# Patient Record
Sex: FEMALE | Race: WHITE | NOT HISPANIC OR LATINO | URBAN - METROPOLITAN AREA
[De-identification: names, ages, dates, MRNs, and addresses within clinical notes are randomized per-mention and may not be internally consistent; named-entity substitution may affect disease eponyms.]

---

## 2018-11-09 ENCOUNTER — EMERGENCY (EMERGENCY)
Facility: HOSPITAL | Age: 68
LOS: 0 days | Discharge: NURSING, REHAB OR LONG-TERM CARE | End: 2018-11-09
Attending: EMERGENCY MEDICINE

## 2018-11-09 VITALS
HEART RATE: 74 BPM | TEMPERATURE: 97 F | RESPIRATION RATE: 18 BRPM | SYSTOLIC BLOOD PRESSURE: 176 MMHG | DIASTOLIC BLOOD PRESSURE: 102 MMHG | OXYGEN SATURATION: 97 %

## 2018-11-09 VITALS
TEMPERATURE: 98 F | SYSTOLIC BLOOD PRESSURE: 169 MMHG | RESPIRATION RATE: 18 BRPM | HEART RATE: 78 BPM | OXYGEN SATURATION: 97 % | DIASTOLIC BLOOD PRESSURE: 87 MMHG

## 2018-11-09 DIAGNOSIS — S05.12XA CONTUSION OF EYEBALL AND ORBITAL TISSUES, LEFT EYE, INITIAL ENCOUNTER: ICD-10-CM

## 2018-11-09 DIAGNOSIS — S05.11XA CONTUSION OF EYEBALL AND ORBITAL TISSUES, RIGHT EYE, INITIAL ENCOUNTER: ICD-10-CM
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
## 2018-11-09 LAB
ANION GAP SERPL CALC-SCNC: 12 MMOL/L — SIGNIFICANT CHANGE UP (ref 7–14)
BASOPHILS # BLD AUTO: 0.03 K/UL — SIGNIFICANT CHANGE UP (ref 0–0.2)
BASOPHILS NFR BLD AUTO: 0.6 % — SIGNIFICANT CHANGE UP (ref 0–1)
BUN SERPL-MCNC: 18 MG/DL — SIGNIFICANT CHANGE UP (ref 10–20)
CALCIUM SERPL-MCNC: 9.5 MG/DL — SIGNIFICANT CHANGE UP (ref 8.5–10.1)
CHLORIDE SERPL-SCNC: 102 MMOL/L — SIGNIFICANT CHANGE UP (ref 98–110)
CO2 SERPL-SCNC: 27 MMOL/L — SIGNIFICANT CHANGE UP (ref 17–32)
CREAT SERPL-MCNC: 0.7 MG/DL — SIGNIFICANT CHANGE UP (ref 0.7–1.5)
EOSINOPHIL # BLD AUTO: 0.22 K/UL — SIGNIFICANT CHANGE UP (ref 0–0.7)
EOSINOPHIL NFR BLD AUTO: 4.2 % — SIGNIFICANT CHANGE UP (ref 0–8)
GLUCOSE SERPL-MCNC: 109 MG/DL — HIGH (ref 70–99)
HCT VFR BLD CALC: 35.3 % — LOW (ref 37–47)
HGB BLD-MCNC: 11.6 G/DL — LOW (ref 12–16)
IMM GRANULOCYTES NFR BLD AUTO: 0.2 % — SIGNIFICANT CHANGE UP (ref 0.1–0.3)
LYMPHOCYTES # BLD AUTO: 1.35 K/UL — SIGNIFICANT CHANGE UP (ref 1.2–3.4)
LYMPHOCYTES # BLD AUTO: 25.9 % — SIGNIFICANT CHANGE UP (ref 20.5–51.1)
MCHC RBC-ENTMCNC: 29.5 PG — SIGNIFICANT CHANGE UP (ref 27–31)
MCHC RBC-ENTMCNC: 32.9 G/DL — SIGNIFICANT CHANGE UP (ref 32–37)
MCV RBC AUTO: 89.8 FL — SIGNIFICANT CHANGE UP (ref 81–99)
MONOCYTES # BLD AUTO: 0.59 K/UL — SIGNIFICANT CHANGE UP (ref 0.1–0.6)
MONOCYTES NFR BLD AUTO: 11.3 % — HIGH (ref 1.7–9.3)
NEUTROPHILS # BLD AUTO: 3.01 K/UL — SIGNIFICANT CHANGE UP (ref 1.4–6.5)
NEUTROPHILS NFR BLD AUTO: 57.8 % — SIGNIFICANT CHANGE UP (ref 42.2–75.2)
NRBC # BLD: 0 /100 WBCS — SIGNIFICANT CHANGE UP (ref 0–0)
PLATELET # BLD AUTO: 188 K/UL — SIGNIFICANT CHANGE UP (ref 130–400)
POTASSIUM SERPL-MCNC: 4 MMOL/L — SIGNIFICANT CHANGE UP (ref 3.5–5)
POTASSIUM SERPL-SCNC: 4 MMOL/L — SIGNIFICANT CHANGE UP (ref 3.5–5)
RBC # BLD: 3.93 M/UL — LOW (ref 4.2–5.4)
RBC # FLD: 12.6 % — SIGNIFICANT CHANGE UP (ref 11.5–14.5)
SODIUM SERPL-SCNC: 141 MMOL/L — SIGNIFICANT CHANGE UP (ref 135–146)
WBC # BLD: 5.21 K/UL — SIGNIFICANT CHANGE UP (ref 4.8–10.8)
WBC # FLD AUTO: 5.21 K/UL — SIGNIFICANT CHANGE UP (ref 4.8–10.8)

## 2018-11-09 NOTE — ED PROVIDER NOTE - PROGRESS NOTE DETAILS
Endorsed to Dr Kirk to follow up CT scan, and dispo. I acknowledge signout from Dr. Francis I attempted to reach the [facility] without any answer and Dr. Francis attempted multiple times previously as well.

## 2018-11-09 NOTE — ED PROVIDER NOTE - CARE PROVIDER_API CALL
Celina Buckner), Internal Medicine  [address] [place] [address]  Phone: [phone]  Fax: [phone]

## 2018-11-09 NOTE — ED ADULT NURSE NOTE - CHIEF COMPLAINT QUOTE
Pt ALEJANDRO from [facility] for b/l bruising of the eyes unknown source. pt takes ASA 81mg.

## 2018-11-09 NOTE — ED PROVIDER NOTE - OBJECTIVE STATEMENT
69 yo female h/o RA, psychosis, cognitive impairment sent from assisted living facility for evaluation of periorbital ecchymosis.  unknown chronicity, patient unable to tell, facility she is from is not picking up the phone.  Patient appears well otherwise, smiling.  Given lack of info will obtain basic labs and Ct head and c-spine.

## 2018-11-09 NOTE — ED ADULT TRIAGE NOTE - CHIEF COMPLAINT QUOTE
Pt BIBA from [facility] living for b/l bruising of the eyes unknown source, Pt is nonverbal pt takes ASA 81mg. Pt ALEJANDRO from [facility] for b/l bruising of the eyes unknown source. pt takes ASA 81mg.

## 2018-11-09 NOTE — ED PROVIDER NOTE - PHYSICAL EXAMINATION
Vital Signs: I have reviewed the initial vital signs.  Constitutional: well-nourished, well-appearing, appears stated age, no acute distress, smiling  Head: no lacerations or palpable hematomas, + b/l periorbital ecchymosis, EOMI without entrapment, PERRL  ENT: mmm, no epistaxis /septal hematoma  Cardiovascular: regular rate, regular rhythm, well-perfused extremities  Respiratory: unlabored respiratory effort, clear to auscultation bilaterally  Gastrointestinal: soft, non-tender abdomen, no pulsatile mass, no CVA ttp  Musculoskeletal: supple neck without midline spine ttp,, no lower extremity edema, no focal ttp over the bones  or joints  Integumentary: warm, dry, no rash, no lacerations   Neurologic: awake, alert, smiling, good eye contact, follow simple directions, but does not answer questions, moving all extremities equally  Psychiatric: cooperative

## 2018-12-10 ENCOUNTER — OUTPATIENT (OUTPATIENT)
Dept: OUTPATIENT SERVICES | Facility: HOSPITAL | Age: 68
LOS: 1 days | Discharge: HOME | End: 2018-12-10

## 2018-12-10 DIAGNOSIS — I11.9 HYPERTENSIVE HEART DISEASE WITHOUT HEART FAILURE: ICD-10-CM

## 2018-12-31 ENCOUNTER — OUTPATIENT (OUTPATIENT)
Dept: OUTPATIENT SERVICES | Facility: HOSPITAL | Age: 68
LOS: 1 days | Discharge: HOME | End: 2018-12-31

## 2018-12-31 DIAGNOSIS — F03.90 UNSPECIFIED DEMENTIA WITHOUT BEHAVIORAL DISTURBANCE: ICD-10-CM

## 2019-01-02 ENCOUNTER — OUTPATIENT (OUTPATIENT)
Dept: OUTPATIENT SERVICES | Facility: HOSPITAL | Age: 69
LOS: 1 days | Discharge: HOME | End: 2019-01-02

## 2019-01-02 DIAGNOSIS — F03.90 UNSPECIFIED DEMENTIA WITHOUT BEHAVIORAL DISTURBANCE: ICD-10-CM

## 2019-02-20 ENCOUNTER — OUTPATIENT (OUTPATIENT)
Dept: OUTPATIENT SERVICES | Facility: HOSPITAL | Age: 69
LOS: 1 days | Discharge: HOME | End: 2019-02-20

## 2019-02-20 DIAGNOSIS — F29 UNSPECIFIED PSYCHOSIS NOT DUE TO A SUBSTANCE OR KNOWN PHYSIOLOGICAL CONDITION: ICD-10-CM

## 2019-04-04 ENCOUNTER — OUTPATIENT (OUTPATIENT)
Dept: OUTPATIENT SERVICES | Facility: HOSPITAL | Age: 69
LOS: 1 days | Discharge: HOME | End: 2019-04-04

## 2019-04-04 DIAGNOSIS — R50.9 FEVER, UNSPECIFIED: ICD-10-CM

## 2019-05-29 ENCOUNTER — OUTPATIENT (OUTPATIENT)
Dept: OUTPATIENT SERVICES | Facility: HOSPITAL | Age: 69
LOS: 1 days | Discharge: HOME | End: 2019-05-29

## 2019-05-29 DIAGNOSIS — G40.89 OTHER SEIZURES: ICD-10-CM

## 2019-05-29 DIAGNOSIS — J96.00 ACUTE RESPIRATORY FAILURE, UNSPECIFIED WHETHER WITH HYPOXIA OR HYPERCAPNIA: ICD-10-CM

## 2019-05-29 DIAGNOSIS — I11.9 HYPERTENSIVE HEART DISEASE WITHOUT HEART FAILURE: ICD-10-CM

## 2019-05-29 DIAGNOSIS — Z99.11 DEPENDENCE ON RESPIRATOR [VENTILATOR] STATUS: ICD-10-CM

## 2019-05-29 DIAGNOSIS — G30.9 ALZHEIMER'S DISEASE, UNSPECIFIED: ICD-10-CM

## 2019-05-31 ENCOUNTER — OUTPATIENT (OUTPATIENT)
Dept: OUTPATIENT SERVICES | Facility: HOSPITAL | Age: 69
LOS: 1 days | Discharge: HOME | End: 2019-05-31

## 2019-05-31 DIAGNOSIS — Z99.11 DEPENDENCE ON RESPIRATOR [VENTILATOR] STATUS: ICD-10-CM

## 2019-05-31 DIAGNOSIS — J96.00 ACUTE RESPIRATORY FAILURE, UNSPECIFIED WHETHER WITH HYPOXIA OR HYPERCAPNIA: ICD-10-CM

## 2019-06-07 ENCOUNTER — OUTPATIENT (OUTPATIENT)
Dept: OUTPATIENT SERVICES | Facility: HOSPITAL | Age: 69
LOS: 1 days | Discharge: HOME | End: 2019-06-07

## 2019-06-07 DIAGNOSIS — G40.89 OTHER SEIZURES: ICD-10-CM

## 2019-07-26 ENCOUNTER — OUTPATIENT (OUTPATIENT)
Dept: OUTPATIENT SERVICES | Facility: HOSPITAL | Age: 69
LOS: 1 days | Discharge: HOME | End: 2019-07-26

## 2019-07-26 DIAGNOSIS — F03.90 UNSPECIFIED DEMENTIA WITHOUT BEHAVIORAL DISTURBANCE: ICD-10-CM

## 2019-07-26 DIAGNOSIS — R33.0 DRUG INDUCED RETENTION OF URINE: ICD-10-CM

## 2019-07-30 ENCOUNTER — OUTPATIENT (OUTPATIENT)
Dept: OUTPATIENT SERVICES | Facility: HOSPITAL | Age: 69
LOS: 1 days | Discharge: HOME | End: 2019-07-30

## 2019-07-30 DIAGNOSIS — G71.8: ICD-10-CM

## 2019-07-30 DIAGNOSIS — F33.0 MAJOR DEPRESSIVE DISORDER, RECURRENT, MILD: ICD-10-CM

## 2019-07-30 DIAGNOSIS — G40.89 OTHER SEIZURES: ICD-10-CM

## 2019-07-30 DIAGNOSIS — F03.91 UNSPECIFIED DEMENTIA WITH BEHAVIORAL DISTURBANCE: ICD-10-CM

## 2019-08-01 ENCOUNTER — OUTPATIENT (OUTPATIENT)
Dept: OUTPATIENT SERVICES | Facility: HOSPITAL | Age: 69
LOS: 1 days | Discharge: HOME | End: 2019-08-01

## 2019-08-01 DIAGNOSIS — E72.20 DISORDER OF UREA CYCLE METABOLISM, UNSPECIFIED: ICD-10-CM

## 2019-08-01 DIAGNOSIS — D64.9 ANEMIA, UNSPECIFIED: ICD-10-CM

## 2019-08-21 ENCOUNTER — OUTPATIENT (OUTPATIENT)
Dept: OUTPATIENT SERVICES | Facility: HOSPITAL | Age: 69
LOS: 1 days | Discharge: HOME | End: 2019-08-21

## 2019-08-21 DIAGNOSIS — G40.89 OTHER SEIZURES: ICD-10-CM

## 2019-08-21 DIAGNOSIS — J96.00 ACUTE RESPIRATORY FAILURE, UNSPECIFIED WHETHER WITH HYPOXIA OR HYPERCAPNIA: ICD-10-CM

## 2019-08-21 DIAGNOSIS — I11.9 HYPERTENSIVE HEART DISEASE WITHOUT HEART FAILURE: ICD-10-CM

## 2019-08-21 DIAGNOSIS — G30.9 ALZHEIMER'S DISEASE, UNSPECIFIED: ICD-10-CM

## 2019-10-29 ENCOUNTER — OUTPATIENT (OUTPATIENT)
Dept: OUTPATIENT SERVICES | Facility: HOSPITAL | Age: 69
LOS: 1 days | Discharge: HOME | End: 2019-10-29

## 2019-10-29 DIAGNOSIS — R50.9 FEVER, UNSPECIFIED: ICD-10-CM

## 2019-11-13 ENCOUNTER — OUTPATIENT (OUTPATIENT)
Dept: OUTPATIENT SERVICES | Facility: HOSPITAL | Age: 69
LOS: 1 days | Discharge: HOME | End: 2019-11-13

## 2019-11-13 DIAGNOSIS — G40.89 OTHER SEIZURES: ICD-10-CM

## 2019-11-13 DIAGNOSIS — G30.9 ALZHEIMER'S DISEASE, UNSPECIFIED: ICD-10-CM

## 2019-11-13 DIAGNOSIS — I11.9 HYPERTENSIVE HEART DISEASE WITHOUT HEART FAILURE: ICD-10-CM

## 2019-11-13 DIAGNOSIS — Z99.11 DEPENDENCE ON RESPIRATOR [VENTILATOR] STATUS: ICD-10-CM

## 2019-11-13 DIAGNOSIS — J96.00 ACUTE RESPIRATORY FAILURE, UNSPECIFIED WHETHER WITH HYPOXIA OR HYPERCAPNIA: ICD-10-CM

## 2020-07-01 NOTE — ED PROVIDER NOTE - CONDUCTED A DETAILED DISCUSSION WITH PATIENT AND/OR GUARDIAN REGARDING, MDM
LOS 3  Not a readmission/bundle patient  Patient is a resident at [facility] [address] [place] [address]    lab results/radiology results

## 2021-03-19 ENCOUNTER — EMERGENCY (EMERGENCY)
Facility: HOSPITAL | Age: 71
LOS: 0 days | Discharge: NURSING, REHAB OR LONG-TERM CARE | End: 2021-03-19
Attending: EMERGENCY MEDICINE | Admitting: EMERGENCY MEDICINE
Payer: MEDICARE

## 2021-03-19 VITALS
DIASTOLIC BLOOD PRESSURE: 78 MMHG | TEMPERATURE: 97 F | SYSTOLIC BLOOD PRESSURE: 113 MMHG | OXYGEN SATURATION: 100 % | RESPIRATION RATE: 18 BRPM | HEART RATE: 59 BPM

## 2021-03-19 DIAGNOSIS — K94.23 GASTROSTOMY MALFUNCTION: ICD-10-CM
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
## 2021-03-19 DIAGNOSIS — F02.80 DEMENTIA IN OTHER DISEASES CLASSIFIED ELSEWHERE, UNSPECIFIED SEVERITY, WITHOUT BEHAVIORAL DISTURBANCE, PSYCHOTIC DISTURBANCE, MOOD DISTURBANCE, AND ANXIETY: ICD-10-CM

## 2021-03-19 DIAGNOSIS — I10 ESSENTIAL (PRIMARY) HYPERTENSION: ICD-10-CM

## 2021-03-19 DIAGNOSIS — G40.909 EPILEPSY, UNSPECIFIED, NOT INTRACTABLE, WITHOUT STATUS EPILEPTICUS: ICD-10-CM
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
## 2021-03-19 DIAGNOSIS — G30.9 ALZHEIMER'S DISEASE, UNSPECIFIED: ICD-10-CM

## 2021-03-19 PROCEDURE — 99284 EMERGENCY DEPT VISIT MOD MDM: CPT | Mod: 25

## 2021-03-19 PROCEDURE — 43762 RPLC GTUBE NO REVJ TRC: CPT

## 2021-03-19 PROCEDURE — 74018 RADEX ABDOMEN 1 VIEW: CPT | Mod: 26

## 2021-03-19 RX ORDER — IOHEXOL 300 MG/ML
30 INJECTION, SOLUTION INTRAVENOUS ONCE
Refills: 0 | Status: COMPLETED | OUTPATIENT
Start: 2021-03-19 | End: 2021-03-19

## 2021-03-19 RX ADMIN — IOHEXOL 30 MILLILITER(S): 300 INJECTION, SOLUTION INTRAVENOUS at 01:30

## 2021-03-19 NOTE — ED PROVIDER NOTE - PHYSICAL EXAMINATION
VITALS: Reviewed  CONSTITUTIONAL: Trached, nonverbal, in no acute distress  SKIN: warm, dry, no rash  HEAD: normocephalic, atraumatic  EYES: PERRL,  no conjunctival erythema, sclera clear  ENT: patent airway, moist mucous membranes  NECK: supple, no masses  CV:  regular rate, regular rhythm, 2+ radial pulses bilaterally  RESP: no wheezes, no rales, no rhonchi, normal work of breathing  ABD: soft, nontender, nondistended, no rebound, no guarding, Gastrostomy stoma no infectious signs.  MSK: no cyanosis, no edema  NEURO: nonverbal

## 2021-03-19 NOTE — ED PROCEDURE NOTE - ATTENDING CONTRIBUTION TO CARE
I personally evaluated the patient. I reviewed the Resident’s or Physician Assistant’s note (as assigned above), and agree with the findings and plan except as documented in my note.    G tube replacement.

## 2021-03-19 NOTE — ED PROVIDER NOTE - CLINICAL SUMMARY MEDICAL DECISION MAKING FREE TEXT BOX
Pt presented from NH for replacement of g tube. It was done and confirmed with contrast enhanced imaging. Will d/c

## 2021-03-19 NOTE — ED PROVIDER NOTE - ATTENDING CONTRIBUTION TO CARE
I personally evaluated the patient. I reviewed the Resident’s or Physician Assistant’s note (as assigned above), and agree with the findings and plan except as documented in my note.  70 F from NH with hx of alzheimer's dementia, htn, seizure d/o, trached, vented, non verbal with g tube in place presented due to displacement of the gtube. Occurred several hours prior. Pt has had the gtube in place since 2019. No other complaints. VS reviewed, pt non-toxic appearing, NAD. trached and vented. Head ncat, neck supple, normal ROM, normal s1s2 without any murmurs, Lungs CTAB, abd +BS, s/nd, + site of Gtube without the tube in place, no active bleeding, no skin erythema, extremities wnl, neuro exam grossly normal. Plan is g tube replacement, confirmation with imaging and dispo accordingly.

## 2021-03-19 NOTE — ED ADULT NURSE NOTE - OBJECTIVE STATEMENT
pt sent in from [facility] for replacement of feeding tube

## 2021-03-19 NOTE — ED ADULT NURSE NOTE - NSIMPLEMENTINTERV_GEN_ALL_ED
Implemented All Fall with Harm Risk Interventions:  [place] to call system. Call bell, personal items and telephone within reach. Instruct patient to call for assistance. Room bathroom lighting operational. Non-slip footwear when patient is off stretcher. Physically safe environment: no spills, clutter or unnecessary equipment. Stretcher in lowest position, wheels locked, appropriate side rails in place. Provide visual cue, wrist band, yellow gown, etc. Monitor gait and stability. Monitor for mental status changes and reorient to person, place, and time. Review medications for side effects contributing to fall risk. Reinforce activity limits and safety measures with patient and family. Provide visual clues: red socks.

## 2021-03-19 NOTE — ED PROVIDER NOTE - OBJECTIVE STATEMENT
70Y F with PMH of dementia, alzheimer's disease, HTN, Seizure, PEG tube since April 2019 presents BIBEMS from [facility] for G-tube dislodgement. Spoke to nursing home, no other concerns. 70Y F with PMH of dementia, alzheimer's disease, HTN, Seizure, G tube since April 2019 presents BIBEMS from [facility] for G-tube dislodgement. Spoke to nursing home, no other concerns.

## 2021-03-19 NOTE — ED PROVIDER NOTE - PATIENT PORTAL LINK FT
You can access the FollowMyHealth Patient Portal offered by [facility] by registering at the following website: [email or web address]. By joining [facility]’s FollowMyHealth portal, you will also be able to view your health information using other applications (apps) compatible with our system.

## 2021-04-15 ENCOUNTER — INPATIENT (INPATIENT)
Facility: HOSPITAL | Age: 71
LOS: 7 days | Discharge: NURSING, REHAB OR LONG-TERM CARE | End: 2021-04-23
Attending: HOSPITALIST | Admitting: HOSPITALIST
Payer: MEDICARE

## 2021-04-15 VITALS
TEMPERATURE: 98 F | OXYGEN SATURATION: 97 % | HEART RATE: 84 BPM | DIASTOLIC BLOOD PRESSURE: 54 MMHG | SYSTOLIC BLOOD PRESSURE: 124 MMHG | RESPIRATION RATE: 20 BRPM

## 2021-04-15 DIAGNOSIS — Z93.1 GASTROSTOMY STATUS: Chronic | ICD-10-CM

## 2021-04-15 DIAGNOSIS — Z93.0 TRACHEOSTOMY STATUS: Chronic | ICD-10-CM

## 2021-04-15 LAB
ABO RH CONFIRMATION: SIGNIFICANT CHANGE UP
ALBUMIN SERPL ELPH-MCNC: 3.3 G/DL — LOW (ref 3.5–5.2)
ALP SERPL-CCNC: 179 U/L — HIGH (ref 30–115)
ALT FLD-CCNC: 84 U/L — HIGH (ref 0–41)
ANION GAP SERPL CALC-SCNC: 16 MMOL/L — HIGH (ref 7–14)
APPEARANCE UR: ABNORMAL
APTT BLD: 31.9 SEC — SIGNIFICANT CHANGE UP (ref 27–39.2)
AST SERPL-CCNC: 62 U/L — HIGH (ref 0–41)
BACTERIA # UR AUTO: ABNORMAL
BASOPHILS # BLD AUTO: 0 K/UL — SIGNIFICANT CHANGE UP (ref 0–0.2)
BASOPHILS NFR BLD AUTO: 0 % — SIGNIFICANT CHANGE UP (ref 0–1)
BILIRUB SERPL-MCNC: <0.2 MG/DL — SIGNIFICANT CHANGE UP (ref 0.2–1.2)
BILIRUB UR-MCNC: NEGATIVE — SIGNIFICANT CHANGE UP
BLD GP AB SCN SERPL QL: SIGNIFICANT CHANGE UP
BUN SERPL-MCNC: 112 MG/DL — CRITICAL HIGH (ref 10–20)
CALCIUM SERPL-MCNC: 9.2 MG/DL — SIGNIFICANT CHANGE UP (ref 8.5–10.1)
CHLORIDE SERPL-SCNC: 93 MMOL/L — LOW (ref 98–110)
CO2 SERPL-SCNC: 28 MMOL/L — SIGNIFICANT CHANGE UP (ref 17–32)
COLOR SPEC: YELLOW — SIGNIFICANT CHANGE UP
CREAT SERPL-MCNC: 3.3 MG/DL — HIGH (ref 0.7–1.5)
DIFF PNL FLD: ABNORMAL
EOSINOPHIL # BLD AUTO: 0.61 K/UL — SIGNIFICANT CHANGE UP (ref 0–0.7)
EOSINOPHIL NFR BLD AUTO: 2.6 % — SIGNIFICANT CHANGE UP (ref 0–8)
EPI CELLS # UR: 0 /HPF — SIGNIFICANT CHANGE UP (ref 0–5)
GAS PNL BLDA: SIGNIFICANT CHANGE UP
GIANT PLATELETS BLD QL SMEAR: PRESENT — SIGNIFICANT CHANGE UP
GLUCOSE SERPL-MCNC: 100 MG/DL — HIGH (ref 70–99)
GLUCOSE UR QL: NEGATIVE — SIGNIFICANT CHANGE UP
HCT VFR BLD CALC: 20.8 % — LOW (ref 37–47)
HCT VFR BLD CALC: 24.6 % — LOW (ref 37–47)
HGB BLD-MCNC: 6.9 G/DL — CRITICAL LOW (ref 12–16)
HGB BLD-MCNC: 8 G/DL — LOW (ref 12–16)
HYALINE CASTS # UR AUTO: 2 /LPF — SIGNIFICANT CHANGE UP (ref 0–7)
INR BLD: 1.07 RATIO — SIGNIFICANT CHANGE UP (ref 0.65–1.3)
KETONES UR-MCNC: NEGATIVE — SIGNIFICANT CHANGE UP
LEUKOCYTE ESTERASE UR-ACNC: ABNORMAL
LYMPHOCYTES # BLD AUTO: 1.84 K/UL — SIGNIFICANT CHANGE UP (ref 1.2–3.4)
LYMPHOCYTES # BLD AUTO: 7.8 % — LOW (ref 20.5–51.1)
MAGNESIUM SERPL-MCNC: 3.8 MG/DL — CRITICAL HIGH (ref 1.8–2.4)
MANUAL SMEAR VERIFICATION: SIGNIFICANT CHANGE UP
MCHC RBC-ENTMCNC: 29.9 PG — SIGNIFICANT CHANGE UP (ref 27–31)
MCHC RBC-ENTMCNC: 30.7 PG — SIGNIFICANT CHANGE UP (ref 27–31)
MCHC RBC-ENTMCNC: 32.5 G/DL — SIGNIFICANT CHANGE UP (ref 32–37)
MCHC RBC-ENTMCNC: 33.2 G/DL — SIGNIFICANT CHANGE UP (ref 32–37)
MCV RBC AUTO: 91.8 FL — SIGNIFICANT CHANGE UP (ref 81–99)
MCV RBC AUTO: 92.4 FL — SIGNIFICANT CHANGE UP (ref 81–99)
MONOCYTES # BLD AUTO: 1.44 K/UL — HIGH (ref 0.1–0.6)
MONOCYTES NFR BLD AUTO: 6.1 % — SIGNIFICANT CHANGE UP (ref 1.7–9.3)
MYELOCYTES NFR BLD: 2.6 % — HIGH (ref 0–0)
NEUTROPHILS # BLD AUTO: 18.27 K/UL — HIGH (ref 1.4–6.5)
NEUTROPHILS NFR BLD AUTO: 76.5 % — HIGH (ref 42.2–75.2)
NEUTS BAND # BLD: 0.9 % — SIGNIFICANT CHANGE UP (ref 0–6)
NITRITE UR-MCNC: NEGATIVE — SIGNIFICANT CHANGE UP
NRBC # BLD: 0 /100 WBCS — SIGNIFICANT CHANGE UP (ref 0–0)
PH UR: 8 — SIGNIFICANT CHANGE UP (ref 5–8)
PLAT MORPH BLD: NORMAL — SIGNIFICANT CHANGE UP
PLATELET # BLD AUTO: 271 K/UL — SIGNIFICANT CHANGE UP (ref 130–400)
PLATELET # BLD AUTO: 322 K/UL — SIGNIFICANT CHANGE UP (ref 130–400)
POLYCHROMASIA BLD QL SMEAR: SLIGHT — SIGNIFICANT CHANGE UP
POTASSIUM SERPL-MCNC: 5.8 MMOL/L — HIGH (ref 3.5–5)
POTASSIUM SERPL-SCNC: 5.8 MMOL/L — HIGH (ref 3.5–5)
PROMYELOCYTES # FLD: 3.5 % — HIGH (ref 0–0)
PROT SERPL-MCNC: 6.6 G/DL — SIGNIFICANT CHANGE UP (ref 6–8)
PROT UR-MCNC: ABNORMAL
PROTHROM AB SERPL-ACNC: 12.3 SEC — SIGNIFICANT CHANGE UP (ref 9.95–12.87)
RAPID RVP RESULT: SIGNIFICANT CHANGE UP
RBC # BLD: 2.25 M/UL — LOW (ref 4.2–5.4)
RBC # BLD: 2.68 M/UL — LOW (ref 4.2–5.4)
RBC # FLD: 13.8 % — SIGNIFICANT CHANGE UP (ref 11.5–14.5)
RBC # FLD: 13.9 % — SIGNIFICANT CHANGE UP (ref 11.5–14.5)
RBC BLD AUTO: ABNORMAL
RBC CASTS # UR COMP ASSIST: 5 /HPF — HIGH (ref 0–4)
SARS-COV-2 RNA SPEC QL NAA+PROBE: SIGNIFICANT CHANGE UP
SODIUM SERPL-SCNC: 137 MMOL/L — SIGNIFICANT CHANGE UP (ref 135–146)
SP GR SPEC: 1.02 — SIGNIFICANT CHANGE UP (ref 1.01–1.03)
TROPONIN T SERPL-MCNC: <0.01 NG/ML — SIGNIFICANT CHANGE UP
UROBILINOGEN FLD QL: SIGNIFICANT CHANGE UP
WBC # BLD: 19.33 K/UL — HIGH (ref 4.8–10.8)
WBC # BLD: 23.61 K/UL — HIGH (ref 4.8–10.8)
WBC # FLD AUTO: 19.33 K/UL — HIGH (ref 4.8–10.8)
WBC # FLD AUTO: 23.61 K/UL — HIGH (ref 4.8–10.8)
WBC UR QL: 100 /HPF — HIGH (ref 0–5)

## 2021-04-15 PROCEDURE — 71045 X-RAY EXAM CHEST 1 VIEW: CPT | Mod: 26

## 2021-04-15 PROCEDURE — 99285 EMERGENCY DEPT VISIT HI MDM: CPT

## 2021-04-15 PROCEDURE — 93010 ELECTROCARDIOGRAM REPORT: CPT

## 2021-04-15 PROCEDURE — 99222 1ST HOSP IP/OBS MODERATE 55: CPT

## 2021-04-15 PROCEDURE — 49450 REPLACE G/C TUBE PERC: CPT

## 2021-04-15 PROCEDURE — 74174 CTA ABD&PLVS W/CONTRAST: CPT | Mod: 26,MH

## 2021-04-15 PROCEDURE — 99223 1ST HOSP IP/OBS HIGH 75: CPT | Mod: 25

## 2021-04-15 RX ORDER — CHLORHEXIDINE GLUCONATE 213 G/1000ML
1 SOLUTION TOPICAL
Refills: 0 | Status: DISCONTINUED | OUTPATIENT
Start: 2021-04-15 | End: 2021-04-23

## 2021-04-15 RX ORDER — SOD SULF/SODIUM/NAHCO3/KCL/PEG
4000 SOLUTION, RECONSTITUTED, ORAL ORAL ONCE
Refills: 0 | Status: COMPLETED | OUTPATIENT
Start: 2021-04-15 | End: 2021-04-15

## 2021-04-15 RX ORDER — HYDRALAZINE HCL 50 MG
25 TABLET ORAL
Refills: 0 | Status: DISCONTINUED | OUTPATIENT
Start: 2021-04-15 | End: 2021-04-23

## 2021-04-15 RX ORDER — PANTOPRAZOLE SODIUM 20 MG/1
40 TABLET, DELAYED RELEASE ORAL
Refills: 0 | Status: DISCONTINUED | OUTPATIENT
Start: 2021-04-15 | End: 2021-04-23

## 2021-04-15 RX ORDER — CEFEPIME 1 G/1
2000 INJECTION, POWDER, FOR SOLUTION INTRAMUSCULAR; INTRAVENOUS ONCE
Refills: 0 | Status: COMPLETED | OUTPATIENT
Start: 2021-04-15 | End: 2021-04-15

## 2021-04-15 RX ORDER — CEFEPIME 1 G/1
1000 INJECTION, POWDER, FOR SOLUTION INTRAMUSCULAR; INTRAVENOUS EVERY 12 HOURS
Refills: 0 | Status: DISCONTINUED | OUTPATIENT
Start: 2021-04-16 | End: 2021-04-16

## 2021-04-15 RX ORDER — CEFEPIME 1 G/1
INJECTION, POWDER, FOR SOLUTION INTRAMUSCULAR; INTRAVENOUS
Refills: 0 | Status: DISCONTINUED | OUTPATIENT
Start: 2021-04-15 | End: 2021-04-16

## 2021-04-15 RX ORDER — ACETAMINOPHEN 500 MG
650 TABLET ORAL EVERY 6 HOURS
Refills: 0 | Status: DISCONTINUED | OUTPATIENT
Start: 2021-04-15 | End: 2021-04-23

## 2021-04-15 RX ORDER — GABAPENTIN 400 MG/1
400 CAPSULE ORAL
Refills: 0 | Status: DISCONTINUED | OUTPATIENT
Start: 2021-04-15 | End: 2021-04-16

## 2021-04-15 RX ORDER — MULTIVIT-MIN/FERROUS GLUCONATE 9 MG/15 ML
1 LIQUID (ML) ORAL DAILY
Refills: 0 | Status: DISCONTINUED | OUTPATIENT
Start: 2021-04-15 | End: 2021-04-23

## 2021-04-15 RX ORDER — VANCOMYCIN HCL 1 G
1000 VIAL (EA) INTRAVENOUS ONCE
Refills: 0 | Status: COMPLETED | OUTPATIENT
Start: 2021-04-15 | End: 2021-04-15

## 2021-04-15 RX ORDER — LORATADINE 10 MG/1
10 TABLET ORAL DAILY
Refills: 0 | Status: DISCONTINUED | OUTPATIENT
Start: 2021-04-15 | End: 2021-04-23

## 2021-04-15 RX ORDER — SODIUM CHLORIDE 9 MG/ML
1000 INJECTION INTRAMUSCULAR; INTRAVENOUS; SUBCUTANEOUS
Refills: 0 | Status: DISCONTINUED | OUTPATIENT
Start: 2021-04-15 | End: 2021-04-16

## 2021-04-15 RX ORDER — CEFEPIME 1 G/1
1000 INJECTION, POWDER, FOR SOLUTION INTRAMUSCULAR; INTRAVENOUS ONCE
Refills: 0 | Status: COMPLETED | OUTPATIENT
Start: 2021-04-15 | End: 2021-04-15

## 2021-04-15 RX ORDER — SODIUM CHLORIDE 9 MG/ML
1000 INJECTION, SOLUTION INTRAVENOUS ONCE
Refills: 0 | Status: COMPLETED | OUTPATIENT
Start: 2021-04-15 | End: 2021-04-15

## 2021-04-15 RX ORDER — GABAPENTIN 400 MG/1
300 CAPSULE ORAL
Refills: 0 | Status: DISCONTINUED | OUTPATIENT
Start: 2021-04-15 | End: 2021-04-16

## 2021-04-15 RX ORDER — BETHANECHOL CHLORIDE 25 MG
10 TABLET ORAL THREE TIMES A DAY
Refills: 0 | Status: DISCONTINUED | OUTPATIENT
Start: 2021-04-15 | End: 2021-04-23

## 2021-04-15 RX ORDER — INSULIN HUMAN 100 [IU]/ML
10 INJECTION, SOLUTION SUBCUTANEOUS ONCE
Refills: 0 | Status: COMPLETED | OUTPATIENT
Start: 2021-04-15 | End: 2021-04-15

## 2021-04-15 RX ORDER — DEXTROSE 50 % IN WATER 50 %
50 SYRINGE (ML) INTRAVENOUS ONCE
Refills: 0 | Status: COMPLETED | OUTPATIENT
Start: 2021-04-15 | End: 2021-04-15

## 2021-04-15 RX ORDER — LANOLIN ALCOHOL/MO/W.PET/CERES
5 CREAM (GRAM) TOPICAL AT BEDTIME
Refills: 0 | Status: DISCONTINUED | OUTPATIENT
Start: 2021-04-15 | End: 2021-04-23

## 2021-04-15 RX ORDER — SACCHAROMYCES BOULARDII 250 MG
250 POWDER IN PACKET (EA) ORAL
Refills: 0 | Status: DISCONTINUED | OUTPATIENT
Start: 2021-04-15 | End: 2021-04-23

## 2021-04-15 RX ADMIN — INSULIN HUMAN 10 UNIT(S): 100 INJECTION, SOLUTION SUBCUTANEOUS at 17:29

## 2021-04-15 RX ADMIN — SODIUM CHLORIDE 70 MILLILITER(S): 9 INJECTION INTRAMUSCULAR; INTRAVENOUS; SUBCUTANEOUS at 18:39

## 2021-04-15 RX ADMIN — PANTOPRAZOLE SODIUM 40 MILLIGRAM(S): 20 TABLET, DELAYED RELEASE ORAL at 18:36

## 2021-04-15 RX ADMIN — Medication 5 MILLIGRAM(S): at 22:28

## 2021-04-15 RX ADMIN — Medication 10 MILLIGRAM(S): at 22:28

## 2021-04-15 RX ADMIN — Medication 50 MILLILITER(S): at 17:19

## 2021-04-15 RX ADMIN — SODIUM CHLORIDE 1000 MILLILITER(S): 9 INJECTION, SOLUTION INTRAVENOUS at 12:39

## 2021-04-15 RX ADMIN — Medication 250 MILLIGRAM(S): at 07:20

## 2021-04-15 RX ADMIN — SODIUM CHLORIDE 1000 MILLILITER(S): 9 INJECTION, SOLUTION INTRAVENOUS at 11:39

## 2021-04-15 RX ADMIN — CEFEPIME 100 MILLIGRAM(S): 1 INJECTION, POWDER, FOR SOLUTION INTRAMUSCULAR; INTRAVENOUS at 07:20

## 2021-04-15 RX ADMIN — CEFEPIME 2000 MILLIGRAM(S): 1 INJECTION, POWDER, FOR SOLUTION INTRAMUSCULAR; INTRAVENOUS at 07:50

## 2021-04-15 RX ADMIN — Medication 1000 MILLIGRAM(S): at 08:50

## 2021-04-15 NOTE — ED PROVIDER NOTE - OBJECTIVE STATEMENT
69 yo F with PMH of AD, HTN, seizure disorder, acute respiratory failure s/p trach and peg presenting with rectal bleeding and hypotension. Patient was found to have positive fecal occult blood as well as blood pressure in 70s/30s. Most recent hgb from 4/12 was 7.9. Patient is nonverbal at baseline.

## 2021-04-15 NOTE — CONSULT NOTE ADULT - ASSESSMENT
History obtained from the chart and her  Zak over the phone ( [phone])    71 y/o F with h/o AD, HTN, seizure, NPH s/p drainage, Acute resp failure due to PNA s/p PEG/trach at [facility] on 2017 sent from NH due to hypotension and hematochezia. As per chart her SBP was around 70/80s in nursing home. As per  she had enema 2-3 days ago at NH and after that bleeding started. in ER vs were stable and initial hgb:8.       # Hematochezia likely due to hemorrhoidal bleed:  - VS stable in ER without giving in fluid or blood  - hgb: 8--> baseline around 9 on 4/9  - CRISTAL showed Maroon coloured stool  - Clogged 14 Fr balloon PEG-tube noted at bedside--> tube was changed to 16 Fr. balloon PEG-tube at bedside  - lavage via PEG showed yellow liquid      Rec:  - PPI qd  - active type and screen  - Monitor CBC  - keep hgb>8  - Case discussed with  ( Mr. Zak Pa) over the phone. We offered endoscopic procedure, and fully explained risks and benefit of endoscopic procedure ( EGD/colonoscopy). Patient's  preferred to hold off any endoscopic procedure at this stage and continue with conservative management  - may obtain  Palliative care eval       History obtained from the chart and her  Zak over the phone ( [phone])    71 y/o F with h/o AD, HTN, seizure, NPH s/p drainage, Acute resp failure due to PNA s/p PEG/trach at [facility] on 2017 sent from NH due to hypotension and hematochezia. As per chart her SBP was around 70/80s in nursing home. As per  she had enema 2-3 days ago at NH and after that bleeding started. in ER vs were stable and initial hgb:8.       # Hematochezia likely due to hemorrhoidal bleed:  - VS stable initially in ER without giving in fluid or blood--> BP starts to decrease in ER   - hgb: 8--> baseline around 9 on 4/9--> repeat hgb: 6.9  - CRISTAL showed Maroon coloured stool  - Clogged 14 Fr balloon PEG-tube noted at bedside--> tube was changed to 16 Fr. balloon PEG-tube at bedside  - lavage via PEG showed yellow liquid      Rec:  - PPI BID IV  - active type and screen  - Monitor CBC  - keep hgb>8  - Case discussed with  ( Mr. Zak Pa) over the phone. We offered endoscopic procedure, and fully explained risks and benefit of endoscopic procedure ( EGD/colonoscopy). Patient's  preferred to hold off any endoscopic procedure initially.  contacted again to be informed about drop of hgb and declining of BP. He agreed to prep the patient tomorrow for possible colonoscopy/EGD tomorrow  - ER team also notified and asked to give 2 UPRBC stat and resuscitate the patient  -Please give 4 L golytely via PEG-tube   - NPO   - ICU eval

## 2021-04-15 NOTE — H&P ADULT - NSHPLABSRESULTS_GEN_ALL_CORE
6.9    19.33 )-----------( 271      ( 15 Apr 2021 11:19 )             20.8   04-15    137  |  93<L>  |  112<HH>  ----------------------------<  100<H>  5.8<H>   |  28  |  3.3<H>    Ca    9.2      15 Apr 2021 05:20  Mg     3.8     04-15    TPro  6.6  /  Alb  3.3<L>  /  TBili  <0.2  /  DBili  x   /  AST  62<H>  /  ALT  84<H>  /  AlkPhos  179<H>  04-15    < from: CT Angio Abdomen and Pelvis w/ IV Cont (04.15.21 @ 11:24) >    IMPRESSION:  1.  Nondiagnostic study for active GI bleeding as there is dense intraluminal material throughout the colon on precontrast images, may represent previously administered oral contrast, inspissated stool or possibly blood products.  2.  Patchy bibasilarpulmonary opacities, compatible with pneumonia in the appropriate clinical setting.  3.  Multiple hypoattenuating lesions in the pancreatic body measuring up to 2.7 cm, likely cystic lesions. Recommend evaluation with outpatient contrast-enhanced MRI.  4.  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 Follow-up is recommended.    < from: Xray Chest 1 View-PORTABLE IMMEDIATE (Xray Chest 1 View-PORTABLE IMMEDIATE .) (04.15.21 @ 06:54) >  Impression:  Bilateral opacifications. Support devices as described.

## 2021-04-15 NOTE — CONSULT NOTE ADULT - ASSESSMENT
IMPRESSION:  GI bleed - likely lower  History of advanced dementia  PEG/Trach    PLAN:    CNS: Avoid sedation    HEENT: Oral care    PULMONARY:  HOB @ 45 degrees. Aspiration precautions    CARDIOVASCULAR: IVF. 2u pRBC    GI: GI prophylaxis.  NPO for now. GI to take patient for EGD/colonoscopy    RENAL:  Follow up lytes.  Correct as needed    INFECTIOUS DISEASE: Follow up cultures    HEMATOLOGICAL:  DVT prophylaxis with SCDs    ENDOCRINE:  Follow up FS.  Insulin protocol if needed.    MUSCULOSKELETAL: Bedrest    SDU monitoring         IMPRESSION:    Acute blood loss/ anemia  GI bleed   History of advanced dementia  PEG/Trach  aspiration pneumonia  Renal failure    PLAN:    CNS: Avoid sedation    HEENT: Oral care    PULMONARY:  HOB @ 45 degrees. Aspiration precautions, DTA, keep Sao2 88 to 94%    CARDIOVASCULAR: IVF. 2u pRBC    GI: GI prophylaxis.  NPO for now. GI fup, protonix q 12h    RENAL:  Follow up lytes.  Correct as needed, vaughan catheter, renal us, repeat CMP    INFECTIOUS DISEASE: Follow up cultures, meropenem    HEMATOLOGICAL:  DVT prophylaxis with SCDs LE doppler    ENDOCRINE:  Follow up FS.  Insulin protocol if needed.    MUSCULOSKELETAL: Bedrest  floor

## 2021-04-15 NOTE — CONSULT NOTE ADULT - SUBJECTIVE AND OBJECTIVE BOX
Gastroenterology Consultation:    Patient is a 70y old  Female who presents with a chief complaint of low BP    Admitted on: 04-15-21  HPI:  History obtained from the chart and her  Zak over the phone ( [phone])    71 y/o F with h/o AD, HTN, seizure, NPH s/p drainage, Acute resp failure due to PNA s/p PEG/trach at [facility] on 2017 sent from NH due to hypotension and hematochezia. As per chart her SBP was around 70/80s in nursing home. As per  she had enema 2-3 days ago at NH and after that bleeding started. in ER vs were stable and initial hgb:8.     Prior records Reviewed (Y/N): Y  History obtained from person other than patient (Y/N): Y,     Prior EGD: none in record  Prior Colonoscopy: None in record      PAST MEDICAL & SURGICAL HISTORY:      FAMILY HISTORY:  non-contributory    Social History:  Tobacco: N  Alcohol: N  Drugs: N    Home Medications:    MEDICATIONS  (STANDING):    MEDICATIONS  (PRN):      Allergies  No Known Allergies      Review of Systems:   Not able to obtain          Physical Examination:  T(C): 36.8 (04-15-21 @ 07:45), Max: 36.8 (04-15-21 @ 07:45)  HR: 81 (04-15-21 @ 09:50) (79 - 90)  BP: 83/50 (04-15-21 @ 09:19) (83/50 - 124/54)  RR: 14 (04-15-21 @ 09:19) (14 - 20)  SpO2: 99% (04-15-21 @ 09:50) (97% - 100%)        Constitutional: No acute distress.  Eyes:. Conjunctivae are clear, Sclera is non-icteric.  Ears Nose and Throat: The external ears are normal appearing,  Oral mucosa is pink and moist.  Respiratory:  trach on vetilator Lung sounds bilaterally.  Cardiovascular:  S1 S2, Regular rate and rhythm.  GI: Abdomen is soft, symmetric, and non-tender without distention. 14 fr. balloon PEG-tube was noted,  Bowel sounds are present and normoactive in all four quadrants. No masses, hepatomegaly, or splenomegaly are noted. Maroon coloured stool noted on CRISTAL  Neuro: No Tremor, No involuntary movements          Data: (reviewed by attending)                        6.9    19.33 )-----------( 271      ( 15 Apr 2021 11:19 )             20.8     Hgb Trend:  6.9  04-15-21 @ 11:19  8.0  04-15-21 @ 05:20      04-15    137  |  93<L>  |  112<HH>  ----------------------------<  100<H>  5.8<H>   |  28  |  3.3<H>    Ca    9.2      15 Apr 2021 05:20  Mg     3.8     04-15    TPro  6.6  /  Alb  3.3<L>  /  TBili  <0.2  /  DBili  x   /  AST  62<H>  /  ALT  84<H>  /  AlkPhos  179<H>  04-15    Liver panel trend:  TBili <0.2   /   AST 62   /   ALT 84   /   AlkP 179   /   Tptn 6.6   /   Alb 3.3    /   DBili --      04-15      PT/INR - ( 15 Apr 2021 05:20 )   PT: 12.30 sec;   INR: 1.07 ratio         PTT - ( 15 Apr 2021 05:20 )  PTT:31.9 sec      < from: Xray Chest 1 View-PORTABLE IMMEDIATE (Xray Chest 1 View-PORTABLE IMMEDIATE .) (04.15.21 @ 06:54) >  Impression:    Bilateral opacifications. Support devices as described.    < end of copied text >    Radiology:(reviewed by attending)

## 2021-04-15 NOTE — H&P ADULT - ASSESSMENT
This is a 69 y/o F with PMHx of Alzheimer's dementia, HTN, seizure disorder, NPH s/p drainage, Acute respiratory failure secondary to pneumonia s/p PEG/trach at [facility] on 2017, recurrent UTIs sent from [facility] due to hypotension and blood in stool.    #Lower GI bleed  - GI following, CRISTAL showed maroon colored stool, acute drop in Hb, suspected hemorrhoidal bleed  - possible colonoscopy/EGD tomorrow prep tonight, per patient's  he is agreeable to colon prep but is still thinking if he should go for the colonoscopy as he is worried about the risks of the procedure. He would like her to be stabilized first and reassessed tomorrow, if she is still bleeding he would pursue EGD/colonoscopy. He would like to speak to GI again tomorrow.   - Golytely 4L via PEG tube once, NPO otherwise  - keep active type & screen, PPI IV push BID, transfuse 2U pRBC, monitor Hb, f/u CBC at 11:30pm tonight   - s/p 1L IVf bolus, will start NS @70 maintenance fluids  - keep Hb > 8    #Chronic respiratory failure s/p tracheostomy   - Vent settings per pulm  - currently stable     #Leukocytosis on admission  - monitor WBCs, WBC elevated, hypotension on admission   - could be due to a UTI vs aspiration pneumonia, unable to assess for symptoms given patient's condition  - UA positive, f/u urine culture, f/u blood cultures. CXR + CT scan shows BL opacities   - will treat empirically with cefepime for now    #Acute kidney injury, last known Cr 1.0 in march 2021 per nursing home   - Nephro evaluation  - Cr 3.3 on labs, K 5.8   - will give insulin + dextrose for now, patient received 1L IVf bolus  - will get urine studies and renal US  - f/u repeat BMP at 8    #HTN  - hydralazine 25mg 4 times a day     #Agitation  - ativan 1mg BID, 0.5mg q6 PRN for agitation     #Suspected Vitamin deficiencies  - c/w multivitamin     --- med rec done with RN at nursing home. Continuing all her meds except tizanidine 4mg, pepcid 10mg, senna & colace, aspirin, heparin (for dvt px)    #DVT Px: Holding due to LGIB  #GI Px: On PPI IV BID   #Activity: Bedrest  #Code status: DNR   #Dispo: from [facility], Step down unit for now  This is a 69 y/o F with PMHx of Alzheimer's dementia, HTN, seizure disorder, NPH s/p drainage, Acute respiratory failure secondary to pneumonia s/p PEG/trach at [facility] on 2017, recurrent UTIs sent from [facility] due to hypotension and blood in stool.    #Lower GI bleed  - GI following, CRISTAL showed maroon colored stool, acute drop in Hb, suspected hemorrhoidal bleed  - possible colonoscopy/EGD tomorrow prep tonight, per patient's  he is agreeable to colon prep but is still thinking if he should go for the colonoscopy as he is worried about the risks of the procedure. He would like her to be stabilized first and reassessed tomorrow, if she is still bleeding he would pursue EGD/colonoscopy. He would like to speak to GI again tomorrow.   - Golytely 4L via PEG tube once, NPO otherwise  - keep active type & screen, PPI IV push BID, transfuse 2U pRBC, monitor Hb, f/u CBC at 11:30pm tonight   - s/p 1L IVf bolus, will start NS @70 maintenance fluids  - keep Hb > 8    #Chronic respiratory failure s/p tracheostomy   - Vent settings per pulm  - currently stable     #Leukocytosis on admission  - monitor WBCs, WBC elevated, hypotension on admission   - could be due to a UTI vs aspiration pneumonia, unable to assess for symptoms given patient's condition  - UA positive, f/u urine culture, f/u blood cultures. CXR + CT scan shows BL opacities   - will treat empirically with cefepime for now    #Acute kidney injury, last known Cr 1.0 in march 2021 per nursing home   - Nephro evaluation  - Cr 3.3 on labs, K 5.8   - will give insulin + dextrose for now, patient received 1L IVf bolus  - will get urine studies and renal US  - f/u repeat BMP at 8    #HTN  - hydralazine 25mg 4 times a day     #Agitation  - ativan 1mg BID, 0.5mg q6 PRN for agitation     #Suspected Vitamin deficiencies  - c/w multivitamin     --- med rec done with RN at nursing home. Continuing all her meds except tizanidine 4mg, pepcid 10mg, senna & colace, aspirin, heparin (for dvt px)    #DVT Px: Holding due to LGIB  #GI Px: On PPI IV BID   #Activity: Bedrest  #Code status: DNR per    #Dispo: from [facility], Step down unit for now

## 2021-04-15 NOTE — ED PROVIDER NOTE - ATTENDING CONTRIBUTION TO CARE
I personally evaluated the patient. I reviewed the Resident’s or Physician Assistant’s note (as assigned above), and agree with the findings and plan except as documented in my note.  70 F hx of dementia, HTN, seizure d/o, anemia, trached and vented, g tube, non verbal was sent from NH for evaluation of hypotension and lower GIB. No fever, vomiting, respiratory distress. VS reviewed, pt non-toxic appearing, NAD, trached and vented. Head ncat, MMM, neck supple, normal ROM, normal s1s2 without any murmurs, Lungs CTAB with normal work of breathing. abd +BS, s/nd/nt, g tube in placed, rectal exam with dark red blood as per MLP exam, extremities wnl, neuro exam grossly normal. No acute skin rashes. Plan is IV, labs, images and meds as needed and reassess.

## 2021-04-15 NOTE — ED PROVIDER NOTE - PROGRESS NOTE DETAILS
Yelena: Pt endorsed to Dr. Wolfe. Pt's bp dropped to 80's/50's. Fluids started. Blood being actively prepared and per blood bank will be ready soon. Will continue to reassess. Patient approved for SDU. Hgb dropped to 6.9.

## 2021-04-15 NOTE — CONSULT NOTE ADULT - ATTENDING COMMENTS
patient seen and examined, agree with above, GI bleed/ alzheimer/ bed bound ro trac/ peg, renal failure, PRBC. Protonix, GI eval, all over very poor prognosis
I edited the note

## 2021-04-15 NOTE — H&P ADULT - NSHPPHYSICALEXAM_GEN_ALL_CORE
PHYSICAL EXAM:  GENERAL: trach and peg in place, eyes closed, ill appearing   HEAD:  Atraumatic, Normocephalic  NECK: trach in place  HEART: Regular rate and rhythm  RESPIRATORY: Crackles on auscultation in BL anterior lung fields   ABDOMEN: Nondistended;   NEUROLOGY: unable to assess, nonverbal at baseline

## 2021-04-15 NOTE — CONSULT NOTE ADULT - SUBJECTIVE AND OBJECTIVE BOX
Patient is a 70y old  Female who presents with a chief complaint of     HPI:     69 y/o F with h/o AD, HTN, seizure, NPH s/p drainage, Acute resp failure due to PNA s/p PEG/trach at [facility] on  sent from NH due to hypotension and hematochezia. As per chart her SBP was around 70/80s in nursing home. As per  she had enema 2-3 days ago at NH and after that bleeding started. in ER vs were stable and initial hgb:8.         Allergies    No Known Allergies    Intolerances          PHYSICAL EXAM    ICU Vital Signs Last 24 Hrs  T(C): 36.8 (15 Apr 2021 07:45), Max: 36.8 (15 Apr 2021 07:45)  T(F): 98.2 (15 Apr 2021 07:45), Max: 98.2 (15 Apr 2021 07:45)  HR: 81 (15 Apr 2021 09:50) (79 - 90)  BP: 83/50 (15 Apr 2021 09:19) (83/50 - 124/54)  RR: 14 (15 Apr 2021 09:19) (14 - 20)  SpO2: 99% (15 Apr 2021 09:50) (97% - 100%)      CONSTITUTIONAL:   Ill appearing.  NAD    ENT:   Trach  Airway patent,   Mouth with normal mucosa.   No thrush    EYES:   pupils equal  round and reactive to light.    CARDIAC:   Normal rate  Regular rhythm.    Heart sounds S1, S2.   No edema    Vascular:   normal systolic impulse  no bruits    RESPIRATORY:   No wheezing   Normal chest expansion  No use of accessory muscles    GASTROINTESTINAL:  PEG  No guarding,   + BS    GENITOURINARY  no edema    MUSCULOSKELETAL:   No clubbing, cyanosis    NEUROLOGICAL:   Does not follow simple commands.    SKIN:   Skin normal color for race,   Warm and dry  No evidence of rash.    PSYCHIATRIC:    No apparent risk to self or others.    HEME LYMPH:   No cervical  lymphadenopathy.  No inguinal lymphadenopathy        04-15-21 @ 07:01  -  04-15-21 @ 14:44  --------------------------------------------------------  IN:  Total IN: 0 mL    OUT:    Incontinent per Collection Bag (mL): 800 mL  Total OUT: 800 mL    Total NET: -800 mL      LABS:                          6.9    19.33 )-----------( 271      ( 15 Apr 2021 11:19 )             20.8                                               04-15    137  |  93<L>  |  112<HH>  ----------------------------<  100<H>  5.8<H>   |  28  |  3.3<H>    Ca    9.2      15 Apr 2021 05:20  Mg     3.8     04-15    TPro  6.6  /  Alb  3.3<L>  /  TBili  <0.2  /  DBili  x   /  AST  62<H>  /  ALT  84<H>  /  AlkPhos  179<H>  04-15      PT/INR - ( 15 Apr 2021 05:20 )   PT: 12.30 sec;   INR: 1.07 ratio         PTT - ( 15 Apr 2021 05:20 )  PTT:31.9 sec                                       Urinalysis Basic - ( 15 Apr 2021 09:44 )    Color: Yellow / Appearance: Slightly Turbid / S.016 / pH: x  Gluc: x / Ketone: Negative  / Bili: Negative / Urobili: <2 mg/dL   Blood: x / Protein: 30 mg/dL / Nitrite: Negative   Leuk Esterase: Large / RBC: 5 /HPF /  /HPF   Sq Epi: x / Non Sq Epi: 0 /HPF / Bacteria: Few    CARDIAC MARKERS ( 15 Apr 2021 05:20 )  x     / <0.01 ng/mL / x     / x     / x                                                LIVER FUNCTIONS - ( 15 Apr 2021 05:20 )  Alb: 3.3 g/dL / Pro: 6.6 g/dL / ALK PHOS: 179 U/L / ALT: 84 U/L / AST: 62 U/L / GGT: x                                              Mode: AC/ CMV (Assist Control/ Continuous Mandatory Ventilation)  RR (machine): 14  TV (machine): 400  FiO2: 45  PEEP: 5  ITime: 1  MAP: 5  PIP: 10                                      ABG - ( 15 Apr 2021 06:22 )  pH, Arterial: 7.44  pH, Blood: x     /  pCO2: 46    /  pO2: 86    / HCO3: 31    / Base Excess: 6.6   /  SaO2: 97           Patient is a 70y old  Female who presents with a chief complaint of GI bleed      71 y/o F with PMHx of Alzheimer's dementia, HTN, seizure disorder, NPH s/p drainage, Acute respiratory failure secondary to pneumonia s/p PEG/trach at [facility] on , recurrent UTIs sent from [facility] due to hypotension and blood in stool. As per chart her SBP was around 70/80s in nursing home. As per  she received enema 2-3 days ago at NH and after that the bleeding started. In ER VS were stable and initial hgb was 8. However Hb dropped soon after to 6.9. CRISTAL showed maroon colored stool.   Patient received 1 dose of cefepime and vancomycin each, 1L bolus of IVF and is currently receiving 2U pRBCs. sp CT AP, called to evaluate    PMhx as above,    PShx as above    ROS UTO    Family hx UTO    Allergies    No Known Allergies    Intolerances          PHYSICAL EXAM    ICU Vital Signs Last 24 Hrs  T(C): 36.8 (15 Apr 2021 07:45), Max: 36.8 (15 Apr 2021 07:45)  T(F): 98.2 (15 Apr 2021 07:45), Max: 98.2 (15 Apr 2021 07:45)  HR: 81 (15 Apr 2021 09:50) (79 - 90)  BP: 83/50 (15 Apr 2021 09:19) (83/50 - 124/54)  RR: 14 (15 Apr 2021 09:19) (14 - 20)  SpO2: 99% (15 Apr 2021 09:50) (97% - 100%)      CONSTITUTIONAL:   Ill appearing    ENT:   Trach    CARDIAC:   LAMONT 3/6      RESPIRATORY:   Bibasilar rhonchi    GASTROINTESTINAL:  PEG  No guarding,   + BS    GENITOURINARY  no edema    MUSCULOSKELETAL:   No clubbing, cyanosis    NEUROLOGICAL:   Does not follow simple commands.      04-15-21 @ 07:01  -  04-15-21 @ 14:44  --------------------------------------------------------  IN:  Total IN: 0 mL    OUT:    Incontinent per Collection Bag (mL): 800 mL  Total OUT: 800 mL    Total NET: -800 mL      LABS:                          6.9    19.33 )-----------( 271      ( 15 Apr 2021 11:19 )             20.8                                               04-15    137  |  93<L>  |  112<HH>  ----------------------------<  100<H>  5.8<H>   |  28  |  3.3<H>    Ca    9.2      15 Apr 2021 05:20  Mg     3.8     04-15    TPro  6.6  /  Alb  3.3<L>  /  TBili  <0.2  /  DBili  x   /  AST  62<H>  /  ALT  84<H>  /  AlkPhos  179<H>  -15      PT/INR - ( 15 Apr 2021 05:20 )   PT: 12.30 sec;   INR: 1.07 ratio         PTT - ( 15 Apr 2021 05:20 )  PTT:31.9 sec                                       Urinalysis Basic - ( 15 Apr 2021 09:44 )    Color: Yellow / Appearance: Slightly Turbid / S.016 / pH: x  Gluc: x / Ketone: Negative  / Bili: Negative / Urobili: <2 mg/dL   Blood: x / Protein: 30 mg/dL / Nitrite: Negative   Leuk Esterase: Large / RBC: 5 /HPF /  /HPF   Sq Epi: x / Non Sq Epi: 0 /HPF / Bacteria: Few    CARDIAC MARKERS ( 15 Apr 2021 05:20 )  x     / <0.01 ng/mL / x     / x     / x                                                LIVER FUNCTIONS - ( 15 Apr 2021 05:20 )  Alb: 3.3 g/dL / Pro: 6.6 g/dL / ALK PHOS: 179 U/L / ALT: 84 U/L / AST: 62 U/L / GGT: x                                              Mode: AC/ CMV (Assist Control/ Continuous Mandatory Ventilation)  RR (machine): 14  TV (machine): 400  FiO2: 45  PEEP: 5  ITime: 1  MAP: 5  PIP: 10                                      ABG - ( 15 Apr 2021 06:22 )  pH, Arterial: 7.44  pH, Blood: x     /  pCO2: 46    /  pO2: 86    / HCO3: 31    / Base Excess: 6.6   /  SaO2: 97

## 2021-04-15 NOTE — ED PROVIDER NOTE - CARE PLAN
Principal Discharge DX:	Gastrointestinal bleed  Secondary Diagnosis:	TYESHA (acute kidney injury)  Secondary Diagnosis:	Anemia

## 2021-04-15 NOTE — ED PROVIDER NOTE - PHYSICAL EXAMINATION
CONSTITUTIONAL: ill appearing woman with trach  SKIN: Warm dry, normal skin turgor  HEAD: NCAT  EYES: PERRLA, no scleral icterus, conjunctiva pink  ENT: dry mucous membranes with no erythema or exudates  CARD: RRR, no murmurs.  RESP: b/l course breath sounds  ABD: soft, non-tender, non-distended, no rebound or guarding.  : CRISTAL positive for dark stools with bright red blood  EXT: Full ROM, no bony tenderness, no pedal edema, no calf tenderness  NEURO: responds to painful stimuli, moves all extremities

## 2021-04-15 NOTE — H&P ADULT - HISTORY OF PRESENT ILLNESS
History obtained from the chart, her  Zak over the phone ( [phone]) and nursing home RN at [phone]    69 y/o F with PMHx of Alzheimer's dementia, HTN, seizure disorder, NPH s/p drainage, Acute respiratory failure secondary to pneumonia s/p PEG/trach at [facility] on 2017, recurrent UTIs sent from [facility] due to hypotension and blood in stool. As per chart her SBP was around 70/80s in nursing home. As per  she received enema 2-3 days ago at NH and after that the bleeding started. In ER VS were stable and initial hgb was 8. However Hb dropped soon after to 6.9. CRISTAL showed maroon colored stool.   Patient received 1 dose of cefepime and vancomycin each, 1L bolus of IVF and is currently receiving 2U pRBCs.

## 2021-04-15 NOTE — ED PROVIDER NOTE - CLINICAL SUMMARY MEDICAL DECISION MAKING FREE TEXT BOX
I personally evaluated the patient. I reviewed the Resident’s or Physician Assistant’s note (as assigned above), and agree with the findings and plan except as documented in my note. Patient signed out to me by Dr. Kirk pending CT, dispo. Patient evaluated for GI bleed and hypotension. Labs, ekg, CXR, CTA performed in ED. Patient had episode of hypotension while in the ED, fluids started and PRBC adminstered with improvement. +rectal exam for blood with drop in Hgb while in ED. Discussed with ICU/critical care team, approved for SDU. Admitted to SDU for further evaluation and treatment.

## 2021-04-16 LAB
ALBUMIN SERPL ELPH-MCNC: 2.8 G/DL — LOW (ref 3.5–5.2)
ALBUMIN SERPL ELPH-MCNC: 2.9 G/DL — LOW (ref 3.5–5.2)
ALP SERPL-CCNC: 166 U/L — HIGH (ref 30–115)
ALP SERPL-CCNC: 167 U/L — HIGH (ref 30–115)
ALT FLD-CCNC: 64 U/L — HIGH (ref 0–41)
ALT FLD-CCNC: 68 U/L — HIGH (ref 0–41)
ANION GAP SERPL CALC-SCNC: 12 MMOL/L — SIGNIFICANT CHANGE UP (ref 7–14)
ANION GAP SERPL CALC-SCNC: 13 MMOL/L — SIGNIFICANT CHANGE UP (ref 7–14)
AST SERPL-CCNC: 38 U/L — SIGNIFICANT CHANGE UP (ref 0–41)
AST SERPL-CCNC: 42 U/L — HIGH (ref 0–41)
BASOPHILS # BLD AUTO: 0.14 K/UL — SIGNIFICANT CHANGE UP (ref 0–0.2)
BASOPHILS # BLD AUTO: 0.14 K/UL — SIGNIFICANT CHANGE UP (ref 0–0.2)
BASOPHILS NFR BLD AUTO: 0.7 % — SIGNIFICANT CHANGE UP (ref 0–1)
BASOPHILS NFR BLD AUTO: 0.9 % — SIGNIFICANT CHANGE UP (ref 0–1)
BILIRUB SERPL-MCNC: 0.2 MG/DL — SIGNIFICANT CHANGE UP (ref 0.2–1.2)
BILIRUB SERPL-MCNC: <0.2 MG/DL — SIGNIFICANT CHANGE UP (ref 0.2–1.2)
BUN SERPL-MCNC: 75 MG/DL — CRITICAL HIGH (ref 10–20)
BUN SERPL-MCNC: 85 MG/DL — CRITICAL HIGH (ref 10–20)
CALCIUM SERPL-MCNC: 8.9 MG/DL — SIGNIFICANT CHANGE UP (ref 8.5–10.1)
CALCIUM SERPL-MCNC: 9.1 MG/DL — SIGNIFICANT CHANGE UP (ref 8.5–10.1)
CHLORIDE SERPL-SCNC: 103 MMOL/L — SIGNIFICANT CHANGE UP (ref 98–110)
CHLORIDE SERPL-SCNC: 99 MMOL/L — SIGNIFICANT CHANGE UP (ref 98–110)
CO2 SERPL-SCNC: 25 MMOL/L — SIGNIFICANT CHANGE UP (ref 17–32)
CO2 SERPL-SCNC: 25 MMOL/L — SIGNIFICANT CHANGE UP (ref 17–32)
CREAT SERPL-MCNC: 2.1 MG/DL — HIGH (ref 0.7–1.5)
CREAT SERPL-MCNC: 2.5 MG/DL — HIGH (ref 0.7–1.5)
EOSINOPHIL # BLD AUTO: 0.32 K/UL — SIGNIFICANT CHANGE UP (ref 0–0.7)
EOSINOPHIL # BLD AUTO: 0.39 K/UL — SIGNIFICANT CHANGE UP (ref 0–0.7)
EOSINOPHIL NFR BLD AUTO: 2 % — SIGNIFICANT CHANGE UP (ref 0–8)
EOSINOPHIL NFR BLD AUTO: 2.1 % — SIGNIFICANT CHANGE UP (ref 0–8)
GLUCOSE SERPL-MCNC: 122 MG/DL — HIGH (ref 70–99)
GLUCOSE SERPL-MCNC: 122 MG/DL — HIGH (ref 70–99)
HCT VFR BLD CALC: 28.8 % — LOW (ref 37–47)
HCT VFR BLD CALC: 30.1 % — LOW (ref 37–47)
HCT VFR BLD CALC: 30.7 % — LOW (ref 37–47)
HCV AB S/CO SERPL IA: 0.04 COI — SIGNIFICANT CHANGE UP
HCV AB SERPL-IMP: SIGNIFICANT CHANGE UP
HGB BLD-MCNC: 9.5 G/DL — LOW (ref 12–16)
HGB BLD-MCNC: 9.8 G/DL — LOW (ref 12–16)
HGB BLD-MCNC: 9.9 G/DL — LOW (ref 12–16)
IMM GRANULOCYTES NFR BLD AUTO: 3.5 % — HIGH (ref 0.1–0.3)
IMM GRANULOCYTES NFR BLD AUTO: 3.8 % — HIGH (ref 0.1–0.3)
LYMPHOCYTES # BLD AUTO: 0.99 K/UL — LOW (ref 1.2–3.4)
LYMPHOCYTES # BLD AUTO: 1.01 K/UL — LOW (ref 1.2–3.4)
LYMPHOCYTES # BLD AUTO: 5.4 % — LOW (ref 20.5–51.1)
LYMPHOCYTES # BLD AUTO: 6.2 % — LOW (ref 20.5–51.1)
MAGNESIUM SERPL-MCNC: 3 MG/DL — HIGH (ref 1.8–2.4)
MCHC RBC-ENTMCNC: 29.3 PG — SIGNIFICANT CHANGE UP (ref 27–31)
MCHC RBC-ENTMCNC: 29.3 PG — SIGNIFICANT CHANGE UP (ref 27–31)
MCHC RBC-ENTMCNC: 29.7 PG — SIGNIFICANT CHANGE UP (ref 27–31)
MCHC RBC-ENTMCNC: 32.2 G/DL — SIGNIFICANT CHANGE UP (ref 32–37)
MCHC RBC-ENTMCNC: 32.6 G/DL — SIGNIFICANT CHANGE UP (ref 32–37)
MCHC RBC-ENTMCNC: 33 G/DL — SIGNIFICANT CHANGE UP (ref 32–37)
MCV RBC AUTO: 90 FL — SIGNIFICANT CHANGE UP (ref 81–99)
MCV RBC AUTO: 90.1 FL — SIGNIFICANT CHANGE UP (ref 81–99)
MCV RBC AUTO: 90.8 FL — SIGNIFICANT CHANGE UP (ref 81–99)
MONOCYTES # BLD AUTO: 1.69 K/UL — HIGH (ref 0.1–0.6)
MONOCYTES # BLD AUTO: 1.91 K/UL — HIGH (ref 0.1–0.6)
MONOCYTES NFR BLD AUTO: 10.2 % — HIGH (ref 1.7–9.3)
MONOCYTES NFR BLD AUTO: 10.6 % — HIGH (ref 1.7–9.3)
NEUTROPHILS # BLD AUTO: 12.21 K/UL — HIGH (ref 1.4–6.5)
NEUTROPHILS # BLD AUTO: 14.7 K/UL — HIGH (ref 1.4–6.5)
NEUTROPHILS NFR BLD AUTO: 76.5 % — HIGH (ref 42.2–75.2)
NEUTROPHILS NFR BLD AUTO: 78.1 % — HIGH (ref 42.2–75.2)
NRBC # BLD: 0 /100 WBCS — SIGNIFICANT CHANGE UP (ref 0–0)
PLATELET # BLD AUTO: 253 K/UL — SIGNIFICANT CHANGE UP (ref 130–400)
PLATELET # BLD AUTO: 266 K/UL — SIGNIFICANT CHANGE UP (ref 130–400)
PLATELET # BLD AUTO: 283 K/UL — SIGNIFICANT CHANGE UP (ref 130–400)
POTASSIUM SERPL-MCNC: 4.8 MMOL/L — SIGNIFICANT CHANGE UP (ref 3.5–5)
POTASSIUM SERPL-MCNC: 4.9 MMOL/L — SIGNIFICANT CHANGE UP (ref 3.5–5)
POTASSIUM SERPL-SCNC: 4.8 MMOL/L — SIGNIFICANT CHANGE UP (ref 3.5–5)
POTASSIUM SERPL-SCNC: 4.9 MMOL/L — SIGNIFICANT CHANGE UP (ref 3.5–5)
PROT SERPL-MCNC: 6.1 G/DL — SIGNIFICANT CHANGE UP (ref 6–8)
PROT SERPL-MCNC: 6.3 G/DL — SIGNIFICANT CHANGE UP (ref 6–8)
RBC # BLD: 3.2 M/UL — LOW (ref 4.2–5.4)
RBC # BLD: 3.34 M/UL — LOW (ref 4.2–5.4)
RBC # BLD: 3.38 M/UL — LOW (ref 4.2–5.4)
RBC # FLD: 13.9 % — SIGNIFICANT CHANGE UP (ref 11.5–14.5)
RBC # FLD: 14.1 % — SIGNIFICANT CHANGE UP (ref 11.5–14.5)
RBC # FLD: 14.2 % — SIGNIFICANT CHANGE UP (ref 11.5–14.5)
SODIUM SERPL-SCNC: 137 MMOL/L — SIGNIFICANT CHANGE UP (ref 135–146)
SODIUM SERPL-SCNC: 140 MMOL/L — SIGNIFICANT CHANGE UP (ref 135–146)
WBC # BLD: 14.66 K/UL — HIGH (ref 4.8–10.8)
WBC # BLD: 15.96 K/UL — HIGH (ref 4.8–10.8)
WBC # BLD: 18.81 K/UL — HIGH (ref 4.8–10.8)
WBC # FLD AUTO: 14.66 K/UL — HIGH (ref 4.8–10.8)
WBC # FLD AUTO: 15.96 K/UL — HIGH (ref 4.8–10.8)
WBC # FLD AUTO: 18.81 K/UL — HIGH (ref 4.8–10.8)

## 2021-04-16 PROCEDURE — 99233 SBSQ HOSP IP/OBS HIGH 50: CPT

## 2021-04-16 PROCEDURE — 99232 SBSQ HOSP IP/OBS MODERATE 35: CPT | Mod: GC

## 2021-04-16 PROCEDURE — 99232 SBSQ HOSP IP/OBS MODERATE 35: CPT

## 2021-04-16 RX ORDER — MEROPENEM 1 G/30ML
1000 INJECTION INTRAVENOUS EVERY 8 HOURS
Refills: 0 | Status: DISCONTINUED | OUTPATIENT
Start: 2021-04-16 | End: 2021-04-18

## 2021-04-16 RX ADMIN — CEFEPIME 100 MILLIGRAM(S): 1 INJECTION, POWDER, FOR SOLUTION INTRAMUSCULAR; INTRAVENOUS at 06:25

## 2021-04-16 RX ADMIN — MEROPENEM 100 MILLIGRAM(S): 1 INJECTION INTRAVENOUS at 11:57

## 2021-04-16 RX ADMIN — Medication 250 MILLIGRAM(S): at 17:40

## 2021-04-16 RX ADMIN — Medication 25 MILLIGRAM(S): at 17:40

## 2021-04-16 RX ADMIN — Medication 250 MILLIGRAM(S): at 06:25

## 2021-04-16 RX ADMIN — Medication 25 MILLIGRAM(S): at 11:44

## 2021-04-16 RX ADMIN — PANTOPRAZOLE SODIUM 40 MILLIGRAM(S): 20 TABLET, DELAYED RELEASE ORAL at 06:25

## 2021-04-16 RX ADMIN — Medication 1 TABLET(S): at 11:55

## 2021-04-16 RX ADMIN — Medication 10 MILLIGRAM(S): at 06:25

## 2021-04-16 RX ADMIN — GABAPENTIN 300 MILLIGRAM(S): 400 CAPSULE ORAL at 02:30

## 2021-04-16 RX ADMIN — Medication 1 TABLET(S): at 02:31

## 2021-04-16 RX ADMIN — SODIUM CHLORIDE 70 MILLILITER(S): 9 INJECTION INTRAMUSCULAR; INTRAVENOUS; SUBCUTANEOUS at 06:25

## 2021-04-16 RX ADMIN — Medication 1 MILLIGRAM(S): at 17:42

## 2021-04-16 RX ADMIN — Medication 5 MILLIGRAM(S): at 21:08

## 2021-04-16 RX ADMIN — PANTOPRAZOLE SODIUM 40 MILLIGRAM(S): 20 TABLET, DELAYED RELEASE ORAL at 17:40

## 2021-04-16 RX ADMIN — Medication 10 MILLIGRAM(S): at 11:57

## 2021-04-16 RX ADMIN — Medication 1 MILLIGRAM(S): at 06:25

## 2021-04-16 RX ADMIN — LORATADINE 10 MILLIGRAM(S): 10 TABLET ORAL at 02:31

## 2021-04-16 RX ADMIN — Medication 10 MILLIGRAM(S): at 21:43

## 2021-04-16 RX ADMIN — MEROPENEM 100 MILLIGRAM(S): 1 INJECTION INTRAVENOUS at 21:08

## 2021-04-16 RX ADMIN — LORATADINE 10 MILLIGRAM(S): 10 TABLET ORAL at 11:55

## 2021-04-16 NOTE — PROGRESS NOTE ADULT - ASSESSMENT
71 y/o F with PMHx of Alzheimer's dementia, HTN, seizure disorder, NPH s/p drainage, Acute respiratory failure secondary to pneumonia s/p PEG/trach at [facility] on 2017, recurrent UTIs sent from [facility] due to hypotension and blood in stool after an enema    #Hematochezia likely hemarrhoidal vs trauma due to enema  #Acute normocytic anemia s/p 2upRBC  - Hb on admission 6.9>>improved to 9.5 post transfusion  - GI consulted, initially planned to do EGD/Colon but as Hb appropriately improved and PEG lavage is not bloody, CRISTAL negative can be done as outpt   - will start feeds    #Suspected LLL pneumonia on CT scan with hypostension , leucocytosis  #Chronic respiratory failure s/p tracheostomy , PEG  - Vent settings per pulm  - currently stable   - was on cefepime >>changed to radha as per pulm    #UTI  - UA positive on admission  - fu ucx, Bcx  - c/w radha    #Acute kidney injury, last known Cr 1.0 in march 2021 per nursing home   - Creatinine Trend: 2.1<--, 2.5<--, 3.3<--  - Nephro evaluation  - CT abd: no hydro  - c/w Valera    #HTN  - hydralazine 25mg 4 times a day     #Agitation  - ativan 1mg BID, 0.5mg q6 PRN for agitation     #Suspected Vitamin deficiencies  - c/w multivitamin      Continuing all her meds except tizanidine 4mg, pepcid 10mg, senna & colace, aspirin,     #DVT Px: scd for now  #GI Px: On PPI IV BID   #Activity: Bedrest  #Code status: DNR   #Dispo: from [facility], Downgrade to floor

## 2021-04-16 NOTE — CONSULT NOTE ADULT - ASSESSMENT
This is a 71 y/o F with PMHx of Alzheimer's dementia, HTN, seizure disorder, NPH s/p drainage, Acute respiratory failure secondary to pneumonia s/p PEG/trach at [facility] on 2017, recurrent UTIs sent from [facility] due to hypotension and blood in stool.    #Lower GI bleed -- possible colonoscopy/EGD today  #TYESHA - prerenal, improving with IVF, good urine OP  - CT Abd - no hydro, nonobstructive renal calculi present  - cont NS 75 cc/hr  -check phos  - creat 0.8 in 2018  -reduce neurontin to 300 mg qd

## 2021-04-16 NOTE — PROGRESS NOTE ADULT - SUBJECTIVE AND OBJECTIVE BOX
ADWN SALOMON 70y Female  MRN#: [id number]   CODE STATUS: FULL  Do Not Resuscitate      SUBJECTIVE  Patient is a 70y old Female who presents with a chief complaint of Blood in stool (2021 08:53)    Currently admitted to medicine with the primary diagnosis of     Today is hospital day 1d,   INTERVAL HPI/OVERNIGHT EVENTS:    This morning she is laying in bed comfortably .   Denies chest pain, shortness of breath, abdominal pain, nausea, vomiting or changes in bowel habits.   has no complaints today.     Urinating and stooling appropriately.    Present Today:           Valera Catheter (x)No/ ()Yes? Indwelling Urethral Catheter  Indwelling Urethral Catheter    Indication:             Central Line (x)No/ ()Yes?   Indication:          IV Fluids (x)No/ ()Yes? Type:  Rate:  Indication:    OBJECTIVE  PAST MEDICAL & SURGICAL HISTORY  Alzheimer disease    HTN (hypertension)    Tracheostomy present    PEG (percutaneous endoscopic gastrostomy) status      ALLERGIES:  No Known Allergies    HOME MEDICATIONS:  Home Medications:  Aspirin Enteric Coated 81 mg oral delayed release tablet: 1 tab(s) orally once a day (15 Apr 2021 16:19)  Ativan 0.5 mg oral tablet: 1 tab(s) orally every 6 hours, As Needed for agitation (15 Apr 2021 16:22)  Ativan 1 mg oral tablet: orally 2 times a day (15 Apr 2021 16:23)  bethanechol 10 mg oral tablet: 1 tab(s) orally 3 times a day (15 Apr 2021 16:24)  Colace 100 mg oral capsule: 1 cap(s) orally 2 times a day (15 Apr 2021 16:24)  Florastor 250 mg oral capsule: 1 cap(s) orally 2 times a day (15 Apr 2021 16:20)  gabapentin 300 mg oral capsule: 1 cap(s) orally once a day (15 Apr 2021 16:18)  gabapentin 400 mg oral capsule: 1 cap(s) orally once a day (15 Apr 2021 16:18)  hydrALAZINE 25 mg oral tablet: 1 tab(s) orally 4 times a day (15 Apr 2021 16:23)  loratadine 10 mg oral tablet: 1 tab(s) orally once a day (15 Apr 2021 16:20)  Melatonin 5 mg oral tablet: 1 tab(s) orally once a day (at bedtime) (15 Apr 2021 16:23)  Multiple Vitamins with Minerals oral tablet: 1 tab(s) orally once a day (15 Apr 2021 16:19)  Pepcid 20 mg oral tablet: 0.5 tab(s) orally once a day (15 Apr 2021 16:22)  Senna 8.6 mg oral tablet: 1 tab(s) orally once a day (at bedtime) (15 Apr 2021 16:24)  tiZANidine 4 mg oral capsule: 1 cap(s) orally 2 times a day (15 Apr 2021 16:21)  Tylenol 325 mg oral capsule: 1 cap(s) orally 3 times a day (15 Apr 2021 16:20)    MEDICATIONS:  STANDING MEDICATIONS  bethanechol 10 milliGRAM(s) Oral three times a day  chlorhexidine 4% Liquid 1 Application(s) Topical <User Schedule>  gabapentin 300 milliGRAM(s) Oral <User Schedule>  gabapentin 400 milliGRAM(s) Oral <User Schedule>  hydrALAZINE 25 milliGRAM(s) Oral four times a day  loratadine 10 milliGRAM(s) Oral daily  LORazepam     Tablet 1 milliGRAM(s) Oral two times a day  melatonin 5 milliGRAM(s) Oral at bedtime  meropenem  IVPB 1000 milliGRAM(s) IV Intermittent every 8 hours  multivitamin/minerals 1 Tablet(s) Oral daily  pantoprazole  Injectable 40 milliGRAM(s) IV Push two times a day  saccharomyces boulardii 250 milliGRAM(s) Oral two times a day  sodium chloride 0.9%. 1000 milliLiter(s) (70 mL/Hr) IV Continuous <Continuous>    PRN MEDICATIONS  acetaminophen   Tablet .. 650 milliGRAM(s) Oral every 6 hours PRN  LORazepam     Tablet 0.5 milliGRAM(s) Oral every 6 hours PRN      VITAL SIGNS: Last 24 Hours  T(C): 36.4 (2021 07:20), Max: 36.7 (15 Apr 2021 20:15)  T(F): 97.6 (2021 07:20), Max: 98.1 (15 Apr 2021 20:15)  HR: 99 (2021 09:34) (82 - 99)  BP: 116/64 (2021 07:20) (108/60 - 144/65)  BP(mean): --  RR: 22 (2021 07:20) (14 - 22)  SpO2: 98% (2021 09:34) (95% - 100%)    LABS:                        9.5    15.96 )-----------( 283      ( 2021 05:28 )             28.8     04-16    140  |  103  |  75<HH>  ----------------------------<  122<H>  4.8   |  25  |  2.1<H>    Ca    8.9      2021 05:28  Mg     3.0         TPro  6.1  /  Alb  2.9<L>  /  TBili  <0.2  /  DBili  x   /  AST  38  /  ALT  64<H>  /  AlkPhos  167<H>      PT/INR - ( 15 Apr 2021 05:20 )   PT: 12.30 sec;   INR: 1.07 ratio         PTT - ( 15 Apr 2021 05:20 )  PTT:31.9 sec  Urinalysis Basic - ( 15 Apr 2021 09:44 )    Color: Yellow / Appearance: Slightly Turbid / S.016 / pH: x  Gluc: x / Ketone: Negative  / Bili: Negative / Urobili: <2 mg/dL   Blood: x / Protein: 30 mg/dL / Nitrite: Negative   Leuk Esterase: Large / RBC: 5 /HPF /  /HPF   Sq Epi: x / Non Sq Epi: 0 /HPF / Bacteria: Few      ABG - ( 15 Apr 2021 06:22 )  pH, Arterial: 7.44  pH, Blood: x     /  pCO2: 46    /  pO2: 86    / HCO3: 31    / Base Excess: 6.6   /  SaO2: 97                  CARDIAC MARKERS ( 15 Apr 2021 05:20 )  x     / <0.01 ng/mL / x     / x     / x          RADIOLOGY:    CT Angio Abdomen and Pelvis w/ IV Cont (04.15.21 @ 11:24) >  images, may represent previously administered oral contrast, inspissated stool or possibly blood products.  2.  Patchy bibasilarpulmonary opacities, compatible with pneumonia in the appropriate clinical setting.  3.  Multiple hypoattenuating lesions in the pancreatic body measuring up to 2.7 cm, likely cystic lesions. Recommend evaluation with outpatient contrast-enhanced MRI.  4.  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 Follow-up is recommended.    Xray Chest 1 View-PORTABLE IMMEDIATE (Xray Chest 1 View-PORTABLE IMMEDIATE .) (04.15.21 @ 06:54) >  Bilateral opacifications. Support devices as described.      PHYSICAL EXAM:    GENERAL: ill appearing  HEENT:  Atraumatic, Normocephalic. EOMI, PERRLA, conjunctiva and sclera clear, No JVD  PULMONARY: trach + Clear to auscultation bilaterally; No wheeze  CARDIOVASCULAR: Regular rate and rhythm; No murmurs, rubs, or gallops  GASTROINTESTINAL: PEG+  Soft, Nontender, Nondistended; Bowel sounds present  MUSCULOSKELETAL:  2+ Peripheral Pulses, No clubbing, cyanosis, or edema  NEUROLOGY: non-focal  SKIN: No rashes or lesions

## 2021-04-16 NOTE — PROGRESS NOTE ADULT - SUBJECTIVE AND OBJECTIVE BOX
Over Night Events: events noted, hb reviewed, GI reviewed    PHYSICAL EXAM    ICU Vital Signs Last 24 Hrs  T(C): 36.7 (15 Apr 2021 22:40), Max: 36.8 (15 Apr 2021 07:45)  T(F): 98 (15 Apr 2021 22:40), Max: 98.2 (15 Apr 2021 07:45)  HR: 90 (2021 02:34) (79 - 98)  BP: 111/65 (2021 02:34) (83/50 - 144/65)  RR: 20 (2021 02:34) (14 - 22)  SpO2: 99% (2021 02:34) (95% - 100%)      General: ill looking  HEENT: SIMONE. trach           Lungs: Bilateral rhonchi  Cardiovascular: LAMONT 36  Abdomen: Soft, Positive BS  Extremities: No clubbing   Skin ULCER  Neurological: unresponsive      04-15-21 @ 07:01  -  21 @ 06:14  --------------------------------------------------------  IN:  Total IN: 0 mL    OUT:    Incontinent per Collection Bag (mL): 800 mL  Total OUT: 800 mL    Total NET: -800 mL          LABS:                          9.9    18.81 )-----------( 253      ( 2021 00:18 )             30.7                                               04-16    137  |  99  |  85<HH>  ----------------------------<  122<H>  4.9   |  25  |  2.5<H>    Ca    9.1      2021 00:18  Mg     3.8     04-15    TPro  6.3  /  Alb  2.8<L>  /  TBili  0.2  /  DBili  x   /  AST  42<H>  /  ALT  68<H>  /  AlkPhos  166<H>  04-16      PT/INR - ( 15 Apr 2021 05:20 )   PT: 12.30 sec;   INR: 1.07 ratio         PTT - ( 15 Apr 2021 05:20 )  PTT:31.9 sec                                       Urinalysis Basic - ( 15 Apr 2021 09:44 )    Color: Yellow / Appearance: Slightly Turbid / S.016 / pH: x  Gluc: x / Ketone: Negative  / Bili: Negative / Urobili: <2 mg/dL   Blood: x / Protein: 30 mg/dL / Nitrite: Negative   Leuk Esterase: Large / RBC: 5 /HPF /  /HPF   Sq Epi: x / Non Sq Epi: 0 /HPF / Bacteria: Few        CARDIAC MARKERS ( 15 Apr 2021 05:20 )  x     / <0.01 ng/mL / x     / x     / x                                                LIVER FUNCTIONS - ( 2021 00:18 )  Alb: 2.8 g/dL / Pro: 6.3 g/dL / ALK PHOS: 166 U/L / ALT: 68 U/L / AST: 42 U/L / GGT: x                                                                                               Mode: AC/ CMV (Assist Control/ Continuous Mandatory Ventilation)  RR (machine): 14  TV (machine): 400  FiO2: 45  PEEP: 5  ITime: 1  MAP: 10  PIP: 20                                      ABG - ( 15 Apr 2021 06:22 )  pH, Arterial: 7.44  pH, Blood: x     /  pCO2: 46    /  pO2: 86    / HCO3: 31    / Base Excess: 6.6   /  SaO2: 97                  MEDICATIONS  (STANDING):  bethanechol 10 milliGRAM(s) Oral three times a day  cefepime   IVPB      cefepime   IVPB 1000 milliGRAM(s) IV Intermittent every 12 hours  chlorhexidine 4% Liquid 1 Application(s) Topical <User Schedule>  gabapentin 300 milliGRAM(s) Oral <User Schedule>  gabapentin 400 milliGRAM(s) Oral <User Schedule>  hydrALAZINE 25 milliGRAM(s) Oral four times a day  loratadine 10 milliGRAM(s) Oral daily  LORazepam     Tablet 1 milliGRAM(s) Oral two times a day  melatonin 5 milliGRAM(s) Oral at bedtime  multivitamin/minerals 1 Tablet(s) Oral daily  pantoprazole  Injectable 40 milliGRAM(s) IV Push two times a day  saccharomyces boulardii 250 milliGRAM(s) Oral two times a day  sodium chloride 0.9%. 1000 milliLiter(s) (70 mL/Hr) IV Continuous <Continuous>    MEDICATIONS  (PRN):  acetaminophen   Tablet .. 650 milliGRAM(s) Oral every 6 hours PRN Temp greater or equal to 38C (100.4F), Mild Pain (1 - 3)  LORazepam     Tablet 0.5 milliGRAM(s) Oral every 6 hours PRN Agitation      Xrays:    reviewed

## 2021-04-16 NOTE — PROGRESS NOTE ADULT - ASSESSMENT
History obtained from the chart and her  Zak over the phone ( [phone])    71 y/o F with h/o AD, HTN, seizure, NPH s/p drainage, Acute resp failure due to PNA s/p PEG/trach at [facility] on 2017 sent from NH due to hypotension and hematochezia. As per chart her SBP was around 70/80s in nursing home. As per  she had enema 2-3 days ago at NH and after that bleeding started. in ER vs were stable and initial hgb:8.       # Hematochezia likely due to hemorrhoidal bleed:  - VS stable initially in ER without giving in fluid or blood--> BP starts to decrease in ER   - hgb: 8--> baseline around 9 on 4/9--> repeat hgb: 6.9 s/p 2UPRBC--> hgb: 9.5  - CRISTAL today showed green stool  - Clogged 14 Fr balloon PEG-tube noted at bedside--> tube was changed to 16 Fr. balloon PEG-tube at bedside  - lavage via PEG showed yellow liquid yesterday      Rec:  - PPI BID   - active type and screen  - Monitor CBC  - keep hgb>8  - will hold off procedure at this stage because no signs of gross GI bleed and hgb increased appropriately after PRBC infusion. Risks outweigh the benefit History obtained from the chart and her  Zak over the phone ( [phone])    69 y/o F with h/o AD, HTN, seizure, NPH s/p drainage, Acute resp failure due to PNA s/p PEG/trach at [facility] on 2017 sent from NH due to hypotension and hematochezia. As per chart her SBP was around 70/80s in nursing home. As per  she had enema 2-3 days ago at NH and after that bleeding started. in ER vs were stable and initial hgb:8.       # Hematochezia likely due to hemorrhoidal bleed:  - VS stable initially in ER without giving in fluid or blood--> BP starts to decrease in ER   - hgb: 8--> baseline around 9 on 4/9--> repeat hgb: 6.9 s/p 2UPRBC--> hgb: 9.5  - CRISTAL today showed green stool  - Clogged 14 Fr balloon PEG-tube noted at bedside--> tube was changed to 16 Fr. balloon PEG-tube at bedside  - lavage via PEG showed yellow liquid yesterday      Rec:  - PPI BID   - active type and screen  - Monitor CBC  - keep hgb>8  - will hold off procedure at this stage because no signs of gross GI bleed and hgb increased appropriately after PRBC infusion. Risks outweigh the benefit  _-please notify GI if any signs of active GI bleed with drop of hgb    -call as needed, [phone] during weekdays till 5pm and call GI service after 5pm and on weekends [phone]  -Follow up with our [facility] located at [address] [place] [address]. Phone Number: [phone]   History obtained from the chart and her  Zak over the phone ( [phone])    69 y/o F with h/o AD, HTN, seizure, NPH s/p drainage, Acute resp failure due to PNA s/p PEG/trach at [facility] on 2017 sent from NH due to hypotension and hematochezia. As per chart her SBP was around 70/80s in nursing home. As per  she had enema 2-3 days ago at NH and after that bleeding started. in ER vs were stable and initial hgb:8.       # Hematochezia likely due to hemorrhoidal bleed:  - VS stable initially in ER without giving in fluid or blood--> BP starts to decrease in ER   - hgb: 8--> baseline around 9 on 4/9--> repeat hgb: 6.9 s/p 2UPRBC--> hgb: 9.5  - CRISTAL today showed green stool  - Clogged 14 Fr balloon PEG-tube noted at bedside--> tube was changed to 16 Fr. balloon PEG-tube at bedside  - lavage via PEG showed yellow liquid yesterday      Rec:  - PPI BID   - active type and screen  - Monitor CBC  - keep hgb>8  - will hold off procedure at this stage because no signs of gross GI bleed and hgb increased appropriately after PRBC infusion. Risks outweigh the benefit  - please notify GI if any signs of active GI bleed with drop of hgb  -  notified over the phone about the plan     -call as needed, [phone] during weekdays till 5pm and call GI service after 5pm and on weekends [phone]  -Follow up with our [facility] located at [address] [place] [address]. Phone Number: [phone]

## 2021-04-16 NOTE — CONSULT NOTE ADULT - SUBJECTIVE AND OBJECTIVE BOX
NEPHROLOGY CONSULTATION NOTE  History obtained from the chart, her  Zak over the phone ( [phone]) and nursing home RN at [phone]    69 y/o F with PMHx of Alzheimer's dementia, HTN, seizure disorder, NPH s/p drainage, Acute respiratory failure secondary to pneumonia s/p PEG/trach at [facility] on 2017, recurrent UTIs sent from [facility] due to hypotension and blood in stool. As per chart her SBP was around 70/80s in nursing home. As per  she received enema 2-3 days ago at NH and after that the bleeding started. In ER VS were stable and initial hgb was 8. However Hb dropped soon after to 6.9. CRISTAL showed maroon colored stool.   Patient received 1 dose of cefepime and vancomycin each, 1L bolus of IVF and is currently receiving 2U pRBCs.   Renal was called for TYESHA creat 3.1 (it was 0.8 mg% in 2018). On IVF - creat improving, making urine    PAST MEDICAL & SURGICAL HISTORY:  Alzheimer disease    HTN (hypertension)    Tracheostomy present    PEG (percutaneous endoscopic gastrostomy) status      Allergies:  No Known Allergies    Home Medications Reviewed  Hospital Medications:   MEDICATIONS  (STANDING):  bethanechol 10 milliGRAM(s) Oral three times a day  chlorhexidine 4% Liquid 1 Application(s) Topical <User Schedule>  gabapentin 300 milliGRAM(s) Oral <User Schedule>  gabapentin 400 milliGRAM(s) Oral <User Schedule>  hydrALAZINE 25 milliGRAM(s) Oral four times a day  loratadine 10 milliGRAM(s) Oral daily  LORazepam     Tablet 1 milliGRAM(s) Oral two times a day  melatonin 5 milliGRAM(s) Oral at bedtime  meropenem  IVPB 1000 milliGRAM(s) IV Intermittent every 8 hours  multivitamin/minerals 1 Tablet(s) Oral daily  pantoprazole  Injectable 40 milliGRAM(s) IV Push two times a day  saccharomyces boulardii 250 milliGRAM(s) Oral two times a day      SOCIAL HISTORY:  Denies ETOH,Smoking,   FAMILY HISTORY:    Yes        REVIEW OF SYSTEMS:  Unable to obtain, nonverbal  VITALS:  T(F): 99 (21 @ 09:34), Max: 99 (21 @ 09:34)  HR: 99 (21 @ 12:22)  BP: 114/72 (21 @ 12:22)  RR: 20 (21 @ 12:22)  SpO2: 100% (21 @ 12:22)    04-15 @ 07:01  -   @ 07:00  --------------------------------------------------------  IN: 0 mL / OUT: 800 mL / NET: -800 mL     @ 07:01  -   @ 14:48  --------------------------------------------------------  IN: 0 mL / OUT: 1725 mL / NET: -1725 mL          04-15-21 @ 07:01  -  21 @ 07:00  --------------------------------------------------------  IN: 0 mL / OUT: 800 mL / NET: -800 mL    21 @ 07:01  -  21 @ 14:48  --------------------------------------------------------  IN: 0 mL / OUT: 900 mL / NET: -900 mL      I&O's Detail    15 Apr 2021 07:01  -  2021 07:00  --------------------------------------------------------  IN:  Total IN: 0 mL    OUT:    Incontinent per Collection Bag (mL): 800 mL  Total OUT: 800 mL    Total NET: -800 mL      2021 07:01  -  2021 14:48  --------------------------------------------------------  IN:  Total IN: 0 mL    OUT:    Indwelling Catheter - Urethral (mL): 900 mL    Voided (mL): 825 mL  Total OUT: 1725 mL    Total NET: -1725 mL            PHYSICAL EXAM:  Constitutional: on MV  Neck: Trach  Respiratory: CTA  Cardiovascular: S1, S2, RRR  Gastrointestinal: BS+, soft, NT/ND  Extremities: No peripheral edema  Neurological: responds to pain  : has vaughan.   Skin: No rashes  Vascular Access:    LABS:      140  |  103  |  75<HH>  ----------------------------<  122<H>  4.8   |  25  |  2.1<H>    Ca    8.9      2021 05:28  Mg     3.0         TPro  6.1  /  Alb  2.9<L>  /  TBili  <0.2  /  DBili      /  AST  38  /  ALT  64<H>  /  AlkPhos  167<H>      Creatinine Trend: 2.1 <--, 2.5 <--, 3.3 <--                        9.5    15.96 )-----------( 283      ( 2021 05:28 )             28.8     Urine Studies:  Urinalysis Basic - ( 15 Apr 2021 09:44 )    Color: Yellow / Appearance: Slightly Turbid / S.016 / pH:   Gluc:  / Ketone: Negative  / Bili: Negative / Urobili: <2 mg/dL   Blood:  / Protein: 30 mg/dL / Nitrite: Negative   Leuk Esterase: Large / RBC: 5 /HPF /  /HPF   Sq Epi:  / Non Sq Epi: 0 /HPF / Bacteria: Few                RADIOLOGY & ADDITIONAL STUDIES:  < from: CT Angio Abdomen and Pelvis w/ IV Cont (04.15.21 @ 11:24) >  KIDNEYS: Nonobstructing right renal calculi. Subcentimeter right renal hypodensity too small to characterize. No hydronephrosis..    ABDOMINOPELVIC NODES: Unremarkable.    PELVIC ORGANS: Bladder collapsed around Vaughan catheter..    PERITONEUM/MESENTERY/BOWEL: Nondiagnostic study for active GI bleeding as there is dense intraluminal material throughout the colon on precontrast images, may represent previously administered oral contrast, inspissated stool or blood products. Gastrostomy tube tip in the stomach. No evidence of bowel obstruction ascites or free air. Normal caliber appendix.    Large fat density mesenteric structure extending into the right pelvis measuring at least 13.2 x 3.8 cm x 17.1 cm (8/117) with mass effect upon adjacent pelvic structures, suspected to represent a lipoma.    BONES/SOFT TISSUES: Degenerative changes of the spine and hips, right greater than left..    OTHER: Atherosclerotic calcifications.      IMPRESSION:    1.  Nondiagnostic study for active GI bleeding as there is dense intraluminal material throughout the colon on precontrast images, may represent previously administered oral contrast, inspissated stool or possibly blood products.  2.  Patchy bibasilarpulmonary opacities, compatible with pneumonia in the appropriate clinical setting.  3.  Multiple hypoattenuating lesions in the pancreatic body measuring up to 2.7 cm, likely cystic lesions. Recommend evaluation with outpatient contrast-enhanced MRI.  4.  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 Follow-up is recommended.    < end of copied text >

## 2021-04-16 NOTE — PROGRESS NOTE ADULT - ASSESSMENT
IMPRESSION:    Acute blood loss/ anemia  GI bleed sp tx  History of advanced dementia  PEG/Trach  aspiration pneumonia  Renal failure    PLAN:    CNS: Avoid sedation    HEENT: Oral care    PULMONARY:  HOB @ 45 degrees. Aspiration precautions, DTA, keep Sao2 88 to 94%    CARDIOVASCULAR: IVF.    GI: GI prophylaxis.  NPO for now. GI fup, protonix q 12h    RENAL:  Follow up lytes.  Correct as needed, vaughan catheter, renal us, repeat CMP    INFECTIOUS DISEASE: Follow up cultures, meropenem    HEMATOLOGICAL:  DVT prophylaxis with SCDs LE doppler    ENDOCRINE:  Follow up FS.  Insulin protocol if needed.    MUSCULOSKELETAL: Bedrest  floor    change plate to hospitalist

## 2021-04-16 NOTE — PROGRESS NOTE ADULT - SUBJECTIVE AND OBJECTIVE BOX
Gastroenterology progress note:     Patient is a 70y old  Female who presents with a chief complaint of Blood in stool (16 Apr 2021 06:13)       Admitted on: 04-15-21    We are following the patient for: anemia     Interval History:  patient did not receive preparation for prep last night, s/p 2 UPRBC, CRISTAL showed green stool       PAST MEDICAL & SURGICAL HISTORY:  Alzheimer disease    HTN (hypertension)    Tracheostomy present    PEG (percutaneous endoscopic gastrostomy) status        MEDICATIONS  (STANDING):  bethanechol 10 milliGRAM(s) Oral three times a day  cefepime   IVPB      cefepime   IVPB 1000 milliGRAM(s) IV Intermittent every 12 hours  chlorhexidine 4% Liquid 1 Application(s) Topical <User Schedule>  gabapentin 300 milliGRAM(s) Oral <User Schedule>  gabapentin 400 milliGRAM(s) Oral <User Schedule>  hydrALAZINE 25 milliGRAM(s) Oral four times a day  loratadine 10 milliGRAM(s) Oral daily  LORazepam     Tablet 1 milliGRAM(s) Oral two times a day  melatonin 5 milliGRAM(s) Oral at bedtime  multivitamin/minerals 1 Tablet(s) Oral daily  pantoprazole  Injectable 40 milliGRAM(s) IV Push two times a day  saccharomyces boulardii 250 milliGRAM(s) Oral two times a day  sodium chloride 0.9%. 1000 milliLiter(s) (70 mL/Hr) IV Continuous <Continuous>    MEDICATIONS  (PRN):  acetaminophen   Tablet .. 650 milliGRAM(s) Oral every 6 hours PRN Temp greater or equal to 38C (100.4F), Mild Pain (1 - 3)  LORazepam     Tablet 0.5 milliGRAM(s) Oral every 6 hours PRN Agitation      Allergies  No Known Allergies      Review of Systems:   unable to obtain    Physical Examination:  T(C): 36.4 (04-16-21 @ 07:20), Max: 36.7 (04-15-21 @ 20:15)  HR: 96 (04-16-21 @ 07:20) (79 - 98)  BP: 116/64 (04-16-21 @ 07:20) (83/50 - 144/65)  RR: 22 (04-16-21 @ 07:20) (14 - 22)  SpO2: 100% (04-16-21 @ 07:20) (95% - 100%)      04-15-21 @ 07:01  -  04-16-21 @ 07:00  --------------------------------------------------------  IN: 0 mL / OUT: 800 mL / NET: -800 mL    04-16-21 @ 07:01  -  04-16-21 @ 08:57  --------------------------------------------------------  IN: 0 mL / OUT: 900 mL / NET: -900 mL        Respiratory:  trach on ventilator  Cardiovascular:  S1 S2, Regular rate and rhythm.  Abdominal: Abdomen is soft, symmetric, and non-tender without distention. PEG-tube noted at the site. Bowel sounds are present and normoactive in all four quadrants. No masses, hepatomegaly, or splenomegaly are noted.   Neurology:Non-focal  Vascular: No varicose vein, No cyanosis, No edema        Data: (reviewed by attending)                        9.5    15.96 )-----------( 283      ( 16 Apr 2021 05:28 )             28.8     Hgb trend:  9.5  04-16-21 @ 05:28  9.9  04-16-21 @ 00:18  6.9  04-15-21 @ 11:19  8.0  04-15-21 @ 05:20        04-16    140  |  103  |  75<HH>  ----------------------------<  122<H>  4.8   |  25  |  2.1<H>    Ca    8.9      16 Apr 2021 05:28  Mg     3.0     04-16    TPro  6.1  /  Alb  2.9<L>  /  TBili  <0.2  /  DBili  x   /  AST  38  /  ALT  64<H>  /  AlkPhos  167<H>  04-16    Liver panel trend:  TBili <0.2   /   AST 38   /   ALT 64   /   AlkP 167   /   Tptn 6.1   /   Alb 2.9    /   DBili --      04-16  TBili 0.2   /   AST 42   /   ALT 68   /   AlkP 166   /   Tptn 6.3   /   Alb 2.8    /   DBili --      04-16  TBili <0.2   /   AST 62   /   ALT 84   /   AlkP 179   /   Tptn 6.6   /   Alb 3.3    /   DBili --      04-15      PT/INR - ( 15 Apr 2021 05:20 )   PT: 12.30 sec;   INR: 1.07 ratio         PTT - ( 15 Apr 2021 05:20 )  PTT:31.9 sec       Radiology: (reviewed by attending)  CT Angio Abdomen and Pelvis w/ IV Cont:   EXAM:  CT ANGIO ABD PELV (W)AW IC            PROCEDURE DATE:  04/15/2021            INTERPRETATION:   CLINICAL STATEMENT: Lower GI bleeding    TECHNIQUE: Multislice helical sections were obtained from the domes of the diaphragm to the pubic symphysis prior to and following intravenous administration of 100 cc of Omnipaque 350, utilizing CT angiogram GI bleeding protocol. Coronal and sagittal reformatted images and 3-D reconstruction is performed.    COMPARISON CT: None.      FINDINGS:    LOWER CHEST: Patchy bibasilar pulmonary opacities..    HEPATOBILIARY: Subcentimeter right hepatic lobe hypodensity too small to characterize..    SPLEEN: Unremarkable.    PANCREAS: Multiple hypoattenuating lesions in the pancreatic body measuring up to 2.7 cm (10/42).    ADRENAL GLANDS: Unremarkable.    KIDNEYS: Nonobstructing right renal calculi. Subcentimeter right renal hypodensity too small to characterize. No hydronephrosis..    ABDOMINOPELVIC NODES: Unremarkable.    PELVIC ORGANS: Bladder collapsed around Valera catheter..    PERITONEUM/MESENTERY/BOWEL: Nondiagnostic study for active GI bleeding as there is dense intraluminal material throughout the colon on precontrast images, may represent previously administered oral contrast, inspissated stool or blood products. Gastrostomy tube tip in the stomach. No evidence of bowel obstruction ascites or free air. Normal caliber appendix.    Large fat density mesenteric structure extending into the right pelvis measuring at least 13.2 x 3.8 cm x 17.1 cm (8/117) with mass effect upon adjacent pelvic structures, suspected to represent a lipoma.    BONES/SOFT TISSUES: Degenerative changes of the spine and hips, right greater than left..    OTHER: Atherosclerotic calcifications.      IMPRESSION:    1.  Nondiagnostic study for active GI bleeding as there is dense intraluminal material throughout the colon on precontrast images, may represent previously administered oral contrast, inspissated stool or possibly blood products.  2.  Patchy bibasilarpulmonary opacities, compatible with pneumonia in the appropriate clinical setting.  3.  Multiple hypoattenuating lesions in the pancreatic body measuring up to 2.7 cm, likely cystic lesions. Recommend evaluation with outpatient contrast-enhanced MRI.  4.  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 Follow-up is recommended.              JAZZY ALFARO MD; Attending Radiologist  This document has been electronically signed. Apr 15 2021 12:24PM (04-15-21 @ 11:24)

## 2021-04-17 LAB
ALBUMIN SERPL ELPH-MCNC: 2.8 G/DL — LOW (ref 3.5–5.2)
ALP SERPL-CCNC: 156 U/L — HIGH (ref 30–115)
ALT FLD-CCNC: 54 U/L — HIGH (ref 0–41)
ANION GAP SERPL CALC-SCNC: 13 MMOL/L — SIGNIFICANT CHANGE UP (ref 7–14)
AST SERPL-CCNC: 33 U/L — SIGNIFICANT CHANGE UP (ref 0–41)
BASOPHILS # BLD AUTO: 0.02 K/UL — SIGNIFICANT CHANGE UP (ref 0–0.2)
BASOPHILS NFR BLD AUTO: 0.1 % — SIGNIFICANT CHANGE UP (ref 0–1)
BILIRUB SERPL-MCNC: <0.2 MG/DL — SIGNIFICANT CHANGE UP (ref 0.2–1.2)
BLD GP AB SCN SERPL QL: SIGNIFICANT CHANGE UP
BUN SERPL-MCNC: 50 MG/DL — HIGH (ref 10–20)
CALCIUM SERPL-MCNC: 9.1 MG/DL — SIGNIFICANT CHANGE UP (ref 8.5–10.1)
CHLORIDE SERPL-SCNC: 111 MMOL/L — HIGH (ref 98–110)
CO2 SERPL-SCNC: 22 MMOL/L — SIGNIFICANT CHANGE UP (ref 17–32)
COVID-19 SPIKE DOMAIN AB INTERP: POSITIVE
COVID-19 SPIKE DOMAIN ANTIBODY RESULT: 70.3 U/ML — HIGH
CREAT SERPL-MCNC: 1.5 MG/DL — SIGNIFICANT CHANGE UP (ref 0.7–1.5)
CULTURE RESULTS: SIGNIFICANT CHANGE UP
EOSINOPHIL # BLD AUTO: 0.38 K/UL — SIGNIFICANT CHANGE UP (ref 0–0.7)
EOSINOPHIL NFR BLD AUTO: 2.5 % — SIGNIFICANT CHANGE UP (ref 0–8)
GLUCOSE SERPL-MCNC: 97 MG/DL — SIGNIFICANT CHANGE UP (ref 70–99)
HCT VFR BLD CALC: 29.4 % — LOW (ref 37–47)
HGB BLD-MCNC: 9.7 G/DL — LOW (ref 12–16)
IMM GRANULOCYTES NFR BLD AUTO: 5.9 % — HIGH (ref 0.1–0.3)
LYMPHOCYTES # BLD AUTO: 1.03 K/UL — LOW (ref 1.2–3.4)
LYMPHOCYTES # BLD AUTO: 6.8 % — LOW (ref 20.5–51.1)
MAGNESIUM SERPL-MCNC: 2.6 MG/DL — HIGH (ref 1.8–2.4)
MCHC RBC-ENTMCNC: 30.1 PG — SIGNIFICANT CHANGE UP (ref 27–31)
MCHC RBC-ENTMCNC: 33 G/DL — SIGNIFICANT CHANGE UP (ref 32–37)
MCV RBC AUTO: 91.3 FL — SIGNIFICANT CHANGE UP (ref 81–99)
MONOCYTES # BLD AUTO: 1.47 K/UL — HIGH (ref 0.1–0.6)
MONOCYTES NFR BLD AUTO: 9.7 % — HIGH (ref 1.7–9.3)
NEUTROPHILS # BLD AUTO: 11.41 K/UL — HIGH (ref 1.4–6.5)
NEUTROPHILS NFR BLD AUTO: 75 % — SIGNIFICANT CHANGE UP (ref 42.2–75.2)
NRBC # BLD: 0 /100 WBCS — SIGNIFICANT CHANGE UP (ref 0–0)
PLATELET # BLD AUTO: 264 K/UL — SIGNIFICANT CHANGE UP (ref 130–400)
POTASSIUM SERPL-MCNC: 4.6 MMOL/L — SIGNIFICANT CHANGE UP (ref 3.5–5)
POTASSIUM SERPL-SCNC: 4.6 MMOL/L — SIGNIFICANT CHANGE UP (ref 3.5–5)
PROCALCITONIN SERPL-MCNC: 0.26 NG/ML — HIGH (ref 0.02–0.1)
PROT SERPL-MCNC: 6.1 G/DL — SIGNIFICANT CHANGE UP (ref 6–8)
RBC # BLD: 3.22 M/UL — LOW (ref 4.2–5.4)
RBC # FLD: 14.3 % — SIGNIFICANT CHANGE UP (ref 11.5–14.5)
SARS-COV-2 IGG+IGM SERPL QL IA: 70.3 U/ML — HIGH
SARS-COV-2 IGG+IGM SERPL QL IA: POSITIVE
SODIUM SERPL-SCNC: 146 MMOL/L — SIGNIFICANT CHANGE UP (ref 135–146)
SPECIMEN SOURCE: SIGNIFICANT CHANGE UP
WBC # BLD: 15.2 K/UL — HIGH (ref 4.8–10.8)
WBC # FLD AUTO: 15.2 K/UL — HIGH (ref 4.8–10.8)

## 2021-04-17 PROCEDURE — 99223 1ST HOSP IP/OBS HIGH 75: CPT

## 2021-04-17 PROCEDURE — 99232 SBSQ HOSP IP/OBS MODERATE 35: CPT

## 2021-04-17 RX ORDER — SODIUM CHLORIDE 9 MG/ML
1000 INJECTION, SOLUTION INTRAVENOUS
Refills: 0 | Status: DISCONTINUED | OUTPATIENT
Start: 2021-04-17 | End: 2021-04-23

## 2021-04-17 RX ORDER — HEPARIN SODIUM 5000 [USP'U]/ML
5000 INJECTION INTRAVENOUS; SUBCUTANEOUS EVERY 8 HOURS
Refills: 0 | Status: DISCONTINUED | OUTPATIENT
Start: 2021-04-17 | End: 2021-04-23

## 2021-04-17 RX ADMIN — Medication 250 MILLIGRAM(S): at 18:07

## 2021-04-17 RX ADMIN — PANTOPRAZOLE SODIUM 40 MILLIGRAM(S): 20 TABLET, DELAYED RELEASE ORAL at 06:20

## 2021-04-17 RX ADMIN — MEROPENEM 100 MILLIGRAM(S): 1 INJECTION INTRAVENOUS at 13:19

## 2021-04-17 RX ADMIN — Medication 10 MILLIGRAM(S): at 06:20

## 2021-04-17 RX ADMIN — HEPARIN SODIUM 5000 UNIT(S): 5000 INJECTION INTRAVENOUS; SUBCUTANEOUS at 15:04

## 2021-04-17 RX ADMIN — Medication 1 MILLIGRAM(S): at 06:19

## 2021-04-17 RX ADMIN — MEROPENEM 100 MILLIGRAM(S): 1 INJECTION INTRAVENOUS at 21:26

## 2021-04-17 RX ADMIN — MEROPENEM 100 MILLIGRAM(S): 1 INJECTION INTRAVENOUS at 06:19

## 2021-04-17 RX ADMIN — Medication 1 TABLET(S): at 13:18

## 2021-04-17 RX ADMIN — Medication 10 MILLIGRAM(S): at 13:19

## 2021-04-17 RX ADMIN — HEPARIN SODIUM 5000 UNIT(S): 5000 INJECTION INTRAVENOUS; SUBCUTANEOUS at 21:26

## 2021-04-17 RX ADMIN — PANTOPRAZOLE SODIUM 40 MILLIGRAM(S): 20 TABLET, DELAYED RELEASE ORAL at 18:07

## 2021-04-17 RX ADMIN — LORATADINE 10 MILLIGRAM(S): 10 TABLET ORAL at 13:18

## 2021-04-17 RX ADMIN — Medication 10 MILLIGRAM(S): at 21:25

## 2021-04-17 RX ADMIN — Medication 5 MILLIGRAM(S): at 21:26

## 2021-04-17 RX ADMIN — Medication 1 MILLIGRAM(S): at 18:07

## 2021-04-17 RX ADMIN — Medication 25 MILLIGRAM(S): at 06:20

## 2021-04-17 RX ADMIN — SODIUM CHLORIDE 70 MILLILITER(S): 9 INJECTION, SOLUTION INTRAVENOUS at 15:02

## 2021-04-17 NOTE — PROGRESS NOTE ADULT - ASSESSMENT
IMPRESSION:    Acute blood loss/ anemia  GI bleed sp tx  History of advanced dementia  PEG/Trach  aspiration pneumonia  Renal failure    PLAN:    CNS: Avoid sedation    HEENT: Oral care    PULMONARY:  HOB @ 45 degrees. Aspiration precautions, DTA, keep Sao2 88 to 94%    CARDIOVASCULAR: IVF.    GI: GI prophylaxis.  GI fup, protonix q 12h. start feeding    RENAL:  Follow up lytes.  Correct as needed, vaughan catheter, renal us, repeat CMP    INFECTIOUS DISEASE: Follow up cultures, meropenem finis course    HEMATOLOGICAL:  DVT prophylaxis     ENDOCRINE:  Follow up FS.  Insulin protocol if needed.    MUSCULOSKELETAL: Bedrest  floor

## 2021-04-17 NOTE — PROGRESS NOTE ADULT - SUBJECTIVE AND OBJECTIVE BOX
Over Night Events: events noted, no active bleed hb stable      PHYSICAL EXAM    ICU Vital Signs Last 24 Hrs  T(C): 37.4 (2021 06:33), Max: 37.4 (2021 06:33)  T(F): 99.4 (2021 06:33), Max: 99.4 (2021 06:33)  HR: 98 (2021 06:33) (96 - 100)  BP: 128/72 (2021 06:33) (108/70 - 128/72)  RR: 18 (2021 06:33) (18 - 22)  SpO2: 99% (2021 06:33) (98% - 100%)      General: ill looking  HEENT: SIMONE , trach           Lungs: Bl crackles  Cardiovascular: Regular   Abdomen: Soft, Positive BS  Extremities: No clubbing   Neurological: Not following commands  ulcer      04-15-21 @ 07:01  -  21 @ 07:00  --------------------------------------------------------  IN:  Total IN: 0 mL    OUT:    Incontinent per Collection Bag (mL): 800 mL  Total OUT: 800 mL    Total NET: -800 mL      21 @ 07:01  -  21 @ 06:50  --------------------------------------------------------  IN:    Osmolite: 240 mL  Total IN: 240 mL    OUT:    Indwelling Catheter - Urethral (mL): 900 mL    Voided (mL): 825 mL  Total OUT: 1725 mL    Total NET: -1485 mL          LABS:                          9.8    14.66 )-----------( 266      ( 2021 16:24 )             30.1                                                   140  |  103  |  75<HH>  ----------------------------<  122<H>  4.8   |  25  |  2.1<H>    Ca    8.9      2021 05:28  Mg     3.0     -16    TPro  6.1  /  Alb  2.9<L>  /  TBili  <0.2  /  DBili  x   /  AST  38  /  ALT  64<H>  /  AlkPhos  167<H>  -                                             Urinalysis Basic - ( 15 Apr 2021 09:44 )    Color: Yellow / Appearance: Slightly Turbid / S.016 / pH: x  Gluc: x / Ketone: Negative  / Bili: Negative / Urobili: <2 mg/dL   Blood: x / Protein: 30 mg/dL / Nitrite: Negative   Leuk Esterase: Large / RBC: 5 /HPF /  /HPF   Sq Epi: x / Non Sq Epi: 0 /HPF / Bacteria: Few                                                  LIVER FUNCTIONS - ( 2021 05:28 )  Alb: 2.9 g/dL / Pro: 6.1 g/dL / ALK PHOS: 167 U/L / ALT: 64 U/L / AST: 38 U/L / GGT: x                                                  Culture - Blood (collected 15 Apr 2021 11:40)  Source: .Blood Blood-Peripheral  Preliminary Report (2021 23:01):    No growth to date.    Culture - Blood (collected 15 Apr 2021 11:40)  Source: .Blood Blood-Peripheral  Preliminary Report (2021 23:01):    No growth to date.    Culture - Urine (collected 15 Apr 2021 09:44)  Source: .Urine Catheterized  Preliminary Report (2021 02:21):    >100,000 CFU/ml Gram Negative Rods                                                   Mode: AC/ CMV (Assist Control/ Continuous Mandatory Ventilation)  RR (machine): 14  TV (machine): 400  FiO2: 40  PEEP: 5  ITime: 1  MAP: 10  PIP: 16                                          MEDICATIONS  (STANDING):  bethanechol 10 milliGRAM(s) Oral three times a day  chlorhexidine 4% Liquid 1 Application(s) Topical <User Schedule>  hydrALAZINE 25 milliGRAM(s) Oral four times a day  loratadine 10 milliGRAM(s) Oral daily  LORazepam     Tablet 1 milliGRAM(s) Oral two times a day  melatonin 5 milliGRAM(s) Oral at bedtime  meropenem  IVPB 1000 milliGRAM(s) IV Intermittent every 8 hours  multivitamin/minerals 1 Tablet(s) Oral daily  pantoprazole  Injectable 40 milliGRAM(s) IV Push two times a day  saccharomyces boulardii 250 milliGRAM(s) Oral two times a day    MEDICATIONS  (PRN):  acetaminophen   Tablet .. 650 milliGRAM(s) Oral every 6 hours PRN Temp greater or equal to 38C (100.4F), Mild Pain (1 - 3)  LORazepam     Tablet 0.5 milliGRAM(s) Oral every 6 hours PRN Agitation

## 2021-04-17 NOTE — PROGRESS NOTE ADULT - SUBJECTIVE AND OBJECTIVE BOX
pt seen and examined. pt non able to provide information       71 y/o F with PMHx of Alzheimer's dementia, HTN, seizure disorder, NPH s/p drainage, Acute respiratory failure secondary to pneumonia s/p PEG/trach at [facility] on 2017, recurrent UTIs sent from [facility] due to hypotension and blood in stool. As per chart her SBP was around 70/80s in nursing home. As per  she received enema 2-3 days ago at NH and after that the bleeding started. In ER VS were stable and initial hgb was 8. However Hb dropped soon after to 6.9. CRISTAL showed maroon colored stool.   Patient received 1 dose of cefepime and vancomycin each, 1L bolus of IVF and is currently receiving 2U pRBCs.    (15 Apr 2021 16:08)    Vital Signs Last 24 Hrs  T(C): 36.8 (17 Apr 2021 07:45), Max: 37.4 (17 Apr 2021 06:33)  T(F): 98.2 (17 Apr 2021 07:45), Max: 99.4 (17 Apr 2021 06:33)  HR: 98 (17 Apr 2021 07:45) (98 - 100)  BP: 124/70 (17 Apr 2021 07:45) (108/70 - 128/72)  BP(mean): --  RR: 18 (17 Apr 2021 07:45) (18 - 20)  SpO2: 98% (17 Apr 2021 08:56) (98% - 99%)      16 Apr 2021 07:01  -  17 Apr 2021 07:00  --------------------------------------------------------  IN: 240 mL / OUT: 1725 mL / NET: -1485 mL    Physical exam:   constitutional NAD, AAOX3, Respiratory  lungs CTA, CVS heart RRR, GI: abdomen Soft NT, ND, BS+, skin: intact  neuro exam non focal.                           9.7    15.20 )-----------( 264      ( 17 Apr 2021 04:30 )             29.4     04-17    146  |  111<H>  |  50<H>  ----------------------------<  97  4.6   |  22  |  1.5    Ca    9.1      17 Apr 2021 04:30  Mg     2.6     04-17    TPro  6.1  /  Alb  2.8<L>  /  TBili  <0.2  /  DBili  x   /  AST  33  /  ALT  54<H>  /  AlkPhos  156<H>  04-17    Culture - Blood (collected 15 Apr 2021 11:40)  Source: .Blood Blood-Peripheral  Preliminary Report (16 Apr 2021 23:01):    No growth to date.    Culture - Blood (collected 15 Apr 2021 11:40)  Source: .Blood Blood-Peripheral  Preliminary Report (16 Apr 2021 23:01):    No growth to date.    Culture - Urine (collected 15 Apr 2021 09:44)  Source: .Urine Catheterized  Final Report (17 Apr 2021 09:29):    >=3 organisms. Probable collection contamination.    MEDICATIONS  (STANDING):  bethanechol 10 milliGRAM(s) Oral three times a day  chlorhexidine 4% Liquid 1 Application(s) Topical <User Schedule>  hydrALAZINE 25 milliGRAM(s) Oral four times a day  loratadine 10 milliGRAM(s) Oral daily  LORazepam     Tablet 1 milliGRAM(s) Oral two times a day  melatonin 5 milliGRAM(s) Oral at bedtime  meropenem  IVPB 1000 milliGRAM(s) IV Intermittent every 8 hours  multivitamin/minerals 1 Tablet(s) Oral daily  pantoprazole  Injectable 40 milliGRAM(s) IV Push two times a day  saccharomyces boulardii 250 milliGRAM(s) Oral two times a day    MEDICATIONS  (PRN):  acetaminophen   Tablet .. 650 milliGRAM(s) Oral every 6 hours PRN Temp greater or equal to 38C (100.4F), Mild Pain (1 - 3)  LORazepam     Tablet 0.5 milliGRAM(s) Oral every 6 hours PRN Agitation      PAST MEDICAL & SURGICAL HISTORY:  Alzheimer disease    HTN (hypertension)    Tracheostomy present    PEG (percutaneous endoscopic gastrostomy) status    a/p  # GIB as per GI probably due to hemorrhoids, now stable not bleeding  # peg clogged, changed by GI team, now working  # dementia , sp trach, HTN cont current meds and care,   # UTI< leukocytosis, positive UA , multiple organisms growing, on radha, ID eval,   #Progress Note Handoff  Pending (specify):  Consults_____ID ____, Tests____cultures____, Test Results_______, Other_________  Family discussion:   Disposition: SNF  DNR DNI      pt seen and examined. pt non able to provide information       71 y/o F with PMHx of Alzheimer's dementia, HTN, seizure disorder, NPH s/p drainage, Acute respiratory failure secondary to pneumonia s/p PEG/trach at [facility] on 2017, recurrent UTIs sent from [facility] due to hypotension and blood in stool. As per chart her SBP was around 70/80s in nursing home. As per  she received enema 2-3 days ago at NH and after that the bleeding started. In ER VS were stable and initial hgb was 8. However Hb dropped soon after to 6.9. CRISTAL showed maroon colored stool.   Patient received 1 dose of cefepime and vancomycin each, 1L bolus of IVF and is currently receiving 2U pRBCs.    (15 Apr 2021 16:08)    Vital Signs Last 24 Hrs  T(C): 36.8 (17 Apr 2021 07:45), Max: 37.4 (17 Apr 2021 06:33)  T(F): 98.2 (17 Apr 2021 07:45), Max: 99.4 (17 Apr 2021 06:33)  HR: 98 (17 Apr 2021 07:45) (98 - 100)  BP: 124/70 (17 Apr 2021 07:45) (108/70 - 128/72)  BP(mean): --  RR: 18 (17 Apr 2021 07:45) (18 - 20)  SpO2: 98% (17 Apr 2021 08:56) (98% - 99%)      16 Apr 2021 07:01  -  17 Apr 2021 07:00  --------------------------------------------------------  IN: 240 mL / OUT: 1725 mL / NET: -1485 mL    Physical exam:   constitutional NAD, AAOX3, Respiratory  lungs CTA, CVS heart RRR, GI: abdomen Soft NT, ND, BS+, skin: intact  neuro exam non focal.                           9.7    15.20 )-----------( 264      ( 17 Apr 2021 04:30 )             29.4     04-17    146  |  111<H>  |  50<H>  ----------------------------<  97  4.6   |  22  |  1.5    Ca    9.1      17 Apr 2021 04:30  Mg     2.6     04-17    TPro  6.1  /  Alb  2.8<L>  /  TBili  <0.2  /  DBili  x   /  AST  33  /  ALT  54<H>  /  AlkPhos  156<H>  04-17    Culture - Blood (collected 15 Apr 2021 11:40)  Source: .Blood Blood-Peripheral  Preliminary Report (16 Apr 2021 23:01):    No growth to date.    Culture - Blood (collected 15 Apr 2021 11:40)  Source: .Blood Blood-Peripheral  Preliminary Report (16 Apr 2021 23:01):    No growth to date.    Culture - Urine (collected 15 Apr 2021 09:44)  Source: .Urine Catheterized  Final Report (17 Apr 2021 09:29):    >=3 organisms. Probable collection contamination.    MEDICATIONS  (STANDING):  bethanechol 10 milliGRAM(s) Oral three times a day  chlorhexidine 4% Liquid 1 Application(s) Topical <User Schedule>  hydrALAZINE 25 milliGRAM(s) Oral four times a day  loratadine 10 milliGRAM(s) Oral daily  LORazepam     Tablet 1 milliGRAM(s) Oral two times a day  melatonin 5 milliGRAM(s) Oral at bedtime  meropenem  IVPB 1000 milliGRAM(s) IV Intermittent every 8 hours  multivitamin/minerals 1 Tablet(s) Oral daily  pantoprazole  Injectable 40 milliGRAM(s) IV Push two times a day  saccharomyces boulardii 250 milliGRAM(s) Oral two times a day    MEDICATIONS  (PRN):  acetaminophen   Tablet .. 650 milliGRAM(s) Oral every 6 hours PRN Temp greater or equal to 38C (100.4F), Mild Pain (1 - 3)  LORazepam     Tablet 0.5 milliGRAM(s) Oral every 6 hours PRN Agitation      PAST MEDICAL & SURGICAL HISTORY:  Alzheimer disease    HTN (hypertension)    Tracheostomy present    PEG (percutaneous endoscopic gastrostomy) status    a/p  # GIB as per GI probably due to hemorrhoids, now stable not bleeding  # peg clogged, changed by GI team, now working  # dementia , sp trach, HTN cont current meds and care,   # UTI< leukocytosis, positive UA , multiple organisms growing, on radha, ID eval,   #Progress Note Handoff  Pending (specify):  Consults_____ID ____, Tests____cultures____, Test Results_______, Other_________  Family discussion:  : Dr Pa , discussed plan of care   Disposition: SNF  DNR DNI

## 2021-04-18 LAB
ALBUMIN SERPL ELPH-MCNC: 2.8 G/DL — LOW (ref 3.5–5.2)
ALP SERPL-CCNC: 135 U/L — HIGH (ref 30–115)
ALT FLD-CCNC: 44 U/L — HIGH (ref 0–41)
ANION GAP SERPL CALC-SCNC: 14 MMOL/L — SIGNIFICANT CHANGE UP (ref 7–14)
AST SERPL-CCNC: 26 U/L — SIGNIFICANT CHANGE UP (ref 0–41)
BASOPHILS # BLD AUTO: 0.12 K/UL — SIGNIFICANT CHANGE UP (ref 0–0.2)
BASOPHILS NFR BLD AUTO: 0.7 % — SIGNIFICANT CHANGE UP (ref 0–1)
BILIRUB SERPL-MCNC: <0.2 MG/DL — SIGNIFICANT CHANGE UP (ref 0.2–1.2)
BUN SERPL-MCNC: 39 MG/DL — HIGH (ref 10–20)
CALCIUM SERPL-MCNC: 8.9 MG/DL — SIGNIFICANT CHANGE UP (ref 8.5–10.1)
CHLORIDE SERPL-SCNC: 109 MMOL/L — SIGNIFICANT CHANGE UP (ref 98–110)
CO2 SERPL-SCNC: 23 MMOL/L — SIGNIFICANT CHANGE UP (ref 17–32)
CREAT SERPL-MCNC: 1.4 MG/DL — SIGNIFICANT CHANGE UP (ref 0.7–1.5)
EOSINOPHIL # BLD AUTO: 0.49 K/UL — SIGNIFICANT CHANGE UP (ref 0–0.7)
EOSINOPHIL NFR BLD AUTO: 3 % — SIGNIFICANT CHANGE UP (ref 0–8)
GLUCOSE SERPL-MCNC: 184 MG/DL — HIGH (ref 70–99)
HCT VFR BLD CALC: 28.8 % — LOW (ref 37–47)
HGB BLD-MCNC: 9.1 G/DL — LOW (ref 12–16)
IMM GRANULOCYTES NFR BLD AUTO: 4.3 % — HIGH (ref 0.1–0.3)
LYMPHOCYTES # BLD AUTO: 1.34 K/UL — SIGNIFICANT CHANGE UP (ref 1.2–3.4)
LYMPHOCYTES # BLD AUTO: 8.3 % — LOW (ref 20.5–51.1)
MCHC RBC-ENTMCNC: 29.5 PG — SIGNIFICANT CHANGE UP (ref 27–31)
MCHC RBC-ENTMCNC: 31.6 G/DL — LOW (ref 32–37)
MCV RBC AUTO: 93.5 FL — SIGNIFICANT CHANGE UP (ref 81–99)
MONOCYTES # BLD AUTO: 1.02 K/UL — HIGH (ref 0.1–0.6)
MONOCYTES NFR BLD AUTO: 6.3 % — SIGNIFICANT CHANGE UP (ref 1.7–9.3)
NEUTROPHILS # BLD AUTO: 12.46 K/UL — HIGH (ref 1.4–6.5)
NEUTROPHILS NFR BLD AUTO: 77.4 % — HIGH (ref 42.2–75.2)
NRBC # BLD: 0 /100 WBCS — SIGNIFICANT CHANGE UP (ref 0–0)
PLATELET # BLD AUTO: 225 K/UL — SIGNIFICANT CHANGE UP (ref 130–400)
POTASSIUM SERPL-MCNC: 4.4 MMOL/L — SIGNIFICANT CHANGE UP (ref 3.5–5)
POTASSIUM SERPL-SCNC: 4.4 MMOL/L — SIGNIFICANT CHANGE UP (ref 3.5–5)
PROT SERPL-MCNC: 5.9 G/DL — LOW (ref 6–8)
RBC # BLD: 3.08 M/UL — LOW (ref 4.2–5.4)
RBC # FLD: 14.5 % — SIGNIFICANT CHANGE UP (ref 11.5–14.5)
SODIUM SERPL-SCNC: 146 MMOL/L — SIGNIFICANT CHANGE UP (ref 135–146)
WBC # BLD: 16.13 K/UL — HIGH (ref 4.8–10.8)
WBC # FLD AUTO: 16.13 K/UL — HIGH (ref 4.8–10.8)

## 2021-04-18 PROCEDURE — 99232 SBSQ HOSP IP/OBS MODERATE 35: CPT

## 2021-04-18 RX ADMIN — Medication 25 MILLIGRAM(S): at 23:44

## 2021-04-18 RX ADMIN — Medication 10 MILLIGRAM(S): at 13:51

## 2021-04-18 RX ADMIN — LORATADINE 10 MILLIGRAM(S): 10 TABLET ORAL at 11:34

## 2021-04-18 RX ADMIN — MEROPENEM 100 MILLIGRAM(S): 1 INJECTION INTRAVENOUS at 05:58

## 2021-04-18 RX ADMIN — Medication 1 TABLET(S): at 11:34

## 2021-04-18 RX ADMIN — Medication 250 MILLIGRAM(S): at 17:11

## 2021-04-18 RX ADMIN — Medication 10 MILLIGRAM(S): at 05:55

## 2021-04-18 RX ADMIN — PANTOPRAZOLE SODIUM 40 MILLIGRAM(S): 20 TABLET, DELAYED RELEASE ORAL at 05:56

## 2021-04-18 RX ADMIN — PANTOPRAZOLE SODIUM 40 MILLIGRAM(S): 20 TABLET, DELAYED RELEASE ORAL at 17:11

## 2021-04-18 RX ADMIN — Medication 1 MILLIGRAM(S): at 05:58

## 2021-04-18 RX ADMIN — HEPARIN SODIUM 5000 UNIT(S): 5000 INJECTION INTRAVENOUS; SUBCUTANEOUS at 13:51

## 2021-04-18 RX ADMIN — Medication 25 MILLIGRAM(S): at 11:34

## 2021-04-18 RX ADMIN — HEPARIN SODIUM 5000 UNIT(S): 5000 INJECTION INTRAVENOUS; SUBCUTANEOUS at 23:43

## 2021-04-18 RX ADMIN — HEPARIN SODIUM 5000 UNIT(S): 5000 INJECTION INTRAVENOUS; SUBCUTANEOUS at 05:55

## 2021-04-18 RX ADMIN — Medication 5 MILLIGRAM(S): at 23:44

## 2021-04-18 RX ADMIN — Medication 10 MILLIGRAM(S): at 23:46

## 2021-04-18 RX ADMIN — Medication 1 MILLIGRAM(S): at 17:11

## 2021-04-18 RX ADMIN — Medication 250 MILLIGRAM(S): at 05:55

## 2021-04-18 RX ADMIN — Medication 650 MILLIGRAM(S): at 16:21

## 2021-04-18 RX ADMIN — MEROPENEM 100 MILLIGRAM(S): 1 INJECTION INTRAVENOUS at 13:51

## 2021-04-18 NOTE — CONSULT NOTE ADULT - ASSESSMENT
ASSESSMENT  69 y/o F with PMHx of Alzheimer's dementia, HTN, seizure disorder, NPH s/p drainage, Acute respiratory failure secondary to pneumonia s/p PEG/trach at [facility] on 2017, recurrent UTIs sent from [facility] due to hypotension and blood in stool. As per chart her SBP was around 70/80s in nursing home. As per  she received enema 2-3 days ago at NH and after that the bleeding started. In ER VS were stable and initial hgb was 8. However Hb dropped soon after to 6.9. CRISTAL showed maroon colored stool.       IMPRESSION  #Leukocytosis likely reactive secondary to GIB    afebrile since admission    WBC 19--> 15    4/15 BCX NGTD     4/15 UCX   >=3 organisms. Probable collection contamination. UA     procalcitonin unremarkable   < from: CT Angio Abdomen and Pelvis w/ IV Cont (04.15.21 @ 11:24) >  1.  Nondiagnostic study for active GI bleeding as there is dense intraluminal material throughout the colon on precontrast images, may represent previously administered oral contrast, inspissated stool or possibly blood products.  2.  Patchy bibasilarpulmonary opacities, compatible with pneumonia in the appropriate clinical setting.  3.  Multiple hypoattenuating lesions in the pancreatic body measuring up to 2.7 cm, likely cystic lesions. Recommend evaluation with outpatient contrast-enhanced MRI.  4.  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 Follow-up is recommended.  #GIB  #Transaminitis   #Sepsis ruled out on admission     RECOMMENDATIONS  This is an incomplete consult note. All recommendations to follow after interview and examination of the patient.     If any questions, please call or send a message on [employer] Teams  Spectra [phone]   ASSESSMENT  69 y/o F with PMHx of Alzheimer's dementia, HTN, seizure disorder, NPH s/p drainage, Acute respiratory failure secondary to pneumonia s/p PEG/trach at [facility] on 2017, recurrent UTIs sent from [facility] due to hypotension and blood in stool. As per chart her SBP was around 70/80s in nursing home. As per  she received enema 2-3 days ago at NH and after that the bleeding started. In ER VS were stable and initial hgb was 8. However Hb dropped soon after to 6.9. CRISTAL showed maroon colored stool.       IMPRESSION  #Leukocytosis likely reactive secondary to GIB    afebrile since admission    WBC 19--> 15    4/15 BCX NGTD     4/15 UCX   >=3 organisms. Probable collection contamination. UA     procalcitonin unremarkable   < from: CT Angio Abdomen and Pelvis w/ IV Cont (04.15.21 @ 11:24) >  1.  Nondiagnostic study for active GI bleeding as there is dense intraluminal material throughout the colon on precontrast images, may represent previously administered oral contrast, inspissated stool or possibly blood products.  2.  Patchy bibasilarpulmonary opacities, compatible with pneumonia in the appropriate clinical setting.  3.  Multiple hypoattenuating lesions in the pancreatic body measuring up to 2.7 cm, likely cystic lesions. Recommend evaluation with outpatient contrast-enhanced MRI.  4.  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 Follow-up is recommended.  #GIB  #Transaminitis   #Sepsis ruled out on admission     RECOMMENDATIONS  - D/C ABX and monitor (s/p 3 days)    If any questions, please call or send a message on [employer] Teams  Spectra [phone]

## 2021-04-18 NOTE — PROGRESS NOTE ADULT - ASSESSMENT
IMPRESSION:    Acute blood loss/ anemia stable  GI bleed sp tx  History of advanced dementia  PEG/Trach  aspiration pneumonia  Renal failure improving    PLAN:    CNS: Avoid sedation    HEENT: Oral care    PULMONARY:  HOB @ 45 degrees. Aspiration precautions, DTA, keep Sao2 88 to 94%    CARDIOVASCULAR: dc IVF    GI: GI prophylaxis.  GI fup, protonix q 12h. feeding    RENAL:  Follow up lytes.  Correct as needed,    INFECTIOUS DISEASE: Follow up cultures, meropenem finisH course    HEMATOLOGICAL:  DVT prophylaxis     ENDOCRINE:  Follow up FS.  Insulin protocol if needed.    MUSCULOSKELETAL: Bedrest  DC planning     poor prognosis

## 2021-04-18 NOTE — CONSULT NOTE ADULT - SUBJECTIVE AND OBJECTIVE BOX
DAWN SALOMON  70y, Female  Allergy: No Known Allergies      CHIEF COMPLAINT:   Blood in stool (18 Apr 2021 06:52)      LOS  3d    HPI  HPI:  History obtained from the chart, her  Zak over the phone ( [phone]) and nursing home RN at [phone]    71 y/o F with PMHx of Alzheimer's dementia, HTN, seizure disorder, NPH s/p drainage, Acute respiratory failure secondary to pneumonia s/p PEG/trach at [facility] on 2017, recurrent UTIs sent from [facility] due to hypotension and blood in stool. As per chart her SBP was around 70/80s in nursing home. As per  she received enema 2-3 days ago at NH and after that the bleeding started. In ER VS were stable and initial hgb was 8. However Hb dropped soon after to 6.9. CRISTAL showed maroon colored stool.   Patient received 1 dose of cefepime and vancomycin each, 1L bolus of IVF and is currently receiving 2U pRBCs.    (15 Apr 2021 16:08)      INFECTIOUS DISEASE HISTORY:  ID consulted for leukocytosis, s/p cefepime/flagyl, started on meropenem  cannot obtain further history from the patient secondary to altered mental status or sedation     PMH  PAST MEDICAL & SURGICAL HISTORY:  Alzheimer disease    HTN (hypertension)    Tracheostomy present    PEG (percutaneous endoscopic gastrostomy) status        FAMILY HISTORY  non-contributory     SOCIAL HISTORY  Social History:  unable to obtain history secondary to patient's mental status and/or sedation       ROS  unable to obtain history secondary to patient's mental status and/or sedation     VITALS:  T(F): 98.5, Max: 99.3 (04-17-21 @ 22:26)  HR: 90  BP: 113/76  RR: 20Vital Signs Last 24 Hrs  T(C): 36.9 (18 Apr 2021 06:16), Max: 37.4 (17 Apr 2021 22:26)  T(F): 98.5 (18 Apr 2021 06:16), Max: 99.3 (17 Apr 2021 22:26)  HR: 90 (18 Apr 2021 06:16) (73 - 98)  BP: 113/76 (18 Apr 2021 06:16) (102/70 - 129/73)  BP(mean): --  RR: 20 (18 Apr 2021 06:16) (18 - 20)  SpO2: 99% (18 Apr 2021 06:16) (98% - 99%)    PHYSICAL EXAM:  Gen: chronically ill appearing trach  HEENT: Normocephalic, atraumatic  Neck: supple, no lymphadenopathy  CV: Regular rate & regular rhythm  Lungs: decreased BS at bases, no fremitus  Abdomen: Soft, BS present PEG  Ext: Warm, well perfused  Neuro: awake  Skin: no rash, no erythema  Lines: no phlebitis     TESTS & MEASUREMENTS:                        9.7    15.20 )-----------( 264      ( 17 Apr 2021 04:30 )             29.4     04-17    146  |  111<H>  |  50<H>  ----------------------------<  97  4.6   |  22  |  1.5    Ca    9.1      17 Apr 2021 04:30  Mg     2.6     04-17    TPro  6.1  /  Alb  2.8<L>  /  TBili  <0.2  /  DBili  x   /  AST  33  /  ALT  54<H>  /  AlkPhos  156<H>  04-17      LIVER FUNCTIONS - ( 17 Apr 2021 04:30 )  Alb: 2.8 g/dL / Pro: 6.1 g/dL / ALK PHOS: 156 U/L / ALT: 54 U/L / AST: 33 U/L / GGT: x               Culture - Blood (collected 04-15-21 @ 11:40)  Source: .Blood Blood-Peripheral  Preliminary Report (04-16-21 @ 23:01):    No growth to date.    Culture - Blood (collected 04-15-21 @ 11:40)  Source: .Blood Blood-Peripheral  Preliminary Report (04-16-21 @ 23:01):    No growth to date.    Culture - Urine (collected 04-15-21 @ 09:44)  Source: .Urine Catheterized  Final Report (04-17-21 @ 09:29):    >=3 organisms. Probable collection contamination.            INFECTIOUS DISEASES TESTING  Procalcitonin, Serum: 0.26 ng/mL (04-17-21 @ 04:30)  Rapid RVP Result: NotDetec (04-15-21 @ 06:50)      INFLAMMATORY MARKERS      RADIOLOGY & ADDITIONAL TESTS:  I have personally reviewed the last Chest xray  CXR      CT      CARDIOLOGY TESTING  12 Lead ECG:   Ventricular Rate 86 BPM    Atrial Rate 86 BPM    P-R Interval 176 ms    QRS Duration 126 ms    Q-T Interval 378 ms    QTC Calculation(Bazett) 452 ms    P Axis 68 degrees    R Axis 6 degrees    T Axis 117 degrees    Diagnosis Line Normal sinus rhythm  Non-specific intra-ventricular conduction block  T wave abnormality, consider lateral ischemia  Abnormal ECG    Confirmed by Logan Adams ([id number]) on 4/15/2021 7:46:47 AM (04-15-21 @ 05:36)      MEDICATIONS  bethanechol 10 Oral three times a day  chlorhexidine 4% Liquid 1 Topical <User Schedule>  heparin SubCutaneous Injection - Peds 5000 SubCutaneous every 8 hours  hydrALAZINE 25 Oral four times a day  loratadine 10 Oral daily  LORazepam     Tablet 1 Oral two times a day  melatonin 5 Oral at bedtime  meropenem  IVPB 1000 IV Intermittent every 8 hours  multivitamin/minerals 1 Oral daily  pantoprazole  Injectable 40 IV Push two times a day  saccharomyces boulardii 250 Oral two times a day  sodium chloride 0.45%. 1000 IV Continuous <Continuous>      Weight      ANTIBIOTICS:  meropenem  IVPB 1000 milliGRAM(s) IV Intermittent every 8 hours      ALLERGIES:  No Known Allergies

## 2021-04-18 NOTE — PROGRESS NOTE ADULT - SUBJECTIVE AND OBJECTIVE BOX
OVERNIGHT EVENTS: events noted, afebrile    Vital Signs Last 24 Hrs  T(C): 36.9 (18 Apr 2021 06:16), Max: 37.4 (17 Apr 2021 22:26)  T(F): 98.5 (18 Apr 2021 06:16), Max: 99.3 (17 Apr 2021 22:26)  HR: 90 (18 Apr 2021 06:16) (73 - 98)  BP: 113/76 (18 Apr 2021 06:16) (102/70 - 129/73)  RR: 20 (18 Apr 2021 06:16) (18 - 20)  SpO2: 99% (18 Apr 2021 06:16) (98% - 99%)    PHYSICAL EXAMINATION:    GENERAL: ill looking    HEENT: Head is normocephalic and atraumatic.     NECK: Supple.    LUNGS: bl rhonchi    HEART: LAMONT 3/6    ABDOMEN: Soft, nontender, and nondistended.      EXTREMITIES: swelling+    NEUROLOGIC: not following commands    SKIN: ulcer      LABS:                        9.7    15.20 )-----------( 264      ( 17 Apr 2021 04:30 )             29.4     04-17    146  |  111<H>  |  50<H>  ----------------------------<  97  4.6   |  22  |  1.5    Ca    9.1      17 Apr 2021 04:30  Mg     2.6     04-17    TPro  6.1  /  Alb  2.8<L>  /  TBili  <0.2  /  DBili  x   /  AST  33  /  ALT  54<H>  /  AlkPhos  156<H>  04-17                    Procalcitonin, Serum: 0.26 ng/mL (04-17-21 @ 04:30)        04-16-21 @ 07:01  -  04-17-21 @ 07:00  --------------------------------------------------------  IN: 240 mL / OUT: 1725 mL / NET: -1485 mL    04-17-21 @ 07:01  -  04-18-21 @ 06:53  --------------------------------------------------------  IN: 474 mL / OUT: 1950 mL / NET: -1476 mL        MICROBIOLOGY:  Culture Results:   No growth to date. (04-15 @ 11:40)  Culture Results:   No growth to date. (04-15 @ 11:40)  Culture Results:   >=3 organisms. Probable collection contamination. (04-15 @ 09:44)      MEDICATIONS  (STANDING):  bethanechol 10 milliGRAM(s) Oral three times a day  chlorhexidine 4% Liquid 1 Application(s) Topical <User Schedule>  heparin SubCutaneous Injection - Peds 5000 Unit(s) SubCutaneous every 8 hours  hydrALAZINE 25 milliGRAM(s) Oral four times a day  loratadine 10 milliGRAM(s) Oral daily  LORazepam     Tablet 1 milliGRAM(s) Oral two times a day  melatonin 5 milliGRAM(s) Oral at bedtime  meropenem  IVPB 1000 milliGRAM(s) IV Intermittent every 8 hours  multivitamin/minerals 1 Tablet(s) Oral daily  pantoprazole  Injectable 40 milliGRAM(s) IV Push two times a day  saccharomyces boulardii 250 milliGRAM(s) Oral two times a day  sodium chloride 0.45%. 1000 milliLiter(s) (70 mL/Hr) IV Continuous <Continuous>    MEDICATIONS  (PRN):  acetaminophen   Tablet .. 650 milliGRAM(s) Oral every 6 hours PRN Temp greater or equal to 38C (100.4F), Mild Pain (1 - 3)  LORazepam     Tablet 0.5 milliGRAM(s) Oral every 6 hours PRN Agitation      RADIOLOGY & ADDITIONAL STUDIES:

## 2021-04-18 NOTE — PROGRESS NOTE ADULT - SUBJECTIVE AND OBJECTIVE BOX
Patient is a 70y old  Female who presents with a chief complaint of Blood in stool (18 Apr 2021 07:04)        SUBJECTIVE: Does not appear to be in distress. Remains on MV      REVIEW OF SYSTEMS:  See HPI    PHYSICAL EXAM  Vital Signs Last 24 Hrs  T(C): 37.6 (18 Apr 2021 09:37), Max: 37.6 (18 Apr 2021 09:37)  T(F): 99.6 (18 Apr 2021 09:37), Max: 99.6 (18 Apr 2021 09:37)  HR: 104 (18 Apr 2021 09:37) (73 - 104)  BP: 142/75 (18 Apr 2021 09:37) (102/70 - 142/75)  RR: 20 (18 Apr 2021 09:37) (18 - 20)  SpO2: 99% (18 Apr 2021 09:37) (98% - 99%)    General: In NAD  HEENT: SIMONE, + trach              Lymphatic system: No Cervical LN    Respiratory: Ramakrishna BS  Cardiovascular: Regular  Gastrointestinal: Soft. + BS  Musculoskeletal: No clubbing.  moves all extremities.  Full range of motion    Skin: Warm.  Intact  Neurological: Not following commands, awake not alert      04-17-21 @ 07:01  -  04-18-21 @ 07:00  --------------------------------------------------------  IN:    Osmolite: 474 mL  Total IN: 474 mL    OUT:    Indwelling Catheter - Urethral (mL): 1950 mL  Total OUT: 1950 mL    Total NET: -1476 mL      04-18-21 @ 07:01  -  04-18-21 @ 14:10  --------------------------------------------------------  IN:    Osmolite: 240 mL  Total IN: 240 mL    OUT:  Total OUT: 0 mL    Total NET: 240 mL          LABS:                          9.1    16.13 )-----------( 225      ( 18 Apr 2021 05:54 )             28.8                                               04-18    146  |  109  |  39<H>  ----------------------------<  184<H>  4.4   |  23  |  1.4    Ca    8.9      18 Apr 2021 05:54  Mg     2.6     04-17    TPro  5.9<L>  /  Alb  2.8<L>  /  TBili  <0.2  /  DBili  x   /  AST  26  /  ALT  44<H>  /  AlkPhos  135<H>  04-18                                                 LIVER FUNCTIONS - ( 18 Apr 2021 05:54 )  Alb: 2.8 g/dL / Pro: 5.9 g/dL / ALK PHOS: 135 U/L / ALT: 44 U/L / AST: 26 U/L / GGT: x                                                                                                MEDICATIONS  (STANDING):  bethanechol 10 milliGRAM(s) Oral three times a day  chlorhexidine 4% Liquid 1 Application(s) Topical <User Schedule>  heparin SubCutaneous Injection - Peds 5000 Unit(s) SubCutaneous every 8 hours  hydrALAZINE 25 milliGRAM(s) Oral four times a day  loratadine 10 milliGRAM(s) Oral daily  LORazepam     Tablet 1 milliGRAM(s) Oral two times a day  melatonin 5 milliGRAM(s) Oral at bedtime  meropenem  IVPB 1000 milliGRAM(s) IV Intermittent every 8 hours  multivitamin/minerals 1 Tablet(s) Oral daily  pantoprazole  Injectable 40 milliGRAM(s) IV Push two times a day  saccharomyces boulardii 250 milliGRAM(s) Oral two times a day  sodium chloride 0.45%. 1000 milliLiter(s) (70 mL/Hr) IV Continuous <Continuous>    MEDICATIONS  (PRN):  acetaminophen   Tablet .. 650 milliGRAM(s) Oral every 6 hours PRN Temp greater or equal to 38C (100.4F), Mild Pain (1 - 3)  LORazepam     Tablet 0.5 milliGRAM(s) Oral every 6 hours PRN Agitation

## 2021-04-19 LAB — SARS-COV-2 RNA SPEC QL NAA+PROBE: SIGNIFICANT CHANGE UP

## 2021-04-19 PROCEDURE — 99232 SBSQ HOSP IP/OBS MODERATE 35: CPT

## 2021-04-19 PROCEDURE — 99233 SBSQ HOSP IP/OBS HIGH 50: CPT

## 2021-04-19 RX ADMIN — CHLORHEXIDINE GLUCONATE 1 APPLICATION(S): 213 SOLUTION TOPICAL at 06:39

## 2021-04-19 RX ADMIN — Medication 1 TABLET(S): at 11:38

## 2021-04-19 RX ADMIN — PANTOPRAZOLE SODIUM 40 MILLIGRAM(S): 20 TABLET, DELAYED RELEASE ORAL at 06:37

## 2021-04-19 RX ADMIN — Medication 1 MILLIGRAM(S): at 18:11

## 2021-04-19 RX ADMIN — Medication 250 MILLIGRAM(S): at 18:10

## 2021-04-19 RX ADMIN — HEPARIN SODIUM 5000 UNIT(S): 5000 INJECTION INTRAVENOUS; SUBCUTANEOUS at 15:33

## 2021-04-19 RX ADMIN — LORATADINE 10 MILLIGRAM(S): 10 TABLET ORAL at 11:38

## 2021-04-19 RX ADMIN — Medication 650 MILLIGRAM(S): at 18:11

## 2021-04-19 RX ADMIN — Medication 250 MILLIGRAM(S): at 06:37

## 2021-04-19 RX ADMIN — Medication 25 MILLIGRAM(S): at 11:38

## 2021-04-19 RX ADMIN — Medication 10 MILLIGRAM(S): at 06:36

## 2021-04-19 RX ADMIN — SODIUM CHLORIDE 70 MILLILITER(S): 9 INJECTION, SOLUTION INTRAVENOUS at 07:05

## 2021-04-19 RX ADMIN — Medication 25 MILLIGRAM(S): at 18:10

## 2021-04-19 RX ADMIN — Medication 1 MILLIGRAM(S): at 06:36

## 2021-04-19 RX ADMIN — Medication 10 MILLIGRAM(S): at 15:33

## 2021-04-19 RX ADMIN — HEPARIN SODIUM 5000 UNIT(S): 5000 INJECTION INTRAVENOUS; SUBCUTANEOUS at 06:39

## 2021-04-19 RX ADMIN — Medication 25 MILLIGRAM(S): at 06:38

## 2021-04-19 RX ADMIN — PANTOPRAZOLE SODIUM 40 MILLIGRAM(S): 20 TABLET, DELAYED RELEASE ORAL at 18:10

## 2021-04-19 NOTE — PROGRESS NOTE ADULT - SUBJECTIVE AND OBJECTIVE BOX
Over Night Events: events noted, spiking T, hb stable    PHYSICAL EXAM    ICU Vital Signs Last 24 Hrs  T(C): 38.1 (18 Apr 2021 17:12), Max: 38.6 (18 Apr 2021 16:30)  T(F): 100.6 (18 Apr 2021 17:12), Max: 101.4 (18 Apr 2021 16:30)  HR: 89 (18 Apr 2021 23:51) (89 - 104)  BP: 108/62 (18 Apr 2021 23:51) (108/62 - 142/75)  RR: 14 (18 Apr 2021 23:51) (14 - 22)  SpO2: 100% (18 Apr 2021 23:51) (98% - 100%)      General: ill looking  HEENT: trach           Lungs: Bibasilar rhonchi  Cardiovascular: [facility] 3/6  Abdomen: Soft, Positive BS  Extremities: No clubbing   Ulcer      04-17-21 @ 07:01  -  04-18-21 @ 07:00  --------------------------------------------------------  IN:    Osmolite: 474 mL  Total IN: 474 mL    OUT:    Indwelling Catheter - Urethral (mL): 1950 mL  Total OUT: 1950 mL    Total NET: -1476 mL      04-18-21 @ 07:01  -  04-19-21 @ 06:57  --------------------------------------------------------  IN:    Osmolite: 480 mL    sodium chloride 0.45%: 770 mL  Total IN: 1250 mL    OUT:    Indwelling Catheter - Urethral (mL): 850 mL  Total OUT: 850 mL    Total NET: 400 mL          LABS:                          9.1    16.13 )-----------( 225      ( 18 Apr 2021 05:54 )             28.8                                               04-18    146  |  109  |  39<H>  ----------------------------<  184<H>  4.4   |  23  |  1.4    Ca    8.9      18 Apr 2021 05:54    TPro  5.9<L>  /  Alb  2.8<L>  /  TBili  <0.2  /  DBili  x   /  AST  26  /  ALT  44<H>  /  AlkPhos  135<H>  04-18                                                                                           LIVER FUNCTIONS - ( 18 Apr 2021 05:54 )  Alb: 2.8 g/dL / Pro: 5.9 g/dL / ALK PHOS: 135 U/L / ALT: 44 U/L / AST: 26 U/L / GGT: x                                                                                               Mode: AC/ CMV (Assist Control/ Continuous Mandatory Ventilation)  RR (machine): 14  TV (machine): 400  FiO2: 50  PEEP: 5  ITime: 1  MAP: 7  PIP: 13                                          MEDICATIONS  (STANDING):  bethanechol 10 milliGRAM(s) Oral three times a day  chlorhexidine 4% Liquid 1 Application(s) Topical <User Schedule>  heparin SubCutaneous Injection - Peds 5000 Unit(s) SubCutaneous every 8 hours  hydrALAZINE 25 milliGRAM(s) Oral four times a day  loratadine 10 milliGRAM(s) Oral daily  LORazepam     Tablet 1 milliGRAM(s) Oral two times a day  melatonin 5 milliGRAM(s) Oral at bedtime  multivitamin/minerals 1 Tablet(s) Oral daily  pantoprazole  Injectable 40 milliGRAM(s) IV Push two times a day  saccharomyces boulardii 250 milliGRAM(s) Oral two times a day  sodium chloride 0.45%. 1000 milliLiter(s) (70 mL/Hr) IV Continuous <Continuous>    MEDICATIONS  (PRN):  acetaminophen   Tablet .. 650 milliGRAM(s) Oral every 6 hours PRN Temp greater or equal to 38C (100.4F), Mild Pain (1 - 3)  LORazepam     Tablet 0.5 milliGRAM(s) Oral every 6 hours PRN Agitation

## 2021-04-19 NOTE — PROGRESS NOTE ADULT - SUBJECTIVE AND OBJECTIVE BOX
DAWN SALOMON 70y Female  MRN#: [id number]   CODE STATUS:      SUBJECTIVE  Patient is a 70y old Female who presents with a chief complaint of Blood in stool (19 Apr 2021 12:40)  Currently admitted to medicine with the primary diagnosis of Gastrointestinal bleed    No overnight events      OBJECTIVE  PAST MEDICAL & SURGICAL HISTORY  Alzheimer disease    HTN (hypertension)    Tracheostomy present    PEG (percutaneous endoscopic gastrostomy) status      ALLERGIES:  No Known Allergies    MEDICATIONS:  STANDING MEDICATIONS  bethanechol 10 milliGRAM(s) Oral three times a day  chlorhexidine 4% Liquid 1 Application(s) Topical <User Schedule>  heparin SubCutaneous Injection - Peds 5000 Unit(s) SubCutaneous every 8 hours  hydrALAZINE 25 milliGRAM(s) Oral four times a day  loratadine 10 milliGRAM(s) Oral daily  LORazepam     Tablet 1 milliGRAM(s) Oral two times a day  melatonin 5 milliGRAM(s) Oral at bedtime  multivitamin/minerals 1 Tablet(s) Oral daily  pantoprazole  Injectable 40 milliGRAM(s) IV Push two times a day  saccharomyces boulardii 250 milliGRAM(s) Oral two times a day  sodium chloride 0.45%. 1000 milliLiter(s) IV Continuous <Continuous>    PRN MEDICATIONS  acetaminophen   Tablet .. 650 milliGRAM(s) Oral every 6 hours PRN  LORazepam     Tablet 0.5 milliGRAM(s) Oral every 6 hours PRN      VITAL SIGNS: Last 24 Hours  T(C): 37.3 (19 Apr 2021 11:33), Max: 38.6 (18 Apr 2021 16:30)  T(F): 99.1 (19 Apr 2021 11:33), Max: 101.4 (18 Apr 2021 16:30)  HR: 89 (19 Apr 2021 11:33) (78 - 99)  BP: 128/76 (19 Apr 2021 11:33) (108/62 - 128/79)  BP(mean): --  RR: 22 (19 Apr 2021 11:33) (14 - 22)  SpO2: 99% (19 Apr 2021 11:33) (98% - 100%)    PHYSICAL EXAM:  GENERAL: NAD, Peg/trach, opens eyes, does not follow commands  HEENT:  Atraumatic, Normocephalic.PULMONARY: Clear   CARDIOVASCULAR: s1/s2, RRR  GASTROINTESTINAL: Soft, Nontender, Nondistended; Bowel sounds present  MUSCULOSKELETAL:  2+ Peripheral Pulses, No clubbing, cyanosis, or edema  LABS:                        9.1    16.13 )-----------( 225      ( 18 Apr 2021 05:54 )             28.8     04-18    146  |  109  |  39<H>  ----------------------------<  184<H>  4.4   |  23  |  1.4    Ca    8.9      18 Apr 2021 05:54    TPro  5.9<L>  /  Alb  2.8<L>  /  TBili  <0.2  /  DBili  x   /  AST  26  /  ALT  44<H>  /  AlkPhos  135<H>  04-18                  RADIOLOGY:    < from: CT Angio Abdomen and Pelvis w/ IV Cont (04.15.21 @ 11:24) >  IMPRESSION:    1.  Nondiagnostic study for active GI bleeding as there is dense intraluminal material throughout the colon on precontrast images, may represent previously administered oral contrast, inspissated stool or possibly blood products.  2.  Patchy bibasilarpulmonary opacities, compatible with pneumonia in the appropriate clinical setting.  3.  Multiple hypoattenuating lesions in the pancreatic body measuring up to 2.7 cm, likely cystic lesions. Recommend evaluation with outpatient contrast-enhanced MRI.  4.  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 Follow-up is recommended.    < end of copied text >

## 2021-04-19 NOTE — PROGRESS NOTE ADULT - ASSESSMENT
71 y/o F with PMHx of Alzheimer's dementia, HTN, seizure disorder, NPH s/p drainage, Acute respiratory failure secondary to pneumonia s/p PEG/trach at [facility] on 2017, recurrent UTIs sent from [facility] due to hypotension and blood in stool. As per chart her SBP was around 70/80s in nursing home. As per  she received enema 2-3 days ago at NH and after that the bleeding started. In ER VS were stable and initial hgb was 8. However Hb dropped soon after to 6.9. CRISTAL showed maroon colored stool.   Patient received 1 dose of cefepime and vancomycin each, 1L bolus of IVF and is currently receiving 2U pRBCs.  69 y/o F with PMHx of Alzheimer's dementia, HTN, seizure disorder, NPH s/p drainage, Acute respiratory failure secondary to pneumonia s/p PEG/trach at [facility] on 2017, recurrent UTIs sent from [facility] due to hypotension and blood in stool.     Impression  # UTI - s/p antibiotic treatment for 3 days  # GI bleed, GI evaluated, source most likely hemorrhoidal, Hgb stable, s/p 2 unit of PRBC   # Clogged PEG tube, fixed by GI, now working    MEDICATIONS  (STANDING):  bethanechol 10 milliGRAM(s) Oral three times a day  chlorhexidine 4% Liquid 1 Application(s) Topical <User Schedule>  heparin SubCutaneous Injection - Peds 5000 Unit(s) SubCutaneous every 8 hours  hydrALAZINE 25 milliGRAM(s) Oral four times a day  loratadine 10 milliGRAM(s) Oral daily  LORazepam  Tablet 1 milliGRAM(s) Oral two times a day  # insomnia  - melatonin 5 milliGRAM(s) Oral at bedtime    pantoprazole  Injectable 40 milliGRAM(s) IV Push two times a day  saccharomyces boulardii 250 milliGRAM(s) Oral two times a day  sodium chloride 0.45%. 1000 milliLiter(s) (70 mL/Hr) IV Continuous <Continuous>    MEDICATIONS  (PRN):  acetaminophen   Tablet .. 650 milliGRAM(s) Oral every 6 hours PRN Temp greater or equal to 38C (100.4F), Mild Pain (1 - 3)  LORazepam     Tablet 0.5 milliGRAM(s) Oral every 6 hours PRN Agitation              # GIB as per GI probably due to hemorrhoids, now stable not bleeding  # peg clogged, changed by GI team, now working  # dementia , sp trach, HTN cont current meds and care,   # UTI< leukocytosis, positive UA , multiple organisms growing, on radha, ID eval,  71 y/o F with PMHx of Alzheimer's dementia, HTN, seizure disorder, NPH s/p drainage, Acute respiratory failure secondary to pneumonia s/p PEG/trach at [facility] on 2017, recurrent UTIs sent from [facility] due to hypotension and blood in stool.     Impression  # UTI - s/p antibiotic treatment for 3 days  # GI bleed, GI evaluated, source most likely hemorrhoidal, Hgb stable, s/p 2 unit of PRBC   # Clogged PEG tube, fixed by GI, now working  # urinary retention/Neurogenic bladder? - bethanechol 10 milliGRAM(s) Oral three times a day  # Alzhiemer's Dementia/seizure disorder/NPH - Peg/trach  # HTN - hydrALAZINE 25 milliGRAM(s) Oral four times a day  # Allergy - loratadine 10 milliGRAM(s) Oral daily  # Agitation/anxiety - LORazepam  Tablet 1 milliGRAM(s) Oral two times a day  # insomnia - melatonin 5 milliGRAM(s) Oral at bedtime    # Pending: Covid swab x2, back to nursing home     #) MISC  Activity: trach/peg  DVT ppx: Hep  GI ppx: protonix  Diet: feeding through peg tube  Code: Full  Dispo: medical optimization  CHG wash       71 y/o F with PMHx of Alzheimer's dementia, HTN, seizure disorder, NPH s/p drainage, Acute respiratory failure secondary to pneumonia s/p PEG/trach at [facility] on 2017, recurrent UTIs sent from [facility] due to hypotension and blood in stool.     Impression  # UTI - s/p antibiotic treatment for 3 days  # GI bleed, GI evaluated, source most likely hemorrhoidal, Hgb stable, s/p 2 unit of PRBC   # Clogged PEG tube, fixed by GI, now working  # urinary retention/Neurogenic bladder? - bethanechol 10 milliGRAM(s) Oral three times a day  # Alzhiemer's Dementia/seizure disorder/NPH - Peg/trach  # HTN - hydrALAZINE 25 milliGRAM(s) Oral four times a day  # Allergy - loratadine 10 milliGRAM(s) Oral daily  # Agitation/anxiety - LORazepam  Tablet 1 milliGRAM(s) Oral two times a day  # insomnia - melatonin 5 milliGRAM(s) Oral at bedtime    # Pending: Covid swab x2, back to nursing home     #) MISC  Activity: trach/peg  DVT ppx: Hep  GI ppx: protonix  Diet: feeding through peg tube  Code: DNR/DNI  Dispo: medical optimization  CHG wash

## 2021-04-19 NOTE — PROGRESS NOTE ADULT - ASSESSMENT
IMPRESSION:    Acute blood loss/ anemia stable  GI bleed sp tx  History of advanced dementia  PEG/Trach  aspiration pneumonia ( increase wbc/ fever)  Renal failure improving    PLAN:    CNS: Avoid sedation    HEENT: Oral care    PULMONARY:  HOB @ 45 degrees. Aspiration precautions, DTA, keep Sao2 88 to 94%    CARDIOVASCULAR: keep equal    GI: GI prophylaxis.  GI fup, protonix q 12h. feeding    RENAL:  Follow up lytes.  Correct as needed,    INFECTIOUS DISEASE: Follow up cultures, meropenem finisH course    HEMATOLOGICAL:  DVT prophylaxis     ENDOCRINE:  Follow up FS.  Insulin protocol if needed.    MUSCULOSKELETAL: Bedrest  poor prognosis    DNR

## 2021-04-20 LAB
ALBUMIN SERPL ELPH-MCNC: 2.7 G/DL — LOW (ref 3.5–5.2)
ALP SERPL-CCNC: 98 U/L — SIGNIFICANT CHANGE UP (ref 30–115)
ALT FLD-CCNC: 34 U/L — SIGNIFICANT CHANGE UP (ref 0–41)
ANION GAP SERPL CALC-SCNC: 9 MMOL/L — SIGNIFICANT CHANGE UP (ref 7–14)
APPEARANCE UR: CLEAR — SIGNIFICANT CHANGE UP
AST SERPL-CCNC: 24 U/L — SIGNIFICANT CHANGE UP (ref 0–41)
BACTERIA # UR AUTO: NEGATIVE — SIGNIFICANT CHANGE UP
BASOPHILS # BLD AUTO: 0.03 K/UL — SIGNIFICANT CHANGE UP (ref 0–0.2)
BASOPHILS NFR BLD AUTO: 0.3 % — SIGNIFICANT CHANGE UP (ref 0–1)
BILIRUB SERPL-MCNC: 0.2 MG/DL — SIGNIFICANT CHANGE UP (ref 0.2–1.2)
BILIRUB UR-MCNC: NEGATIVE — SIGNIFICANT CHANGE UP
BUN SERPL-MCNC: 23 MG/DL — HIGH (ref 10–20)
CALCIUM SERPL-MCNC: 8.8 MG/DL — SIGNIFICANT CHANGE UP (ref 8.5–10.1)
CHLORIDE SERPL-SCNC: 111 MMOL/L — HIGH (ref 98–110)
CO2 SERPL-SCNC: 25 MMOL/L — SIGNIFICANT CHANGE UP (ref 17–32)
COLOR SPEC: YELLOW — SIGNIFICANT CHANGE UP
CREAT SERPL-MCNC: 0.8 MG/DL — SIGNIFICANT CHANGE UP (ref 0.7–1.5)
CULTURE RESULTS: SIGNIFICANT CHANGE UP
CULTURE RESULTS: SIGNIFICANT CHANGE UP
DIFF PNL FLD: SIGNIFICANT CHANGE UP
EOSINOPHIL # BLD AUTO: 0.45 K/UL — SIGNIFICANT CHANGE UP (ref 0–0.7)
EOSINOPHIL NFR BLD AUTO: 4.7 % — SIGNIFICANT CHANGE UP (ref 0–8)
EPI CELLS # UR: 4 /HPF — SIGNIFICANT CHANGE UP (ref 0–5)
GLUCOSE SERPL-MCNC: 109 MG/DL — HIGH (ref 70–99)
GLUCOSE UR QL: NEGATIVE — SIGNIFICANT CHANGE UP
HCT VFR BLD CALC: 25.7 % — LOW (ref 37–47)
HGB BLD-MCNC: 8.1 G/DL — LOW (ref 12–16)
HYALINE CASTS # UR AUTO: 4 /LPF — SIGNIFICANT CHANGE UP (ref 0–7)
IMM GRANULOCYTES NFR BLD AUTO: 2.1 % — HIGH (ref 0.1–0.3)
KETONES UR-MCNC: NEGATIVE — SIGNIFICANT CHANGE UP
LEUKOCYTE ESTERASE UR-ACNC: NEGATIVE — SIGNIFICANT CHANGE UP
LYMPHOCYTES # BLD AUTO: 1.58 K/UL — SIGNIFICANT CHANGE UP (ref 1.2–3.4)
LYMPHOCYTES # BLD AUTO: 16.4 % — LOW (ref 20.5–51.1)
MCHC RBC-ENTMCNC: 29.9 PG — SIGNIFICANT CHANGE UP (ref 27–31)
MCHC RBC-ENTMCNC: 31.5 G/DL — LOW (ref 32–37)
MCV RBC AUTO: 94.8 FL — SIGNIFICANT CHANGE UP (ref 81–99)
MONOCYTES # BLD AUTO: 0.69 K/UL — HIGH (ref 0.1–0.6)
MONOCYTES NFR BLD AUTO: 7.2 % — SIGNIFICANT CHANGE UP (ref 1.7–9.3)
NEUTROPHILS # BLD AUTO: 6.69 K/UL — HIGH (ref 1.4–6.5)
NEUTROPHILS NFR BLD AUTO: 69.3 % — SIGNIFICANT CHANGE UP (ref 42.2–75.2)
NITRITE UR-MCNC: NEGATIVE — SIGNIFICANT CHANGE UP
NRBC # BLD: 0 /100 WBCS — SIGNIFICANT CHANGE UP (ref 0–0)
PH UR: 7.5 — SIGNIFICANT CHANGE UP (ref 5–8)
PLATELET # BLD AUTO: 187 K/UL — SIGNIFICANT CHANGE UP (ref 130–400)
POTASSIUM SERPL-MCNC: 4.5 MMOL/L — SIGNIFICANT CHANGE UP (ref 3.5–5)
POTASSIUM SERPL-SCNC: 4.5 MMOL/L — SIGNIFICANT CHANGE UP (ref 3.5–5)
PROT SERPL-MCNC: 5.8 G/DL — LOW (ref 6–8)
PROT UR-MCNC: ABNORMAL
RBC # BLD: 2.71 M/UL — LOW (ref 4.2–5.4)
RBC # FLD: 13.6 % — SIGNIFICANT CHANGE UP (ref 11.5–14.5)
RBC CASTS # UR COMP ASSIST: 20 /HPF — HIGH (ref 0–4)
SODIUM SERPL-SCNC: 145 MMOL/L — SIGNIFICANT CHANGE UP (ref 135–146)
SP GR SPEC: 1.02 — SIGNIFICANT CHANGE UP (ref 1.01–1.03)
SPECIMEN SOURCE: SIGNIFICANT CHANGE UP
SPECIMEN SOURCE: SIGNIFICANT CHANGE UP
UROBILINOGEN FLD QL: SIGNIFICANT CHANGE UP
WBC # BLD: 9.64 K/UL — SIGNIFICANT CHANGE UP (ref 4.8–10.8)
WBC # FLD AUTO: 9.64 K/UL — SIGNIFICANT CHANGE UP (ref 4.8–10.8)
WBC UR QL: 5 /HPF — SIGNIFICANT CHANGE UP (ref 0–5)

## 2021-04-20 PROCEDURE — 99233 SBSQ HOSP IP/OBS HIGH 50: CPT

## 2021-04-20 RX ADMIN — Medication 5 MILLIGRAM(S): at 01:38

## 2021-04-20 RX ADMIN — Medication 25 MILLIGRAM(S): at 23:53

## 2021-04-20 RX ADMIN — HEPARIN SODIUM 5000 UNIT(S): 5000 INJECTION INTRAVENOUS; SUBCUTANEOUS at 13:43

## 2021-04-20 RX ADMIN — Medication 250 MILLIGRAM(S): at 17:46

## 2021-04-20 RX ADMIN — Medication 250 MILLIGRAM(S): at 06:34

## 2021-04-20 RX ADMIN — HEPARIN SODIUM 5000 UNIT(S): 5000 INJECTION INTRAVENOUS; SUBCUTANEOUS at 06:34

## 2021-04-20 RX ADMIN — Medication 25 MILLIGRAM(S): at 17:46

## 2021-04-20 RX ADMIN — Medication 5 MILLIGRAM(S): at 21:50

## 2021-04-20 RX ADMIN — LORATADINE 10 MILLIGRAM(S): 10 TABLET ORAL at 12:28

## 2021-04-20 RX ADMIN — Medication 25 MILLIGRAM(S): at 12:27

## 2021-04-20 RX ADMIN — PANTOPRAZOLE SODIUM 40 MILLIGRAM(S): 20 TABLET, DELAYED RELEASE ORAL at 06:34

## 2021-04-20 RX ADMIN — PANTOPRAZOLE SODIUM 40 MILLIGRAM(S): 20 TABLET, DELAYED RELEASE ORAL at 17:46

## 2021-04-20 RX ADMIN — Medication 10 MILLIGRAM(S): at 06:34

## 2021-04-20 RX ADMIN — Medication 25 MILLIGRAM(S): at 01:38

## 2021-04-20 RX ADMIN — Medication 10 MILLIGRAM(S): at 13:43

## 2021-04-20 RX ADMIN — HEPARIN SODIUM 5000 UNIT(S): 5000 INJECTION INTRAVENOUS; SUBCUTANEOUS at 01:38

## 2021-04-20 RX ADMIN — Medication 650 MILLIGRAM(S): at 08:45

## 2021-04-20 RX ADMIN — Medication 650 MILLIGRAM(S): at 09:15

## 2021-04-20 RX ADMIN — CHLORHEXIDINE GLUCONATE 1 APPLICATION(S): 213 SOLUTION TOPICAL at 06:34

## 2021-04-20 RX ADMIN — Medication 10 MILLIGRAM(S): at 22:14

## 2021-04-20 RX ADMIN — Medication 1 MILLIGRAM(S): at 06:35

## 2021-04-20 RX ADMIN — Medication 10 MILLIGRAM(S): at 01:38

## 2021-04-20 RX ADMIN — HEPARIN SODIUM 5000 UNIT(S): 5000 INJECTION INTRAVENOUS; SUBCUTANEOUS at 21:50

## 2021-04-20 RX ADMIN — Medication 1 MILLIGRAM(S): at 17:50

## 2021-04-20 RX ADMIN — Medication 1 TABLET(S): at 12:28

## 2021-04-20 RX ADMIN — Medication 25 MILLIGRAM(S): at 06:34

## 2021-04-20 NOTE — PROGRESS NOTE ADULT - ASSESSMENT
69 y/o F with PMHx of Alzheimer's dementia, HTN, seizure disorder, NPH s/p drainage, Acute respiratory failure secondary to pneumonia s/p PEG/trach at [facility] on 2017, recurrent UTIs sent from [facility] due to hypotension and blood in stool.     Impression  # UTI - s/p antibiotic treatment for 3 days  # GI bleed, GI evaluated, source most likely hemorrhoidal, Hgb stable, s/p 2 unit of PRBC   # Clogged PEG tube, fixed by GI, now working  # urinary retention/Neurogenic bladder? - bethanechol 10 milliGRAM(s) Oral three times a day  # Alzhiemer's Dementia/seizure disorder/NPH - Peg/trach  # HTN - hydrALAZINE 25 milliGRAM(s) Oral four times a day  # Allergy - loratadine 10 milliGRAM(s) Oral daily  # Agitation/anxiety - LORazepam  Tablet 1 milliGRAM(s) Oral two times a day  # insomnia - melatonin 5 milliGRAM(s) Oral at bedtime    # Pending: patient spiked fever overnight: T: 100.4, tachycardic, -sent UA/blood culture, will observe patient for recurrence  - fu repeat labs, holding off antibiotics at the moment    #) MISC  Activity: trach/peg  DVT ppx: Heparin  GI ppx: protonix  Diet: feeding through peg tube  Code: DNR/DNI  Dispo: medical optimization  CHG wash

## 2021-04-20 NOTE — PROGRESS NOTE ADULT - SUBJECTIVE AND OBJECTIVE BOX
DAWN SALOMON 70y Female  MRN#: [id number]   CODE STATUS:      SUBJECTIVE  Patient is a 70y old Female who presents with a chief complaint of Blood in stool (19 Apr 2021 12:40)  Currently admitted to medicine with the primary diagnosis of Gastrointestinal bleed    patient spiked fever, and became tachycardic, holding discharge to rule out any infectious etiology      OBJECTIVE  PAST MEDICAL & SURGICAL HISTORY  Alzheimer disease    HTN (hypertension)    Tracheostomy present    PEG (percutaneous endoscopic gastrostomy) status      ALLERGIES:  No Known Allergies    MEDICATIONS:  MEDICATIONS  (STANDING):  bethanechol 10 milliGRAM(s) Oral three times a day  chlorhexidine 4% Liquid 1 Application(s) Topical <User Schedule>  heparin SubCutaneous Injection - Peds 5000 Unit(s) SubCutaneous every 8 hours  hydrALAZINE 25 milliGRAM(s) Oral four times a day  loratadine 10 milliGRAM(s) Oral daily  LORazepam     Tablet 1 milliGRAM(s) Oral two times a day  melatonin 5 milliGRAM(s) Oral at bedtime  multivitamin/minerals 1 Tablet(s) Oral daily  pantoprazole  Injectable 40 milliGRAM(s) IV Push two times a day  saccharomyces boulardii 250 milliGRAM(s) Oral two times a day  sodium chloride 0.45%. 1000 milliLiter(s) (70 mL/Hr) IV Continuous <Continuous>    MEDICATIONS  (PRN):  acetaminophen   Tablet .. 650 milliGRAM(s) Oral every 6 hours PRN Temp greater or equal to 38C (100.4F), Mild Pain (1 - 3)  LORazepam     Tablet 0.5 milliGRAM(s) Oral every 6 hours PRN Agitation      VITAL SIGNS: Last 24 Hours  Vital Signs Last 24 Hrs  T(C): 36.6 (20 Apr 2021 12:21), Max: 38.1 (19 Apr 2021 17:32)  T(F): 97.9 (20 Apr 2021 12:21), Max: 100.5 (19 Apr 2021 17:32)  HR: 88 (20 Apr 2021 12:21) (67 - 103)  BP: 158/76 (20 Apr 2021 12:21) (124/71 - 158/76)  BP(mean): 107 (20 Apr 2021 12:21) (91 - 107)  RR: 17 (20 Apr 2021 12:21) (17 - 24)  SpO2: 100% (20 Apr 2021 12:21) (99% - 100%)    PHYSICAL EXAM:  GENERAL: NAD, Peg/trach, opens eyes, does not follow commands  HEENT:  Atraumatic, Normocephalic.  PULMONARY: Clear   CARDIOVASCULAR: s1/s2, RRR  GASTROINTESTINAL: Soft, Nontender, Nondistended; Bowel sounds present  MUSCULOSKELETAL:  2+ Peripheral Pulses, No clubbing, cyanosis, or edema    LABS:                          8.1    9.64  )-----------( 187      ( 20 Apr 2021 05:25 )             25.7                           9.1    16.13 )-----------( 225      ( 18 Apr 2021 05:54 )             28.8     04-20    145  |  111<H>  |  23<H>  ----------------------------<  109<H>  4.5   |  25  |  0.8    Ca    8.8      20 Apr 2021 05:25    TPro  5.8<L>  /  Alb  2.7<L>  /  TBili  0.2  /  DBili  x   /  AST  24  /  ALT  34  /  AlkPhos  98  04-20      04-18    146  |  109  |  39<H>  ----------------------------<  184<H>  4.4   |  23  |  1.4    Ca    8.9      18 Apr 2021 05:54    TPro  5.9<L>  /  Alb  2.8<L>  /  TBili  <0.2  /  DBili  x   /  AST  26  /  ALT  44<H>  /  AlkPhos  135<H>  04-18                  RADIOLOGY:    < from: CT Angio Abdomen and Pelvis w/ IV Cont (04.15.21 @ 11:24) >  IMPRESSION:    1.  Nondiagnostic study for active GI bleeding as there is dense intraluminal material throughout the colon on precontrast images, may represent previously administered oral contrast, inspissated stool or possibly blood products.  2.  Patchy bibasilarpulmonary opacities, compatible with pneumonia in the appropriate clinical setting.  3.  Multiple hypoattenuating lesions in the pancreatic body measuring up to 2.7 cm, likely cystic lesions. Recommend evaluation with outpatient contrast-enhanced MRI.  4.  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 Follow-up is recommended.    < end of copied text >

## 2021-04-20 NOTE — PROGRESS NOTE ADULT - ATTENDING COMMENTS
I have personally seen and examined this patient.  I have fully participated in the care of this patient.  I have reviewed pertinent clinical information, including history, physical exam, plan and note.   I have reviewed relevant imaging and diagnostic studies personally. I agree with resident note above and plan of care, edited and corrected where applicable.     Please refer to today's Attending H&P attestation for full A/P.   Patient s/p overnight PRBC transfusion, the plan is for CBC monitoring (recent bright red blood per rectum)   DC planning with outpatient Follow up with GI if CBC stable.   In case of recurrence of bleeding or significant decrease in HB, will need GI input re: inpatient c-scopy    Case discussed with resident assigned.
Ms Cervantes is a 69 yo woman with medical history of Alzheimer's dementia, HTN, seizure disorder, NPH s/p drainage, Acute respiratory failure secondary to pneumonia s/p PEG/trach at [email or web address] on 2017, recurrent UTIs sent from [facility] due to hypotension and blood in stool.       #UTI   s/p antibiotic treatment for 3 days    #GI bleed  GI evaluated, source most likely hemorrhoidal  Hgb stable, s/p 2 unit of PRBC     #Clogged PEG tube  fixed by GI, now working    #urinary retention/Neurogenic bladder?   bethanechol 10 milliGRAM(s) Oral three times a day    #Alzhiemer's Dementia/seizure disorder/NPH   Peg/trach    #HTN   hydrALAZINE 25 milliGRAM(s) Oral four times a day    #Allergy   loratadine 10 milliGRAM(s) Oral daily    #Agitation/anxiety   LORazepam  Tablet 1 milliGRAM(s) Oral two times a day    #insomnia   melatonin 5 milliGRAM(s) Oral at bedtime    DVT ppx: Heparin  GI ppx: protonix  Diet: feeding through peg tube  Code: DNR/DNI    Progress Note Handoff:   Pending: patient spiked fever overnight: T: 100.4, tachycardic, -sent UA/blood culture, will observe patient for recurrence  - fu repeat labs, holding off antibiotics at the moment  Dispo: medical optimization
Ms Cervantes is a 69 yo woman with medical history of Alzheimer's dementia, HTN, seizure disorder, NPH s/p drainage, Acute respiratory failure secondary to pneumonia s/p PEG/trach at [facility] on 2017, recurrent UTIs sent from [facility] due to hypotension and blood in stool.     #UTI    s/p antibiotic treatment for 3 days    #GI bleed  GI evaluated, source most likely hemorrhoidal, Hgb stable, s/p 2 unit of PRBC     #Clogged PEG tube   fixed by GI, now working    #urinary retention/Neurogenic bladder?   bethanechol 10 milliGRAM(s) Oral three times a day    #Alzhiemer's Dementia/seizure disorder/NPH   Peg/trach    #HTN   hydrALAZINE 25 milliGRAM(s) Oral four times a day    #Allergy   loratadine 10 milliGRAM(s) Oral daily    #Agitation/anxiety   LORazepam  Tablet 1 milliGRAM(s) Oral two times a day    #insomnia   melatonin 5 milliGRAM(s) Oral at bedtime    Pending: Covid swab x2, back to nursing home       DVT ppx: Hep  Diet: feeding through peg tube  Code: DNR/DNI  Dispo: medical optimization  CHG wash    Progress Note Handoff:  pending Covid swab   Dispo: dc planning
I edited the note

## 2021-04-21 LAB
ALBUMIN SERPL ELPH-MCNC: 2.7 G/DL — LOW (ref 3.5–5.2)
ALP SERPL-CCNC: 95 U/L — SIGNIFICANT CHANGE UP (ref 30–115)
ALT FLD-CCNC: 28 U/L — SIGNIFICANT CHANGE UP (ref 0–41)
ANION GAP SERPL CALC-SCNC: 10 MMOL/L — SIGNIFICANT CHANGE UP (ref 7–14)
AST SERPL-CCNC: 22 U/L — SIGNIFICANT CHANGE UP (ref 0–41)
BASOPHILS # BLD AUTO: 0.04 K/UL — SIGNIFICANT CHANGE UP (ref 0–0.2)
BASOPHILS NFR BLD AUTO: 0.5 % — SIGNIFICANT CHANGE UP (ref 0–1)
BILIRUB SERPL-MCNC: <0.2 MG/DL — SIGNIFICANT CHANGE UP (ref 0.2–1.2)
BUN SERPL-MCNC: 18 MG/DL — SIGNIFICANT CHANGE UP (ref 10–20)
CALCIUM SERPL-MCNC: 8.6 MG/DL — SIGNIFICANT CHANGE UP (ref 8.5–10.1)
CHLORIDE SERPL-SCNC: 105 MMOL/L — SIGNIFICANT CHANGE UP (ref 98–110)
CO2 SERPL-SCNC: 22 MMOL/L — SIGNIFICANT CHANGE UP (ref 17–32)
CREAT SERPL-MCNC: 0.6 MG/DL — LOW (ref 0.7–1.5)
EOSINOPHIL # BLD AUTO: 0.35 K/UL — SIGNIFICANT CHANGE UP (ref 0–0.7)
EOSINOPHIL NFR BLD AUTO: 3.9 % — SIGNIFICANT CHANGE UP (ref 0–8)
GLUCOSE SERPL-MCNC: 158 MG/DL — HIGH (ref 70–99)
HCT VFR BLD CALC: 24.9 % — LOW (ref 37–47)
HGB BLD-MCNC: 8 G/DL — LOW (ref 12–16)
IMM GRANULOCYTES NFR BLD AUTO: 1.6 % — HIGH (ref 0.1–0.3)
LYMPHOCYTES # BLD AUTO: 1.44 K/UL — SIGNIFICANT CHANGE UP (ref 1.2–3.4)
LYMPHOCYTES # BLD AUTO: 16.2 % — LOW (ref 20.5–51.1)
MCHC RBC-ENTMCNC: 29.7 PG — SIGNIFICANT CHANGE UP (ref 27–31)
MCHC RBC-ENTMCNC: 32.1 G/DL — SIGNIFICANT CHANGE UP (ref 32–37)
MCV RBC AUTO: 92.6 FL — SIGNIFICANT CHANGE UP (ref 81–99)
MONOCYTES # BLD AUTO: 0.61 K/UL — HIGH (ref 0.1–0.6)
MONOCYTES NFR BLD AUTO: 6.9 % — SIGNIFICANT CHANGE UP (ref 1.7–9.3)
NEUTROPHILS # BLD AUTO: 6.29 K/UL — SIGNIFICANT CHANGE UP (ref 1.4–6.5)
NEUTROPHILS NFR BLD AUTO: 70.9 % — SIGNIFICANT CHANGE UP (ref 42.2–75.2)
NRBC # BLD: 0 /100 WBCS — SIGNIFICANT CHANGE UP (ref 0–0)
PLATELET # BLD AUTO: 218 K/UL — SIGNIFICANT CHANGE UP (ref 130–400)
POTASSIUM SERPL-MCNC: 4.2 MMOL/L — SIGNIFICANT CHANGE UP (ref 3.5–5)
POTASSIUM SERPL-SCNC: 4.2 MMOL/L — SIGNIFICANT CHANGE UP (ref 3.5–5)
PROT SERPL-MCNC: 5.7 G/DL — LOW (ref 6–8)
RBC # BLD: 2.69 M/UL — LOW (ref 4.2–5.4)
RBC # FLD: 13.2 % — SIGNIFICANT CHANGE UP (ref 11.5–14.5)
SODIUM SERPL-SCNC: 137 MMOL/L — SIGNIFICANT CHANGE UP (ref 135–146)
WBC # BLD: 8.87 K/UL — SIGNIFICANT CHANGE UP (ref 4.8–10.8)
WBC # FLD AUTO: 8.87 K/UL — SIGNIFICANT CHANGE UP (ref 4.8–10.8)

## 2021-04-21 PROCEDURE — 99232 SBSQ HOSP IP/OBS MODERATE 35: CPT

## 2021-04-21 PROCEDURE — 71045 X-RAY EXAM CHEST 1 VIEW: CPT | Mod: 26

## 2021-04-21 PROCEDURE — 99233 SBSQ HOSP IP/OBS HIGH 50: CPT

## 2021-04-21 RX ADMIN — Medication 1 MILLIGRAM(S): at 17:34

## 2021-04-21 RX ADMIN — Medication 5 MILLIGRAM(S): at 21:13

## 2021-04-21 RX ADMIN — Medication 10 MILLIGRAM(S): at 21:14

## 2021-04-21 RX ADMIN — Medication 10 MILLIGRAM(S): at 05:03

## 2021-04-21 RX ADMIN — Medication 25 MILLIGRAM(S): at 17:27

## 2021-04-21 RX ADMIN — Medication 250 MILLIGRAM(S): at 17:27

## 2021-04-21 RX ADMIN — CHLORHEXIDINE GLUCONATE 1 APPLICATION(S): 213 SOLUTION TOPICAL at 05:04

## 2021-04-21 RX ADMIN — HEPARIN SODIUM 5000 UNIT(S): 5000 INJECTION INTRAVENOUS; SUBCUTANEOUS at 15:04

## 2021-04-21 RX ADMIN — PANTOPRAZOLE SODIUM 40 MILLIGRAM(S): 20 TABLET, DELAYED RELEASE ORAL at 17:27

## 2021-04-21 RX ADMIN — HEPARIN SODIUM 5000 UNIT(S): 5000 INJECTION INTRAVENOUS; SUBCUTANEOUS at 05:03

## 2021-04-21 RX ADMIN — SODIUM CHLORIDE 70 MILLILITER(S): 9 INJECTION, SOLUTION INTRAVENOUS at 05:05

## 2021-04-21 RX ADMIN — Medication 1 TABLET(S): at 11:46

## 2021-04-21 RX ADMIN — Medication 1 MILLIGRAM(S): at 05:18

## 2021-04-21 RX ADMIN — Medication 10 MILLIGRAM(S): at 15:04

## 2021-04-21 RX ADMIN — HEPARIN SODIUM 5000 UNIT(S): 5000 INJECTION INTRAVENOUS; SUBCUTANEOUS at 21:13

## 2021-04-21 RX ADMIN — Medication 25 MILLIGRAM(S): at 11:46

## 2021-04-21 RX ADMIN — PANTOPRAZOLE SODIUM 40 MILLIGRAM(S): 20 TABLET, DELAYED RELEASE ORAL at 05:03

## 2021-04-21 RX ADMIN — Medication 25 MILLIGRAM(S): at 05:03

## 2021-04-21 RX ADMIN — LORATADINE 10 MILLIGRAM(S): 10 TABLET ORAL at 11:46

## 2021-04-21 NOTE — DIETITIAN INITIAL EVALUATION ADULT. - OTHER INFO
Pt admitted d/t GI bleed, TYESHA and anemia. GI bleed noted most likely hemorrhoidal; Hgb stable sp 2 PRBC. Clogged PEG tube on admission- fixed by GI; now working. Pt noted to spike fever overnight, UA/blood cultures sent--pending.

## 2021-04-21 NOTE — DISCHARGE NOTE PROVIDER - NSDCMRMEDTOKEN_GEN_ALL_CORE_FT
Aspirin Enteric Coated 81 mg oral delayed release tablet: 1 tab(s) orally once a day  Ativan 0.5 mg oral tablet: 1 tab(s) orally every 6 hours, As Needed for agitation  Ativan 1 mg oral tablet: orally 2 times a day  bethanechol 10 mg oral tablet: 1 tab(s) orally 3 times a day  Colace 100 mg oral capsule: 1 cap(s) orally 2 times a day  Florastor 250 mg oral capsule: 1 cap(s) orally 2 times a day  gabapentin 300 mg oral capsule: 1 cap(s) orally once a day  gabapentin 400 mg oral capsule: 1 cap(s) orally once a day  hydrALAZINE 25 mg oral tablet: 1 tab(s) orally 4 times a day  loratadine 10 mg oral tablet: 1 tab(s) orally once a day  Melatonin 5 mg oral tablet: 1 tab(s) orally once a day (at bedtime)  Multiple Vitamins with Minerals oral tablet: 1 tab(s) orally once a day  Pepcid 20 mg oral tablet: 0.5 tab(s) orally once a day  Senna 8.6 mg oral tablet: 1 tab(s) orally once a day (at bedtime)  tiZANidine 4 mg oral capsule: 1 cap(s) orally 2 times a day  Tylenol 325 mg oral capsule: 1 cap(s) orally 3 times a day

## 2021-04-21 NOTE — CHART NOTE - NSCHARTNOTEFT_GEN_A_CORE
<<<RESIDENT DISCHARGE NOTE>>>     DAWN SALOMON  MRN-[id number]    VITAL SIGNS:  T(F): 98.6 (04-21-21 @ 08:00), Max: 98.6 (04-21-21 @ 08:00)  HR: 94 (04-21-21 @ 08:00)  BP: 124/75 (04-21-21 @ 08:00)  SpO2: 100% (04-21-21 @ 08:00)  Weight (kg): 93.9 (04-21-21 @ 08:13)    PHYSICAL EXAM:  GENERAL: NAD, lying in bed comfortably  HEAD:  Atraumatic, Normocephalic  EYES: EOMI, PERRLA, conjunctiva and sclera clear  ENT: Moist mucous membranes  NECK: Supple, No JVD  CHEST/LUNG: Clear to auscultation bilaterally; No rales, rhonchi, wheezing, or rubs. Unlabored respirations  HEART: Regular rate and rhythm; No murmurs, rubs, or gallops  ABDOMEN: Bowel sounds present; Soft, Nontender, Nondistended. No hepatomegally  EXTREMITIES:  2+ Peripheral Pulses, brisk capillary refill. No clubbing, cyanosis, or edema  NERVOUS SYSTEM:  Alert & Oriented X3, speech clear. No deficits   MSK: FROM all 4 extremities, full and equal strength  SKIN: No rashes or lesions  TEST RESULTS:                        8.0    8.87  )-----------( 218      ( 21 Apr 2021 08:10 )             24.9       04-21    137  |  105  |  18  ----------------------------<  158<H>  4.2   |  22  |  0.6<L>    Ca    8.6      21 Apr 2021 08:10    TPro  5.7<L>  /  Alb  2.7<L>  /  TBili  <0.2  /  DBili  x   /  AST  22  /  ALT  28  /  AlkPhos  95  04-21          DISPOSITION:   [ *  ] Home,    [  ] Home with Visiting Nursing Services,   [    ]  SNF/ NH,    [   ] Acute Rehab (4A),   [   ] Other (Specify:_________) <<<RESIDENT DISCHARGE NOTE>>>     DAWN SALOMON  MRN-[id number]    VITAL SIGNS:  T(F): 98.6 (04-21-21 @ 08:00), Max: 98.6 (04-21-21 @ 08:00)  HR: 94 (04-21-21 @ 08:00)  BP: 124/75 (04-21-21 @ 08:00)  SpO2: 100% (04-21-21 @ 08:00)  Weight (kg): 93.9 (04-21-21 @ 08:13)    PHYSICAL EXAM:  GENERAL: NAD, lying in bed comfortably  HEAD:  Atraumatic, Normocephalic  EYES: EOMI, PERRLA, conjunctiva and sclera clear  ENT: Moist mucous membranes  NECK: Supple, No JVD  CHEST/LUNG: Clear to auscultation bilaterally; No rales, rhonchi, wheezing, or rubs. Unlabored respirations  HEART: Regular rate and rhythm; No murmurs, rubs, or gallops  ABDOMEN: Bowel sounds present; Soft, Nontender, Nondistended. No hepatomegally  EXTREMITIES:  2+ Peripheral Pulses, brisk capillary refill. No clubbing, cyanosis, or edema  NERVOUS SYSTEM:  Alert & Oriented X3, speech clear. No deficits   MSK: FROM all 4 extremities, full and equal strength  SKIN: No rashes or lesions  TEST RESULTS:                        8.0    8.87  )-----------( 218      ( 21 Apr 2021 08:10 )             24.9       04-21    137  |  105  |  18  ----------------------------<  158<H>  4.2   |  22  |  0.6<L>    Ca    8.6      21 Apr 2021 08:10    TPro  5.7<L>  /  Alb  2.7<L>  /  TBili  <0.2  /  DBili  x   /  AST  22  /  ALT  28  /  AlkPhos  95  04-21          DISPOSITION:   [   ] Home,    [  ] Home with Visiting Nursing Services,   [  *  ]  SNF/ NH,    [   ] Acute Rehab (4A),   [   ] Other (Specify:_________)

## 2021-04-21 NOTE — DIETITIAN INITIAL EVALUATION ADULT. - ENTERAL
Adjust EN to the following regimen as medically feasible Jevity 1.2 total volume 1200 mL//d. Bolus rate of 300 mL q6h. This will provide: 1440 kcal, 67 g protein and 972 mL free H2O. Additional fluids per LIP.

## 2021-04-21 NOTE — DISCHARGE NOTE PROVIDER - NSDCCPCAREPLAN_GEN_ALL_CORE_FT
PRINCIPAL DISCHARGE DIAGNOSIS  Diagnosis: Gastrointestinal bleed  Assessment and Plan of Treatment: You came in with GI bleed. We held your aspirin and contacted GI doctors. Your hemoglobin was sable and you were found to have bleeding seocndary to hemorrhoids. Please take laxative and high fiber diet. Follow up with your PMD as out patient      SECONDARY DISCHARGE DIAGNOSES  Diagnosis: TYESHA (acute kidney injury)  Assessment and Plan of Treatment: You were noted to have a temporary insult to your kidney function either at the time that you arrived at the hospital or during your stay here. We have monitored your kidney function with blood work during your time here and you are at a level that no longer requires continued hospital level care. We do recommend that you follow up to continually have your kidney function checked. You can follow up with your primary care doctor, or, if recommended in the discharge paperwork, you should follow up with a kidney specialist called a nephrologist.

## 2021-04-21 NOTE — DISCHARGE NOTE PROVIDER - CARE PROVIDER_API CALL
Ricci Castillo ()  Internal Medicine  [address] [place] [address]  Phone: [phone]  Fax: [phone]  Follow Up Time:

## 2021-04-21 NOTE — DIETITIAN INITIAL EVALUATION ADULT. - REASON INDICATOR FOR ASSESSMENT
EN pta; assessment overdue-- new to unit from ER HOLD which is screened on a consult only basis. Pt is non-verbal + trach/vent dependent therefore unable to provide hx. No response from pt , Zak, @ [phone]

## 2021-04-21 NOTE — DISCHARGE NOTE PROVIDER - HOSPITAL COURSE
71 y/o F with PMHx of Alzheimer's dementia, HTN, seizure disorder, NPH s/p drainage, Acute respiratory failure secondary to pneumonia s/p PEG/trach at [facility] on 2017, recurrent UTIs sent from [facility] due to hypotension and blood in stool. As per chart her SBP was around 70/80s in nursing home. As per  she received enema 2-3 days ago at NH and after that the bleeding started. In ER VS were stable and initial hgb was 8. However Hb dropped soon after to 6.9. CRISTAL showed maroon colored stool.   Patient received 1 dose of cefepime and vancomycin each, 1L bolus of IVF and is currently receiving 2U pRBCs.     GI folowed up, H remained stable, hematochezia secondary to hemorrhoids. Was found to have UTI, treated with antibiotics. Fever resolved. Hemoglobin stable, to be discharged home.

## 2021-04-21 NOTE — PROGRESS NOTE ADULT - SUBJECTIVE AND OBJECTIVE BOX
Over Night Events: events noted, low grade fever    PHYSICAL EXAM    ICU Vital Signs Last 24 Hrs  T(C): 36.9 (2021 04:00), Max: 38 (2021 08:28)  T(F): 98.4 (2021 04:00), Max: 100.4 (2021 08:28)  HR: 85 (2021 05:04) (72 - 103)  BP: 143/82 (2021 05:04) (122/74 - 158/76)  BP(mean): 107 (2021 05:04) (93 - 112)  RR: 16 (2021 04:00) (15 - 24)  SpO2: 100% (2021 04:00) (97% - 100%)      General: ill looking  HEENT: trach            Lungs: Bilateral rhonchi  Cardiovascular: Regular   Abdomen: Soft, peg  Extremities: No clubbing   Skin: Warm  Neurological: Non focal       21 @ 07:01  -  21 @ 07:00  --------------------------------------------------------  IN:    Osmolite: 240 mL  Total IN: 240 mL    OUT:    Indwelling Catheter - Urethral (mL): 1000 mL  Total OUT: 1000 mL    Total NET: -760 mL      21 @ 07:01  -  21 @ 06:47  --------------------------------------------------------  IN:    Free Water: 750 mL    Osmolite: 720 mL    sodium chloride 0.45%: 1610 mL  Total IN: 3080 mL    OUT:    Indwelling Catheter - Urethral (mL): 1350 mL  Total OUT: 1350 mL    Total NET: 1730 mL          LABS:                          8.1    9.64  )-----------( 187      ( 2021 05:25 )             25.7                                               04-20    145  |  111<H>  |  23<H>  ----------------------------<  109<H>  4.5   |  25  |  0.8    Ca    8.8      2021 05:25    TPro  5.8<L>  /  Alb  2.7<L>  /  TBili  0.2  /  DBili  x   /  AST  24  /  ALT  34  /  AlkPhos  98  04-20                                             Urinalysis Basic - ( 2021 13:24 )    Color: Yellow / Appearance: Clear / S.016 / pH: x  Gluc: x / Ketone: Negative  / Bili: Negative / Urobili: <2 mg/dL   Blood: x / Protein: 30 mg/dL / Nitrite: Negative   Leuk Esterase: Negative / RBC: 20 /HPF / WBC 5 /HPF   Sq Epi: x / Non Sq Epi: 4 /HPF / Bacteria: Negative                                                  LIVER FUNCTIONS - ( 2021 05:25 )  Alb: 2.7 g/dL / Pro: 5.8 g/dL / ALK PHOS: 98 U/L / ALT: 34 U/L / AST: 24 U/L / GGT: x                                                                                               Mode: AC/ CMV (Assist Control/ Continuous Mandatory Ventilation)  RR (machine): 14  TV (machine): 400  FiO2: 40  PEEP: 5  ITime: 1  MAP: 8  PIP: 15                                          MEDICATIONS  (STANDING):  bethanechol 10 milliGRAM(s) Oral three times a day  chlorhexidine 4% Liquid 1 Application(s) Topical <User Schedule>  heparin SubCutaneous Injection - Peds 5000 Unit(s) SubCutaneous every 8 hours  hydrALAZINE 25 milliGRAM(s) Oral four times a day  loratadine 10 milliGRAM(s) Oral daily  LORazepam     Tablet 1 milliGRAM(s) Oral two times a day  melatonin 5 milliGRAM(s) Oral at bedtime  multivitamin/minerals 1 Tablet(s) Oral daily  pantoprazole  Injectable 40 milliGRAM(s) IV Push two times a day  saccharomyces boulardii 250 milliGRAM(s) Oral two times a day  sodium chloride 0.45%. 1000 milliLiter(s) (70 mL/Hr) IV Continuous <Continuous>    MEDICATIONS  (PRN):  acetaminophen   Tablet .. 650 milliGRAM(s) Oral every 6 hours PRN Temp greater or equal to 38C (100.4F), Mild Pain (1 - 3)  LORazepam     Tablet 0.5 milliGRAM(s) Oral every 6 hours PRN Agitation

## 2021-04-21 NOTE — PROGRESS NOTE ADULT - ASSESSMENT
IMPRESSION:    Acute blood loss/ anemia stable  GI bleed sp tx  History of advanced dementia  PEG/Trach  aspiration pneumonia ( increase wbc/ fever)  Renal failure improving    PLAN:    CNS: Avoid sedation    HEENT: Oral care    PULMONARY:  HOB @ 45 degrees. Aspiration precautions,  keep Sao2 92 to 96%, CXR    CARDIOVASCULAR: keep equal    GI: GI prophylaxis.  GI fup, . feeding    RENAL:  Follow up lytes.  Correct as needed,    INFECTIOUS DISEASE: Follow up culture, abx per ID    HEMATOLOGICAL:  DVT prophylaxis     ENDOCRINE:  Follow up FS.  Insulin protocol if needed.    MUSCULOSKELETAL: Bedrest  poor prognosis    DNR

## 2021-04-21 NOTE — DIETITIAN INITIAL EVALUATION ADULT. - PERSON TAUGHT/METHOD
d/c diet education not indicated r/t pt current medical condition + trach/vent dependent with PEG in place. never intubated

## 2021-04-21 NOTE — DIETITIAN INITIAL EVALUATION ADULT. - FACTORS AFF FOOD INTAKE
NPO with PEG in place. Pt receiving current EN regimen @ goal rate; no noted GI s/s of intolerance to current regimen. Last BM on 4/20./other (specify)

## 2021-04-21 NOTE — DIETITIAN INITIAL EVALUATION ADULT. - RD TO REMAIN AVAILABLE
INTERVENTION: EN, coordination of care. ME: RD to monitor diet order, energy intake, body composition, NFPF, glucose profile/yes

## 2021-04-22 VITALS
OXYGEN SATURATION: 100 % | RESPIRATION RATE: 16 BRPM | DIASTOLIC BLOOD PRESSURE: 68 MMHG | HEART RATE: 91 BPM | TEMPERATURE: 98 F | SYSTOLIC BLOOD PRESSURE: 126 MMHG | WEIGHT: 196.87 LBS

## 2021-04-22 LAB
ALBUMIN SERPL ELPH-MCNC: 2.6 G/DL — LOW (ref 3.5–5.2)
ALP SERPL-CCNC: 93 U/L — SIGNIFICANT CHANGE UP (ref 30–115)
ALT FLD-CCNC: 27 U/L — SIGNIFICANT CHANGE UP (ref 0–41)
ANION GAP SERPL CALC-SCNC: 9 MMOL/L — SIGNIFICANT CHANGE UP (ref 7–14)
AST SERPL-CCNC: 22 U/L — SIGNIFICANT CHANGE UP (ref 0–41)
BASOPHILS # BLD AUTO: 0.02 K/UL — SIGNIFICANT CHANGE UP (ref 0–0.2)
BASOPHILS NFR BLD AUTO: 0.2 % — SIGNIFICANT CHANGE UP (ref 0–1)
BILIRUB SERPL-MCNC: <0.2 MG/DL — SIGNIFICANT CHANGE UP (ref 0.2–1.2)
BUN SERPL-MCNC: 14 MG/DL — SIGNIFICANT CHANGE UP (ref 10–20)
CALCIUM SERPL-MCNC: 8.1 MG/DL — LOW (ref 8.5–10.1)
CHLORIDE SERPL-SCNC: 105 MMOL/L — SIGNIFICANT CHANGE UP (ref 98–110)
CO2 SERPL-SCNC: 22 MMOL/L — SIGNIFICANT CHANGE UP (ref 17–32)
CREAT SERPL-MCNC: 0.7 MG/DL — SIGNIFICANT CHANGE UP (ref 0.7–1.5)
EOSINOPHIL # BLD AUTO: 0.27 K/UL — SIGNIFICANT CHANGE UP (ref 0–0.7)
EOSINOPHIL NFR BLD AUTO: 3.3 % — SIGNIFICANT CHANGE UP (ref 0–8)
GLUCOSE SERPL-MCNC: 119 MG/DL — HIGH (ref 70–99)
HCT VFR BLD CALC: 22.8 % — LOW (ref 37–47)
HGB BLD-MCNC: 7.4 G/DL — LOW (ref 12–16)
IMM GRANULOCYTES NFR BLD AUTO: 1 % — HIGH (ref 0.1–0.3)
LYMPHOCYTES # BLD AUTO: 1.32 K/UL — SIGNIFICANT CHANGE UP (ref 1.2–3.4)
LYMPHOCYTES # BLD AUTO: 16.2 % — LOW (ref 20.5–51.1)
MAGNESIUM SERPL-MCNC: 1.5 MG/DL — LOW (ref 1.8–2.4)
MCHC RBC-ENTMCNC: 29.8 PG — SIGNIFICANT CHANGE UP (ref 27–31)
MCHC RBC-ENTMCNC: 32.5 G/DL — SIGNIFICANT CHANGE UP (ref 32–37)
MCV RBC AUTO: 91.9 FL — SIGNIFICANT CHANGE UP (ref 81–99)
MONOCYTES # BLD AUTO: 0.68 K/UL — HIGH (ref 0.1–0.6)
MONOCYTES NFR BLD AUTO: 8.3 % — SIGNIFICANT CHANGE UP (ref 1.7–9.3)
NEUTROPHILS # BLD AUTO: 5.78 K/UL — SIGNIFICANT CHANGE UP (ref 1.4–6.5)
NEUTROPHILS NFR BLD AUTO: 71 % — SIGNIFICANT CHANGE UP (ref 42.2–75.2)
NRBC # BLD: 0 /100 WBCS — SIGNIFICANT CHANGE UP (ref 0–0)
PLATELET # BLD AUTO: 268 K/UL — SIGNIFICANT CHANGE UP (ref 130–400)
POTASSIUM SERPL-MCNC: 3.8 MMOL/L — SIGNIFICANT CHANGE UP (ref 3.5–5)
POTASSIUM SERPL-SCNC: 3.8 MMOL/L — SIGNIFICANT CHANGE UP (ref 3.5–5)
PROT SERPL-MCNC: 5.6 G/DL — LOW (ref 6–8)
RBC # BLD: 2.48 M/UL — LOW (ref 4.2–5.4)
RBC # FLD: 13 % — SIGNIFICANT CHANGE UP (ref 11.5–14.5)
SARS-COV-2 RNA SPEC QL NAA+PROBE: SIGNIFICANT CHANGE UP
SODIUM SERPL-SCNC: 136 MMOL/L — SIGNIFICANT CHANGE UP (ref 135–146)
WBC # BLD: 8.15 K/UL — SIGNIFICANT CHANGE UP (ref 4.8–10.8)
WBC # FLD AUTO: 8.15 K/UL — SIGNIFICANT CHANGE UP (ref 4.8–10.8)

## 2021-04-22 PROCEDURE — 99232 SBSQ HOSP IP/OBS MODERATE 35: CPT

## 2021-04-22 PROCEDURE — 99239 HOSP IP/OBS DSCHRG MGMT >30: CPT

## 2021-04-22 RX ORDER — MAGNESIUM SULFATE 500 MG/ML
2 VIAL (ML) INJECTION
Refills: 0 | Status: COMPLETED | OUTPATIENT
Start: 2021-04-22 | End: 2021-04-22

## 2021-04-22 RX ADMIN — Medication 0.5 MILLIGRAM(S): at 08:59

## 2021-04-22 RX ADMIN — Medication 25 MILLIGRAM(S): at 05:20

## 2021-04-22 RX ADMIN — Medication 10 MILLIGRAM(S): at 15:00

## 2021-04-22 RX ADMIN — Medication 25 MILLIGRAM(S): at 11:34

## 2021-04-22 RX ADMIN — Medication 25 MILLIGRAM(S): at 17:47

## 2021-04-22 RX ADMIN — Medication 25 MILLIGRAM(S): at 01:09

## 2021-04-22 RX ADMIN — Medication 50 GRAM(S): at 15:00

## 2021-04-22 RX ADMIN — Medication 1 MILLIGRAM(S): at 17:47

## 2021-04-22 RX ADMIN — Medication 1 MILLIGRAM(S): at 05:19

## 2021-04-22 RX ADMIN — HEPARIN SODIUM 5000 UNIT(S): 5000 INJECTION INTRAVENOUS; SUBCUTANEOUS at 05:20

## 2021-04-22 RX ADMIN — HEPARIN SODIUM 5000 UNIT(S): 5000 INJECTION INTRAVENOUS; SUBCUTANEOUS at 15:00

## 2021-04-22 RX ADMIN — Medication 50 GRAM(S): at 11:42

## 2021-04-22 RX ADMIN — Medication 250 MILLIGRAM(S): at 17:47

## 2021-04-22 RX ADMIN — PANTOPRAZOLE SODIUM 40 MILLIGRAM(S): 20 TABLET, DELAYED RELEASE ORAL at 05:21

## 2021-04-22 RX ADMIN — LORATADINE 10 MILLIGRAM(S): 10 TABLET ORAL at 11:34

## 2021-04-22 RX ADMIN — CHLORHEXIDINE GLUCONATE 1 APPLICATION(S): 213 SOLUTION TOPICAL at 05:21

## 2021-04-22 RX ADMIN — Medication 1 TABLET(S): at 11:34

## 2021-04-22 RX ADMIN — PANTOPRAZOLE SODIUM 40 MILLIGRAM(S): 20 TABLET, DELAYED RELEASE ORAL at 17:48

## 2021-04-22 RX ADMIN — Medication 10 MILLIGRAM(S): at 05:20

## 2021-04-22 NOTE — ADVANCED PRACTICE NURSE CONSULT - ASSESSMENT
69 y/o F with PMHx of Alzheimer's dementia, HTN, seizure disorder, NPH s/p drainage, Acute respiratory failure secondary to pneumonia s/p PEG/trach at [facility] on 2017, recurrent UTIs sent from [facility] (4/15) due to hypotension and blood in stool. As per  she received enema 2-3 days ago at NH and after that the bleeding started. In ER VS were stable and initial hgb was 8. However Hb dropped soon after to 6.9. CRISTAL showed maroon colored stool.   Currently admitted to medicine with the primary diagnosis of Gastrointestinal bleed, today is hospital day 7d; in ED3, being managed for UTI; GI Bleed; Clogged PEG tube; urinary retention/Neurogenic bladder?; Alzhiemer's Dementia/seizure disorder/NPH; HTN; Agitation/anxiety; Insomnia.    Received patient in ED3 laying supine in bed, turned to right side (pillow under left side & underneath BLEs elevating heels), HOB elevated 30 degrees. Pt remains w/ eyes closed, nonresponsive, vented via trach, primary RN Kathy at bedside, made aware of purpose of WOCN visit, agreeable to consult. RN reports pt's d/c done, transport called, awaiting arrival to bring patient to SNF.    With assistance from RN, turned patient to left side for skin assessment. Patient stiff, very difficult to mobilize.     Incontinence associated dermatitis to b/l buttock-intergluteal cleft area, skin denuded, skin tear without epidermal flap to right inner buttock-gluteal cleft ~2cm x 1.5cm x 0.1cm, wound bed w/ dark pink tissue present, no drainage, odor, induration, warmth, or s/s pain present.     Patient bedbound, immobile. Incontinent of urine and stool. Receives TF via PEG.

## 2021-04-22 NOTE — PROGRESS NOTE ADULT - SUBJECTIVE AND OBJECTIVE BOX
OVERNIGHT EVENTS: events noted, no fever    Vital Signs Last 24 Hrs  T(C): 36.7 (2021 04:00), Max: 37.3 (2021 00:00)  T(F): 98 (2021 04:00), Max: 99.2 (2021 00:00)  HR: 89 (2021 04:00) (80 - 98)  BP: 114/65 (2021 05:20) (108/62 - 140/84)  BP(mean): 84 (2021 05:20) (80 - 108)  RR: 16 (2021 04:00) (14 - 18)  SpO2: 99% (2021 04:00) (99% - 100%)    PHYSICAL EXAMINATION:    GENERAL: ILL looking    HEENT: Head is normocephalic and atraumatic. trach    NECK: Supple.    LUNGS: bibasilar rhonchi    HEART: Regular rate and rhythm without murmur.    ABDOMEN: Soft, peg    EXTREMITIES: Without any cyanosis, clubbing, rash, lesions or edema.    NEUROLOGIC: not following commands        LABS:                        8.0    8.87  )-----------( 218      ( 2021 08:10 )             24.9         137  |  105  |  18  ----------------------------<  158<H>  4.2   |  22  |  0.6<L>    Ca    8.6      2021 08:10    TPro  5.7<L>  /  Alb  2.7<L>  /  TBili  <0.2  /  DBili  x   /  AST  22  /  ALT  28  /  AlkPhos  95        Urinalysis Basic - ( 2021 13:24 )    Color: Yellow / Appearance: Clear / S.016 / pH: x  Gluc: x / Ketone: Negative  / Bili: Negative / Urobili: <2 mg/dL   Blood: x / Protein: 30 mg/dL / Nitrite: Negative   Leuk Esterase: Negative / RBC: 20 /HPF / WBC 5 /HPF   Sq Epi: x / Non Sq Epi: 4 /HPF / Bacteria: Negative                        21 @ 07:01  -  21 @ 07:00  --------------------------------------------------------  IN: 3080 mL / OUT: 1350 mL / NET: 1730 mL    21 @ 07:01  -  21 @ 06:58  --------------------------------------------------------  IN: 3080 mL / OUT: 2475 mL / NET: 605 mL        MICROBIOLOGY:  Culture Results:   No growth to date. ( @ 18:52)      MEDICATIONS  (STANDING):  bethanechol 10 milliGRAM(s) Oral three times a day  chlorhexidine 4% Liquid 1 Application(s) Topical <User Schedule>  heparin SubCutaneous Injection - Peds 5000 Unit(s) SubCutaneous every 8 hours  hydrALAZINE 25 milliGRAM(s) Oral four times a day  loratadine 10 milliGRAM(s) Oral daily  LORazepam     Tablet 1 milliGRAM(s) Oral two times a day  melatonin 5 milliGRAM(s) Oral at bedtime  multivitamin/minerals 1 Tablet(s) Oral daily  pantoprazole  Injectable 40 milliGRAM(s) IV Push two times a day  saccharomyces boulardii 250 milliGRAM(s) Oral two times a day  sodium chloride 0.45%. 1000 milliLiter(s) (70 mL/Hr) IV Continuous <Continuous>    MEDICATIONS  (PRN):  acetaminophen   Tablet .. 650 milliGRAM(s) Oral every 6 hours PRN Temp greater or equal to 38C (100.4F), Mild Pain (1 - 3)  LORazepam     Tablet 0.5 milliGRAM(s) Oral every 6 hours PRN Agitation      RADIOLOGY & ADDITIONAL STUDIES:

## 2021-04-22 NOTE — PROGRESS NOTE ADULT - ASSESSMENT
IMPRESSION:    Acute blood loss/ anemia stable  GI bleed sp tx  History of advanced dementia  PEG/Trach vent dependant  aspiration pneumonia ( increase wbc/ fever)  Renal failure resolved    PLAN:    CNS: Avoid sedation    HEENT: Oral care    PULMONARY:  HOB @ 45 degrees. Aspiration precautions,  keep Sao2 92 to 96%, not weanable    CARDIOVASCULAR: keep equal    GI: GI prophylaxis.  GI fup, . feeding    RENAL:  Follow up lytes.  Correct as needed,    INFECTIOUS DISEASE: Follow up culture, abx per ID    HEMATOLOGICAL:  DVT prophylaxis     ENDOCRINE:  Follow up FS.  Insulin protocol if needed.    MUSCULOSKELETAL: Bedrest  poor prognosis    dc planning

## 2021-04-22 NOTE — PROGRESS NOTE ADULT - ASSESSMENT
Ms Cervantes is a 69 yo woman with medical history of Alzheimer's dementia, HTN, seizure disorder, NPH s/p drainage, Acute respiratory failure secondary to pneumonia s/p PEG/trach at [facility] on 2017, recurrent UTIs sent from [facility] due to hypotension and blood in stool.       #UTI   s/p antibiotic treatment for 3 days    #GI bleed  GI evaluated, source most likely hemorrhoidal  Hgb stable, s/p 2 unit of PRBC     #Clogged PEG tube  fixed by GI, now working    #urinary retention/Neurogenic bladder?   bethanechol 10 milliGRAM(s) Oral three times a day    #Alzhiemer's Dementia/seizure disorder/NPH   Peg/trach    #HTN   hydrALAZINE 25 milliGRAM(s) Oral four times a day    #Allergy   loratadine 10 milliGRAM(s) Oral daily    #Agitation/anxiety   LORazepam  Tablet 1 milliGRAM(s) Oral two times a day    #insomnia   melatonin 5 milliGRAM(s) Oral at bedtime    DVT ppx: Heparin  GI ppx: protonix  Diet: feeding through peg tube  Code: DNR/DNI    dc home today

## 2021-04-22 NOTE — PROGRESS NOTE ADULT - SUBJECTIVE AND OBJECTIVE BOX
SUBJECTIVE:    Patient is a 70y old Female who presents with a chief complaint of Blood in stool (22 Apr 2021 06:58)    Currently admitted to medicine with the primary diagnosis of Gastrointestinal bleed       Today is hospital day 7d. This morning she is resting comfortably in bed and reports no new issues or overnight events.     PAST MEDICAL & SURGICAL HISTORY  Alzheimer disease    HTN (hypertension)    Tracheostomy present    PEG (percutaneous endoscopic gastrostomy) status      SOCIAL HISTORY:  Negative for smoking/alcohol/drug use.     ALLERGIES:  No Known Allergies    MEDICATIONS:  STANDING MEDICATIONS  bethanechol 10 milliGRAM(s) Oral three times a day  chlorhexidine 4% Liquid 1 Application(s) Topical <User Schedule>  heparin SubCutaneous Injection - Peds 5000 Unit(s) SubCutaneous every 8 hours  hydrALAZINE 25 milliGRAM(s) Oral four times a day  loratadine 10 milliGRAM(s) Oral daily  LORazepam     Tablet 1 milliGRAM(s) Oral two times a day  magnesium sulfate  IVPB 2 Gram(s) IV Intermittent every 2 hours  melatonin 5 milliGRAM(s) Oral at bedtime  multivitamin/minerals 1 Tablet(s) Oral daily  pantoprazole  Injectable 40 milliGRAM(s) IV Push two times a day  saccharomyces boulardii 250 milliGRAM(s) Oral two times a day  sodium chloride 0.45%. 1000 milliLiter(s) IV Continuous <Continuous>    PRN MEDICATIONS  acetaminophen   Tablet .. 650 milliGRAM(s) Oral every 6 hours PRN  LORazepam     Tablet 0.5 milliGRAM(s) Oral every 6 hours PRN    VITALS:   T(F): 97.8  HR: 98  BP: 132/78  RR: 15  SpO2: 80%    LABS:                        7.4    8.15  )-----------( 268      ( 22 Apr 2021 08:44 )             22.8     04-22    136  |  105  |  14  ----------------------------<  119<H>  3.8   |  22  |  0.7    Ca    8.1<L>      22 Apr 2021 08:44  Mg     1.5     04-22    TPro  5.6<L>  /  Alb  2.6<L>  /  TBili  <0.2  /  DBili  x   /  AST  22  /  ALT  27  /  AlkPhos  93  04-22              Culture - Blood (collected 20 Apr 2021 18:52)  Source: .Blood Blood  Preliminary Report (22 Apr 2021 02:29):    No growth to date.            PHYSICAL EXAM:  GENERAL: NAD, Peg/trach, opens eyes, does not follow commands  HEENT:  Atraumatic, Normocephalic.  PULMONARY: Clear   CARDIOVASCULAR: s1/s2, RRR  GASTROINTESTINAL: Soft, Nontender, Nondistended; Bowel sounds present  MUSCULOSKELETAL:  2+ Peripheral Pulses, No clubbing, cyanosis, or edema

## 2021-04-22 NOTE — PROGRESS NOTE ADULT - REASON FOR ADMISSION
Blood in stool
BRPR
Blood in stool
Blood in stool

## 2021-04-22 NOTE — PROGRESS NOTE ADULT - NSICDXPILOT_GEN_ALL_CORE
[place]

## 2021-04-22 NOTE — ADVANCED PRACTICE NURSE CONSULT - RECOMMEDATIONS
1. IAD b/l buttock, skin tear R buttock-intergluteal cleft- Continue applying Coloplast Roberto Protect moisture barrier cream to buttock and perineal area daily and prn after each incontinent episode.    -Assess skin/wound qshift, report changes to primary provider.     Additional recs:   -Continue turning/positioning patient from side-to-side q2h while in bed, or in accordance w/ pt's plan of care. Continue utilizing pillow to assist w/ turning/positioning.   -Continue to offload heels from bed surface with soft pillow under calfs or by applying offloading boots to BLEs.   -Continue utilizing one underpad underneath patient to contain incontinence episodes; change pad when saturated/soiled.     Plan of Care: Primary RN Kathy made aware of above recs. Signing off on patient, no further needs/recs from [facility] service at this time. Staff RN to perform routine skin/wound assessment and manage wound care. Questions or concerns, please contact [facility], [phone].

## 2021-04-22 NOTE — PROGRESS NOTE ADULT - PROVIDER SPECIALTY LIST ADULT
Hospitalist
Internal Medicine
Pulmonology
Critical Care
Pulmonology
Pulmonology
Internal Medicine
Pulmonology
Pulmonology
Gastroenterology
Internal Medicine

## 2021-04-22 NOTE — DISCHARGE NOTE NURSING/CASE MANAGEMENT/SOCIAL WORK - PATIENT PORTAL LINK FT
You can access the FollowMyHealth Patient Portal offered by [facility] by registering at the following website: [email or web address]. By joining [facility]’s FollowMyHealth portal, you will also be able to view your health information using other applications (apps) compatible with our system.

## 2021-04-26 LAB
CULTURE RESULTS: SIGNIFICANT CHANGE UP
SPECIMEN SOURCE: SIGNIFICANT CHANGE UP

## 2021-05-03 DIAGNOSIS — Z66 DO NOT RESUSCITATE: ICD-10-CM
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
## 2021-05-03 DIAGNOSIS — R00.0 TACHYCARDIA, UNSPECIFIED: ICD-10-CM
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
## 2021-05-03 DIAGNOSIS — J69.0 PNEUMONITIS DUE TO INHALATION OF FOOD AND VOMIT: ICD-10-CM

## 2021-05-03 DIAGNOSIS — K94.23 GASTROSTOMY MALFUNCTION: ICD-10-CM

## 2021-05-03 DIAGNOSIS — Z87.440 PERSONAL HISTORY OF URINARY (TRACT) INFECTIONS: ICD-10-CM
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
## 2021-05-03 DIAGNOSIS — R74.01 ELEVATION OF LEVELS OF LIVER TRANSAMINASE LEVELS: ICD-10-CM

## 2021-05-03 DIAGNOSIS — Z74.01 BED CONFINEMENT STATUS: ICD-10-CM

## 2021-05-03 DIAGNOSIS — I10 ESSENTIAL (PRIMARY) HYPERTENSION: ICD-10-CM

## 2021-05-03 DIAGNOSIS — R33.9 RETENTION OF URINE, UNSPECIFIED: ICD-10-CM

## 2021-05-03 DIAGNOSIS — K64.9 UNSPECIFIED HEMORRHOIDS: ICD-10-CM

## 2021-05-03 DIAGNOSIS — N17.9 ACUTE KIDNEY FAILURE, UNSPECIFIED: ICD-10-CM

## 2021-05-03 DIAGNOSIS — Z99.11 DEPENDENCE ON RESPIRATOR [VENTILATOR] STATUS: ICD-10-CM

## 2021-05-03 DIAGNOSIS — G30.9 ALZHEIMER'S DISEASE, UNSPECIFIED: ICD-10-CM

## 2021-05-03 DIAGNOSIS — Z93.0 TRACHEOSTOMY STATUS: ICD-10-CM

## 2021-05-03 DIAGNOSIS — G91.2 (IDIOPATHIC) NORMAL PRESSURE HYDROCEPHALUS: ICD-10-CM

## 2021-05-03 DIAGNOSIS — N39.0 URINARY TRACT INFECTION, SITE NOT SPECIFIED: ICD-10-CM

## 2021-05-03 DIAGNOSIS — K92.2 GASTROINTESTINAL HEMORRHAGE, UNSPECIFIED: ICD-10-CM

## 2021-05-03 DIAGNOSIS — F02.81 DEMENTIA IN OTHER DISEASES CLASSIFIED ELSEWHERE, UNSPECIFIED SEVERITY, WITH BEHAVIORAL DISTURBANCE: ICD-10-CM

## 2021-05-03 DIAGNOSIS — G40.909 EPILEPSY, UNSPECIFIED, NOT INTRACTABLE, WITHOUT STATUS EPILEPTICUS: ICD-10-CM

## 2021-05-03 DIAGNOSIS — I95.9 HYPOTENSION, UNSPECIFIED: ICD-10-CM

## 2021-05-03 DIAGNOSIS — G47.00 INSOMNIA, UNSPECIFIED: ICD-10-CM

## 2021-05-03 DIAGNOSIS — D62 ACUTE POSTHEMORRHAGIC ANEMIA: ICD-10-CM

## 2021-05-03 DIAGNOSIS — T78.40XA ALLERGY, UNSPECIFIED, INITIAL ENCOUNTER: ICD-10-CM

## 2021-05-03 DIAGNOSIS — N31.9 NEUROMUSCULAR DYSFUNCTION OF BLADDER, UNSPECIFIED: ICD-10-CM

## 2021-05-03 DIAGNOSIS — F41.9 ANXIETY DISORDER, UNSPECIFIED: ICD-10-CM

## 2021-05-03 DIAGNOSIS — E56.9 VITAMIN DEFICIENCY, UNSPECIFIED: ICD-10-CM

## 2021-05-03 DIAGNOSIS — J96.10 CHRONIC RESPIRATORY FAILURE, UNSPECIFIED WHETHER WITH HYPOXIA OR HYPERCAPNIA: ICD-10-CM

## 2021-11-18 ENCOUNTER — INPATIENT (INPATIENT)
Facility: HOSPITAL | Age: 71
LOS: 4 days | Discharge: NURSING, REHAB OR LONG-TERM CARE | End: 2021-11-23
Attending: INTERNAL MEDICINE | Admitting: INTERNAL MEDICINE
Payer: MEDICARE

## 2021-11-18 VITALS
WEIGHT: 169.98 LBS | TEMPERATURE: 98 F | OXYGEN SATURATION: 97 % | SYSTOLIC BLOOD PRESSURE: 152 MMHG | RESPIRATION RATE: 20 BRPM | HEART RATE: 108 BPM | DIASTOLIC BLOOD PRESSURE: 100 MMHG

## 2021-11-18 DIAGNOSIS — Z93.0 TRACHEOSTOMY STATUS: Chronic | ICD-10-CM

## 2021-11-18 DIAGNOSIS — Z93.1 GASTROSTOMY STATUS: Chronic | ICD-10-CM

## 2021-11-18 LAB
BASOPHILS # BLD AUTO: 0.02 K/UL — SIGNIFICANT CHANGE UP (ref 0–0.2)
BASOPHILS NFR BLD AUTO: 0.3 % — SIGNIFICANT CHANGE UP (ref 0–1)
EOSINOPHIL # BLD AUTO: 0.07 K/UL — SIGNIFICANT CHANGE UP (ref 0–0.7)
EOSINOPHIL NFR BLD AUTO: 0.9 % — SIGNIFICANT CHANGE UP (ref 0–8)
HCT VFR BLD CALC: 41.1 % — SIGNIFICANT CHANGE UP (ref 37–47)
HGB BLD-MCNC: 13.1 G/DL — SIGNIFICANT CHANGE UP (ref 12–16)
IMM GRANULOCYTES NFR BLD AUTO: 0.4 % — HIGH (ref 0.1–0.3)
LYMPHOCYTES # BLD AUTO: 0.96 K/UL — LOW (ref 1.2–3.4)
LYMPHOCYTES # BLD AUTO: 12.2 % — LOW (ref 20.5–51.1)
MCHC RBC-ENTMCNC: 29.4 PG — SIGNIFICANT CHANGE UP (ref 27–31)
MCHC RBC-ENTMCNC: 31.9 G/DL — LOW (ref 32–37)
MCV RBC AUTO: 92.2 FL — SIGNIFICANT CHANGE UP (ref 81–99)
MONOCYTES # BLD AUTO: 1.05 K/UL — HIGH (ref 0.1–0.6)
MONOCYTES NFR BLD AUTO: 13.4 % — HIGH (ref 1.7–9.3)
NEUTROPHILS # BLD AUTO: 5.72 K/UL — SIGNIFICANT CHANGE UP (ref 1.4–6.5)
NEUTROPHILS NFR BLD AUTO: 72.8 % — SIGNIFICANT CHANGE UP (ref 42.2–75.2)
NRBC # BLD: 0 /100 WBCS — SIGNIFICANT CHANGE UP (ref 0–0)
PLATELET # BLD AUTO: 285 K/UL — SIGNIFICANT CHANGE UP (ref 130–400)
RBC # BLD: 4.46 M/UL — SIGNIFICANT CHANGE UP (ref 4.2–5.4)
RBC # FLD: 18.3 % — HIGH (ref 11.5–14.5)
WBC # BLD: 7.85 K/UL — SIGNIFICANT CHANGE UP (ref 4.8–10.8)
WBC # FLD AUTO: 7.85 K/UL — SIGNIFICANT CHANGE UP (ref 4.8–10.8)

## 2021-11-18 PROCEDURE — 99285 EMERGENCY DEPT VISIT HI MDM: CPT

## 2021-11-18 RX ORDER — PANTOPRAZOLE SODIUM 20 MG/1
80 TABLET, DELAYED RELEASE ORAL ONCE
Refills: 0 | Status: COMPLETED | OUTPATIENT
Start: 2021-11-18 | End: 2021-11-18

## 2021-11-18 RX ORDER — PANTOPRAZOLE SODIUM 20 MG/1
8 TABLET, DELAYED RELEASE ORAL
Qty: 80 | Refills: 0 | Status: DISCONTINUED | OUTPATIENT
Start: 2021-11-18 | End: 2021-11-20

## 2021-11-18 RX ADMIN — PANTOPRAZOLE SODIUM 10 MG/HR: 20 TABLET, DELAYED RELEASE ORAL at 23:35

## 2021-11-18 RX ADMIN — PANTOPRAZOLE SODIUM 80 MILLIGRAM(S): 20 TABLET, DELAYED RELEASE ORAL at 23:35

## 2021-11-19 PROBLEM — G30.9 ALZHEIMER'S DISEASE, UNSPECIFIED: Chronic | Status: ACTIVE | Noted: 2021-04-15

## 2021-11-19 PROBLEM — I10 ESSENTIAL (PRIMARY) HYPERTENSION: Chronic | Status: ACTIVE | Noted: 2021-04-15

## 2021-11-19 LAB
ALBUMIN SERPL ELPH-MCNC: 3.7 G/DL — SIGNIFICANT CHANGE UP (ref 3.5–5.2)
ALP SERPL-CCNC: 91 U/L — SIGNIFICANT CHANGE UP (ref 30–115)
ALT FLD-CCNC: 29 U/L — SIGNIFICANT CHANGE UP (ref 0–41)
ANION GAP SERPL CALC-SCNC: 13 MMOL/L — SIGNIFICANT CHANGE UP (ref 7–14)
APTT BLD: 24 SEC — LOW (ref 27–39.2)
AST SERPL-CCNC: 119 U/L — HIGH (ref 0–41)
BASE EXCESS BLDV CALC-SCNC: 6.5 MMOL/L — HIGH (ref -2–3)
BASOPHILS # BLD AUTO: 0.04 K/UL — SIGNIFICANT CHANGE UP (ref 0–0.2)
BASOPHILS NFR BLD AUTO: 0.5 % — SIGNIFICANT CHANGE UP (ref 0–1)
BILIRUB DIRECT SERPL-MCNC: 0.2 MG/DL — SIGNIFICANT CHANGE UP (ref 0–0.2)
BILIRUB INDIRECT FLD-MCNC: 0.1 MG/DL — LOW (ref 0.2–1.2)
BILIRUB SERPL-MCNC: 0.3 MG/DL — SIGNIFICANT CHANGE UP (ref 0.2–1.2)
BLD GP AB SCN SERPL QL: SIGNIFICANT CHANGE UP
BUN SERPL-MCNC: 24 MG/DL — HIGH (ref 10–20)
CA-I SERPL-SCNC: 1.24 MMOL/L — SIGNIFICANT CHANGE UP (ref 1.15–1.33)
CALCIUM SERPL-MCNC: 9.2 MG/DL — SIGNIFICANT CHANGE UP (ref 8.5–10.1)
CHLORIDE SERPL-SCNC: 101 MMOL/L — SIGNIFICANT CHANGE UP (ref 98–110)
CO2 SERPL-SCNC: 20 MMOL/L — SIGNIFICANT CHANGE UP (ref 17–32)
CREAT SERPL-MCNC: 0.8 MG/DL — SIGNIFICANT CHANGE UP (ref 0.7–1.5)
EOSINOPHIL # BLD AUTO: 0.15 K/UL — SIGNIFICANT CHANGE UP (ref 0–0.7)
EOSINOPHIL NFR BLD AUTO: 1.8 % — SIGNIFICANT CHANGE UP (ref 0–8)
GAS PNL BLDV: 137 MMOL/L — SIGNIFICANT CHANGE UP (ref 136–145)
GAS PNL BLDV: SIGNIFICANT CHANGE UP
GAS PNL BLDV: SIGNIFICANT CHANGE UP
GLUCOSE BLDC GLUCOMTR-MCNC: 94 MG/DL — SIGNIFICANT CHANGE UP (ref 70–99)
GLUCOSE SERPL-MCNC: 122 MG/DL — HIGH (ref 70–99)
HCO3 BLDV-SCNC: 29 MMOL/L — SIGNIFICANT CHANGE UP (ref 22–29)
HCT VFR BLD CALC: 37.8 % — SIGNIFICANT CHANGE UP (ref 37–47)
HCT VFR BLDA CALC: 39 % — SIGNIFICANT CHANGE UP (ref 34.5–46.5)
HGB BLD CALC-MCNC: 13 G/DL — SIGNIFICANT CHANGE UP (ref 11.7–16.1)
HGB BLD-MCNC: 11.9 G/DL — LOW (ref 12–16)
HOROWITZ INDEX BLDV+IHG-RTO: 40 — SIGNIFICANT CHANGE UP
IMM GRANULOCYTES NFR BLD AUTO: 0.2 % — SIGNIFICANT CHANGE UP (ref 0.1–0.3)
INR BLD: 1.07 RATIO — SIGNIFICANT CHANGE UP (ref 0.65–1.3)
LACTATE BLDV-MCNC: 0.8 MMOL/L — SIGNIFICANT CHANGE UP (ref 0.5–2)
LACTATE SERPL-SCNC: 0.7 MMOL/L — SIGNIFICANT CHANGE UP (ref 0.7–2)
LYMPHOCYTES # BLD AUTO: 2.25 K/UL — SIGNIFICANT CHANGE UP (ref 1.2–3.4)
LYMPHOCYTES # BLD AUTO: 27.5 % — SIGNIFICANT CHANGE UP (ref 20.5–51.1)
MCHC RBC-ENTMCNC: 29.8 PG — SIGNIFICANT CHANGE UP (ref 27–31)
MCHC RBC-ENTMCNC: 31.5 G/DL — LOW (ref 32–37)
MCV RBC AUTO: 94.5 FL — SIGNIFICANT CHANGE UP (ref 81–99)
MONOCYTES # BLD AUTO: 1.14 K/UL — HIGH (ref 0.1–0.6)
MONOCYTES NFR BLD AUTO: 13.9 % — HIGH (ref 1.7–9.3)
NEUTROPHILS # BLD AUTO: 4.58 K/UL — SIGNIFICANT CHANGE UP (ref 1.4–6.5)
NEUTROPHILS NFR BLD AUTO: 56.1 % — SIGNIFICANT CHANGE UP (ref 42.2–75.2)
NRBC # BLD: 0 /100 WBCS — SIGNIFICANT CHANGE UP (ref 0–0)
PCO2 BLDV: 34 MMHG — LOW (ref 39–42)
PH BLDV: 7.54 — HIGH (ref 7.32–7.43)
PLATELET # BLD AUTO: 291 K/UL — SIGNIFICANT CHANGE UP (ref 130–400)
PO2 BLDV: 113 MMHG — SIGNIFICANT CHANGE UP
POTASSIUM BLDV-SCNC: 4.1 MMOL/L — SIGNIFICANT CHANGE UP (ref 3.5–5.1)
POTASSIUM SERPL-MCNC: SIGNIFICANT CHANGE UP MMOL/L (ref 3.5–5)
POTASSIUM SERPL-SCNC: SIGNIFICANT CHANGE UP MMOL/L (ref 3.5–5)
PROT SERPL-MCNC: 7.9 G/DL — SIGNIFICANT CHANGE UP (ref 6–8)
PROTHROM AB SERPL-ACNC: 12.3 SEC — SIGNIFICANT CHANGE UP (ref 9.95–12.87)
RBC # BLD: 4 M/UL — LOW (ref 4.2–5.4)
RBC # FLD: 18.3 % — HIGH (ref 11.5–14.5)
SAO2 % BLDV: 99 % — SIGNIFICANT CHANGE UP
SARS-COV-2 RNA SPEC QL NAA+PROBE: SIGNIFICANT CHANGE UP
SODIUM SERPL-SCNC: 134 MMOL/L — LOW (ref 135–146)
WBC # BLD: 8.18 K/UL — SIGNIFICANT CHANGE UP (ref 4.8–10.8)
WBC # FLD AUTO: 8.18 K/UL — SIGNIFICANT CHANGE UP (ref 4.8–10.8)

## 2021-11-19 PROCEDURE — 99221 1ST HOSP IP/OBS SF/LOW 40: CPT

## 2021-11-19 PROCEDURE — 99223 1ST HOSP IP/OBS HIGH 75: CPT | Mod: GC

## 2021-11-19 PROCEDURE — 71045 X-RAY EXAM CHEST 1 VIEW: CPT | Mod: 26

## 2021-11-19 PROCEDURE — 99221 1ST HOSP IP/OBS SF/LOW 40: CPT | Mod: GC

## 2021-11-19 RX ORDER — ERYTHROPOIETIN 10000 [IU]/ML
1 INJECTION, SOLUTION INTRAVENOUS; SUBCUTANEOUS
Qty: 0 | Refills: 0 | DISCHARGE

## 2021-11-19 RX ORDER — TIZANIDINE 4 MG/1
1 TABLET ORAL
Qty: 0 | Refills: 0 | DISCHARGE

## 2021-11-19 RX ORDER — HYDRALAZINE HCL 50 MG
1 TABLET ORAL
Qty: 0 | Refills: 0 | DISCHARGE

## 2021-11-19 RX ORDER — GABAPENTIN 400 MG/1
300 CAPSULE ORAL
Refills: 0 | Status: DISCONTINUED | OUTPATIENT
Start: 2021-11-19 | End: 2021-11-23

## 2021-11-19 RX ORDER — OMEPRAZOLE 10 MG/1
1 CAPSULE, DELAYED RELEASE ORAL
Qty: 0 | Refills: 0 | DISCHARGE

## 2021-11-19 RX ORDER — MULTIVIT-MIN/FERROUS GLUCONATE 9 MG/15 ML
1 LIQUID (ML) ORAL
Qty: 0 | Refills: 0 | DISCHARGE

## 2021-11-19 RX ORDER — LORATADINE 10 MG/1
1 TABLET ORAL
Qty: 0 | Refills: 0 | DISCHARGE

## 2021-11-19 RX ORDER — SACCHAROMYCES BOULARDII 250 MG
250 POWDER IN PACKET (EA) ORAL
Refills: 0 | Status: DISCONTINUED | OUTPATIENT
Start: 2021-11-19 | End: 2021-11-23

## 2021-11-19 RX ORDER — GABAPENTIN 400 MG/1
1 CAPSULE ORAL
Qty: 0 | Refills: 0 | DISCHARGE

## 2021-11-19 RX ORDER — ACETAMINOPHEN 500 MG
1 TABLET ORAL
Qty: 0 | Refills: 0 | DISCHARGE

## 2021-11-19 RX ORDER — ALBUTEROL 90 UG/1
2 AEROSOL, METERED ORAL
Qty: 0 | Refills: 0 | DISCHARGE

## 2021-11-19 RX ORDER — GABAPENTIN 400 MG/1
400 CAPSULE ORAL AT BEDTIME
Refills: 0 | Status: DISCONTINUED | OUTPATIENT
Start: 2021-11-19 | End: 2021-11-23

## 2021-11-19 RX ORDER — FAMOTIDINE 10 MG/ML
0.5 INJECTION INTRAVENOUS
Qty: 0 | Refills: 0 | DISCHARGE

## 2021-11-19 RX ORDER — LANOLIN ALCOHOL/MO/W.PET/CERES
1 CREAM (GRAM) TOPICAL
Qty: 0 | Refills: 0 | DISCHARGE

## 2021-11-19 RX ORDER — BETHANECHOL CHLORIDE 25 MG
10 TABLET ORAL THREE TIMES A DAY
Refills: 0 | Status: DISCONTINUED | OUTPATIENT
Start: 2021-11-19 | End: 2021-11-23

## 2021-11-19 RX ORDER — SENNA PLUS 8.6 MG/1
2 TABLET ORAL AT BEDTIME
Refills: 0 | Status: DISCONTINUED | OUTPATIENT
Start: 2021-11-19 | End: 2021-11-23

## 2021-11-19 RX ORDER — RISPERIDONE 4 MG/1
1 TABLET ORAL
Refills: 0 | Status: DISCONTINUED | OUTPATIENT
Start: 2021-11-19 | End: 2021-11-23

## 2021-11-19 RX ORDER — HEPARIN SODIUM 5000 [USP'U]/ML
1 INJECTION INTRAVENOUS; SUBCUTANEOUS
Qty: 0 | Refills: 0 | DISCHARGE

## 2021-11-19 RX ORDER — HYDRALAZINE HCL 50 MG
25 TABLET ORAL THREE TIMES A DAY
Refills: 0 | Status: DISCONTINUED | OUTPATIENT
Start: 2021-11-19 | End: 2021-11-20

## 2021-11-19 RX ORDER — RISPERIDONE 4 MG/1
1 TABLET ORAL
Qty: 0 | Refills: 0 | DISCHARGE

## 2021-11-19 RX ORDER — BETHANECHOL CHLORIDE 25 MG
1 TABLET ORAL
Qty: 0 | Refills: 0 | DISCHARGE

## 2021-11-19 RX ORDER — LANOLIN ALCOHOL/MO/W.PET/CERES
5 CREAM (GRAM) TOPICAL AT BEDTIME
Refills: 0 | Status: DISCONTINUED | OUTPATIENT
Start: 2021-11-19 | End: 2021-11-23

## 2021-11-19 RX ORDER — LORATADINE 10 MG/1
10 TABLET ORAL DAILY
Refills: 0 | Status: DISCONTINUED | OUTPATIENT
Start: 2021-11-19 | End: 2021-11-23

## 2021-11-19 RX ORDER — SACCHAROMYCES BOULARDII 250 MG
1 POWDER IN PACKET (EA) ORAL
Qty: 0 | Refills: 0 | DISCHARGE

## 2021-11-19 RX ORDER — DOCUSATE SODIUM 100 MG
1 CAPSULE ORAL
Qty: 0 | Refills: 0 | DISCHARGE

## 2021-11-19 RX ORDER — ALBUTEROL 90 UG/1
2 AEROSOL, METERED ORAL EVERY 6 HOURS
Refills: 0 | Status: DISCONTINUED | OUTPATIENT
Start: 2021-11-19 | End: 2021-11-23

## 2021-11-19 RX ORDER — SENNA PLUS 8.6 MG/1
1 TABLET ORAL
Qty: 0 | Refills: 0 | DISCHARGE

## 2021-11-19 RX ORDER — ASPIRIN/CALCIUM CARB/MAGNESIUM 324 MG
1 TABLET ORAL
Qty: 0 | Refills: 0 | DISCHARGE

## 2021-11-19 RX ORDER — INFLUENZA VIRUS VACCINE 15; 15; 15; 15 UG/.5ML; UG/.5ML; UG/.5ML; UG/.5ML
0.7 SUSPENSION INTRAMUSCULAR ONCE
Refills: 0 | Status: DISCONTINUED | OUTPATIENT
Start: 2021-11-19 | End: 2021-11-23

## 2021-11-19 RX ADMIN — Medication 250 MILLIGRAM(S): at 06:46

## 2021-11-19 RX ADMIN — GABAPENTIN 400 MILLIGRAM(S): 400 CAPSULE ORAL at 21:32

## 2021-11-19 RX ADMIN — Medication 5 MILLIGRAM(S): at 21:31

## 2021-11-19 RX ADMIN — Medication 10 MILLIGRAM(S): at 06:46

## 2021-11-19 RX ADMIN — Medication 10 MILLIGRAM(S): at 21:37

## 2021-11-19 RX ADMIN — Medication 25 MILLIGRAM(S): at 14:40

## 2021-11-19 RX ADMIN — Medication 10 MILLIGRAM(S): at 13:17

## 2021-11-19 RX ADMIN — Medication 25 MILLIGRAM(S): at 06:47

## 2021-11-19 RX ADMIN — RISPERIDONE 1 MILLIGRAM(S): 4 TABLET ORAL at 19:32

## 2021-11-19 RX ADMIN — LORATADINE 10 MILLIGRAM(S): 10 TABLET ORAL at 13:17

## 2021-11-19 RX ADMIN — Medication 25 MILLIGRAM(S): at 21:32

## 2021-11-19 RX ADMIN — Medication 250 MILLIGRAM(S): at 19:31

## 2021-11-19 RX ADMIN — GABAPENTIN 300 MILLIGRAM(S): 400 CAPSULE ORAL at 08:42

## 2021-11-19 RX ADMIN — RISPERIDONE 1 MILLIGRAM(S): 4 TABLET ORAL at 06:46

## 2021-11-19 RX ADMIN — PANTOPRAZOLE SODIUM 10 MG/HR: 20 TABLET, DELAYED RELEASE ORAL at 08:34

## 2021-11-19 RX ADMIN — SENNA PLUS 2 TABLET(S): 8.6 TABLET ORAL at 21:31

## 2021-11-19 RX ADMIN — Medication 1 MILLIGRAM(S): at 06:48

## 2021-11-19 RX ADMIN — PANTOPRAZOLE SODIUM 10 MG/HR: 20 TABLET, DELAYED RELEASE ORAL at 20:31

## 2021-11-19 RX ADMIN — PANTOPRAZOLE SODIUM 10 MG/HR: 20 TABLET, DELAYED RELEASE ORAL at 14:40

## 2021-11-19 NOTE — H&P ADULT - NSHPLABSRESULTS_GEN_ALL_CORE
LABS:  cret                        13.1   7.85  )-----------( 285      ( 18 Nov 2021 21:40 )             41.1     11-18    134<L>  |  101  |  24<H>  ----------------------------<  122<H>  TNP   |  20  |  0.8    Ca    9.2      18 Nov 2021 21:40    TPro  7.9  /  Alb  3.7  /  TBili  0.3  /  DBili  0.2  /  AST  119<H>  /  ALT  29  /  AlkPhos  91  11-18    PT/INR - ( 18 Nov 2021 21:40 )   PT: 12.30 sec;   INR: 1.07 ratio         PTT - ( 18 Nov 2021 21:40 )  PTT:24.0 sec

## 2021-11-19 NOTE — DISCHARGE NOTE PROVIDER - CARE PROVIDER_API CALL
Ricci Castillo ()  Internal Medicine  [address] [place] [address]  Phone: [phone]  Fax: [phone]  Follow Up Time:

## 2021-11-19 NOTE — ED PROVIDER NOTE - PHYSICAL EXAMINATION
Physical Exam    Vital Signs: I have reviewed the initial vital signs.  Constitutional: well-nourished, appears stated age, no acute distress  Eyes: Conjunctiva pink, Sclera clear  Cardiovascular: S1 and S2, regular rate, regular rhythm, well-perfused extremities, radial pulses equal and 2+  Respiratory: unlabored respiratory effort, clear to auscultation bilaterally no wheezing, rales and rhonchi  Gastrointestinal: soft, non-tender abdomen, no pulsatile mass, normal bowl sounds  Rectal: black stool per rectum.   Musculoskeletal: supple neck, no lower extremity edema, no midline tenderness  Integumentary: warm, dry, no rash  Neurologic: min response with tactile stim.

## 2021-11-19 NOTE — H&P ADULT - HISTORY OF PRESENT ILLNESS
70F with PMH of Alzheimer's dementia, HTN, seizure disorder, NPH s/p drainage, s/p PEG/trach, recurrent UTIs presents from nursing home after she was found to have coffee ground emesis suctioned from trach over past 2 days.    in the ED, pt's VS: T(C): 37.1 (11-19-21 @ 02:24), Max: 37.1 (11-19-21 @ 02:24)  HR: 89 (11-19-21 @ 02:24) (89 - 108)  BP: 119/74 (11-19-21 @ 02:24) (119/74 - 152/100)  RR: 23 (11-19-21 @ 02:24) (20 - 23)  SpO2: 100% (11-19-21 @ 02:24) (97% - 100%)     Vitals stable  Pt is admitted for  70F with PMH of Alzheimer's dementia, HTN, seizure disorder, NPH s/p drainage, s/p PEG/trach, recurrent UTIs brought from nursing home after they suctioned material that they described as "coffee ground emesis" from the trach over the past 2 days. Unable to obtain history from the patient     in the ED, pt's VS: T(C): 37.1 (11-19-21 @ 02:24), Max: 37.1 (11-19-21 @ 02:24)  HR: 89 (11-19-21 @ 02:24) (89 - 108)  BP: 119/74 (11-19-21 @ 02:24) (119/74 - 152/100)  RR: 23 (11-19-21 @ 02:24) (20 - 23)  SpO2: 100% (11-19-21 @ 02:24) (97% - 100%)     Vitals stable  Pt is admitted for  70F with PMH of Alzheimer's dementia, HTN, seizure disorder, NPH s/p drainage, s/p PEG/trach, recurrent UTIs brought from nursing home after they suctioned material that they described as "coffee ground emesis" from the trach over the past 2 days. Unable to obtain history from the patient due to mental status, unable to reach nursing home staff at this time for collateral history. Per ED note, deny fever or vomiting.    in the ED, pt's VS: T(C): 37.1 (11-19-21 @ 02:24), Max: 37.1 (11-19-21 @ 02:24)  HR: 89 (11-19-21 @ 02:24) (89 - 108)  BP: 119/74 (11-19-21 @ 02:24) (119/74 - 152/100)  RR: 23 (11-19-21 @ 02:24) (20 - 23)  SpO2: 100% (11-19-21 @ 02:24) (97% - 100%)     In the ED,  70F with PMH of Alzheimer's dementia, HTN, seizure disorder, NPH s/p drainage, s/p PEG/trach, recurrent UTIs brought from nursing home after they suctioned material that they described as "coffee ground emesis" from the trach over the past 2 days. Unable to obtain history from the patient due to mental status, unable to reach nursing home staff at this time for collateral history. Per ED note, deny fever or vomiting.    in the ED, pt's VS: T(C): 37.1 (11-19-21 @ 02:24), Max: 37.1 (11-19-21 @ 02:24)  HR: 89 (11-19-21 @ 02:24) (89 - 108)  BP: 119/74 (11-19-21 @ 02:24) (119/74 - 152/100)  RR: 23 (11-19-21 @ 02:24) (20 - 23)  SpO2: 100% (11-19-21 @ 02:24) (97% - 100%)     In the ED, patient was vitally stable. PEG lavaged and aspirated, clear fluid came back. Trach suctioned with no return seen.

## 2021-11-19 NOTE — ED PROVIDER NOTE - CADM POA URETHRAL CATHETER
Patient Name: Immanuel Childers  Procedure Date: 1/10/2019 1:39 PM  MRN: [id number]  Account Number: [id number]  YOB: 1957  Admit Type: Outpatient  Age: 61  Gender: Male  Race: White  Attending MD: Cristiano White MD  Grafts or Implants: None  Procedure:            Colonoscopy  Indications:          Follow-up of diverticulitis  Providers:            Cristiano White MD  Sedation:             Propofol per Anesthesia  Procedure:       Pre-Anesthesia Assessment:       - Prior to the procedure, a History and Physical was performed, and       patient medications and allergies were reviewed. The patient's tolerance       of previous anesthesia was also reviewed. The risks and benefits of the       procedure and the sedation options and risks were discussed with the       patient. All questions were answered, and informed consent was obtained.       Prior Anticoagulants: The patient has taken no previous anticoagulant or       antiplatelet agents. ASA Grade Assessment: II - A patient with mild       systemic disease. After reviewing the risks and benefits, the patient       was deemed in satisfactory condition to undergo the procedure.       A History and Physical was performed prior to the procedure. Patient       medications and allergies were reviewed. The risks and benefits of the       procedure and sedation options were discussed. Questions were answered       and informed consent was obtained. Patient identification and proposed       procedure were verified. The patient was deemed in satisfactory       condition to undergo the procedure. The heart rate, respiratory rate,       oxygen saturations, blood pressure and response to sedation were       monitored throughout the procedure.       The endoscope was passed under direct vision. The Colonoscope was       introduced through the anus and advanced to the terminal ileum. The       physical status of the patient was re-assessed after the procedure. The        colonoscopy was performed without difficulty. The patient tolerated the       procedure well. The quality of the bowel preparation was good.  Scope Withdrawal Time 0 hours 10 minutes 16 seconds  Findings:       The perianal and digital rectal examinations were normal.       Two sessile polyps were found in the transverse colon. The polyps were 3       to 4 mm in size. These polyps were removed with a cold snare. Resection       and retrieval were complete. Verification of patient identification for       the specimen was done by the physician, nurse and technician. Estimated       blood loss was minimal.       A 4 mm polyp was found in the sigmoid colon. The polyp was sessile. The       polyp was removed with a cold snare. Resection and retrieval were       complete. Verification of patient identification for the specimen was       done by the physician, nurse and technician. Estimated blood loss was       minimal.       The terminal ileum appeared normal.       Multiple small and large-mouthed diverticula were found in the sigmoid       colon. There was no evidence of diverticular bleeding.       No additional abnormalities were found on retroflexion.  Impression:       - Two 3 to 4 mm polyps in the transverse colon, removed with a cold       snare. Resected and retrieved.       - One 4 mm polyp in the sigmoid colon, removed with a cold snare.       Resected and retrieved.       - The examined portion of the ileum was normal.       - Mild diverticulosis in the sigmoid colon. There was no evidence of       diverticular bleeding.  Recommendation:       - Discharge patient to home (ambulatory).                        - Resume previous diet.                        - Await pathology results.                        - Repeat colonoscopy [day] for surveillance based on                        pathology results.  Complications:        No immediate complications.  Estimated Blood Loss: Estimated blood loss was minimal.  Cristiano  MD Cindy  1/10/2019 2:35:21 PM  This report has been signed electronically by the above.  Number of Addenda: 0  Scope In: 2:18:45 PM  Scope Out: 2:30:35 PM       No Specimens removed during this procedure unless otherwise noted.     No

## 2021-11-19 NOTE — DISCHARGE NOTE PROVIDER - HOSPITAL COURSE
70F with PMH of Alzheimer's dementia, HTN, seizure disorder, NPH s/p drainage, s/p PEG/trach, recurrent UTIs brought from nursing home after they suctioned material that they described as "coffee ground emesis" from the trach over the past 2 days. Unable to obtain history from the patient due to mental status, unable to reach nursing home staff at this time for collateral history. Per ED note, deny fever or vomiting.    #Suspected bleeding vs coffee-ground emesis from trach  - Hgb and vitals stable, afebrile, CXR clear  - Unclear source of bleeding/secretions; not witnessed by me. Trach suctioned with no return, PEG lavaged with clear return  - Keep active T&S, maintain hgb >8  - Keep Protonix drip for now. If no evidence of GI bleed, dc drip  - If suspected bleeding of trach/pulmonary origin, consult surgery/pulm  - GI consult placed by ED    #Chronic respiratory failure s/p trach  - On MV    #HTN  - Cont hydralazine    #Agitation  - Cont Ativan 1 mg BID, 0.5 mg q6 PRN    surgery and gi were consulted no acute interventions trend cbc q12hrs stable discharged back to snf 70F with PMH of Alzheimer's dementia, HTN, seizure disorder, NPH s/p drainage, s/p PEG/trach, recurrent UTIs brought from nursing home after they suctioned material that they described as "coffee ground emesis" from the trach over the past 2 days. Unable to obtain history from the patient due to mental status, unable to reach nursing home staff at this time for collateral history. Per ED note, deny fever or vomiting.    #Suspected bleeding vs coffee-ground emesis from trach  - Hgb and vitals stable, afebrile, CXR clear  - Trach suctioned with no return, PEG lavaged with clear return  - surgery and gi were consulted no acute interventions     Patient is stable and amenable for discharge.          70F with PMH of Alzheimer's dementia, HTN, seizure disorder, NPH s/p drainage, s/p PEG/trach, recurrent UTIs brought from nursing home after they suctioned material that they described as "coffee ground emesis" from the trach over the past 2 days. Unable to obtain history from the patient due to mental status, unable to reach nursing home staff at this time for collateral history. Per ED note, deny fever or vomiting.    #Suspected bleeding vs coffee-ground emesis from trach  - Hgb and vitals stable, afebrile, CXR clear  - Trach suctioned with no return, PEG lavaged with clear return  - surgery and gi were consulted no acute interventions     Patient is stable and amenable for discharge.   Attending Attestation:  Patient was seen & examined independently. At least 10 systems were reviewed in ROS. All systems reviewed  are within normal limits. Latest vital signs and labs were reviewed today. Case was discussed with house staff in morning rounds for assessment and plan.  Patient is medically stable for discharge . About 34 mins spent on discharge disposition.        70F with PMH of Alzheimer's dementia, HTN, seizure disorder, NPH s/p drainage, s/p PEG/trach, recurrent UTIs brought from nursing home after they suctioned material that they described as "coffee ground emesis" from the trach over the past 2 days. Unable to obtain history from the patient due to mental status, unable to reach nursing home staff at this time for collateral history. Per ED note, deny fever or vomiting. Her stay was complicated by episodes of hypothermia.     For Suspected bleeding vs coffee-ground emesis from trach:  - Hgb and vitals stable, afebrile, CXR clear  - Trach suctioned with no return, PEG lavaged with clear return  - surgery and gi were consulted no acute interventions     For Hypothermia:  - Was put on warming blanket  - Temperature improved without blanket  - Fever workup showed UA positive  On discharge:  --> Vantin 100 mg every 12 hours for 5 days    Patient is stable and amenable for discharge.   Attending Attestation:  Patient was seen & examined independently. At least 10 systems were reviewed in ROS. All systems reviewed  are within normal limits. Latest vital signs and labs were reviewed today. Case was discussed with house staff in morning rounds for assessment and plan.  Patient is medically stable for discharge . About 34 mins spent on discharge disposition.

## 2021-11-19 NOTE — ED PROVIDER NOTE - CARE PLAN
1 Principal Discharge DX:	Dark stools   Principal Discharge DX:	Dark stools  Secondary Diagnosis:	Ventilator dependent

## 2021-11-19 NOTE — ED ADULT NURSE REASSESSMENT NOTE - NS ED NURSE REASSESS COMMENT FT1
called MD Fraser again to enter order for b/l wrist restraints as pt pulls on vent tubes. awaiting orders

## 2021-11-19 NOTE — ED PROVIDER NOTE - CLINICAL SUMMARY MEDICAL DECISION MAKING FREE TEXT BOX
Pt with coffee ground emesis thru trach and melena. Required labs, monitor. Plan is admission for further management.

## 2021-11-19 NOTE — DISCHARGE NOTE PROVIDER - NS AS DC PROVIDER CONTACT Y/N MULTI
Patient presents with persistent nausea and vomiting. Stage 4 Lung CA. He has had symptoms since Wednesday AM after his routine Q3day enema that he normally takes as part of his bowel regimen for Narcotic Induced Constipation. He received fluids yesterday at [facility], and felt OK. But today has not been able to keep anything down despite use of his prescribed oral anti-emetics. He denies any increase in pain.   Yes

## 2021-11-19 NOTE — CONSULT NOTE ADULT - ASSESSMENT
71y  with PMH of Alzheimer's dementia, HTN, seizure disorder, NPH s/p drainage, s/p PEG/trach, with concern for bloody drainage from Tracheostomy cannula.    -No apparent sign of bleeding currently  -Hgb stable  -Follow up GI consult  -No acute surgical intervention    WILL ADDEND NOTE WHEN FORMALLY STAFFED WITH ATTENDING 71y  with PMH of Alzheimer's dementia, HTN, seizure disorder, NPH s/p drainage, s/p PEG/trach, with concern for bloody drainage from Tracheostomy cannula.    -No apparent sign of bleeding currently  -Hgb stable  -Follow up GI consult  -No acute surgical intervention

## 2021-11-19 NOTE — CONSULT NOTE ADULT - ATTENDING COMMENTS
Patient seen and examined with surgery resident on floor 3E and discussed management plans with team. Consulted for possible bleed from tracheostomy site. Patient lethargic non verba with long standing Trach and PEG on vent with minimal vent settings. Trachea suctioned via cath no blood seen PEG site clean also. Medical team contacted to recall us back if any further evidence of bleeding. No intervention or study planned at this time.
Agree with supportive therapy.

## 2021-11-19 NOTE — ED PROVIDER NOTE - OBJECTIVE STATEMENT
70 yo female, pmh of dementia, trach, peg, htn, gi bleed in past still on heparin sub q inj for dvt ppx, presents to ed from [facility] for coffee ground emesis suction from trach over last two days. no reports of fever or vomiting.

## 2021-11-19 NOTE — H&P ADULT - ATTENDING COMMENTS
HPI:  70F with PMH of Alzheimer's dementia, HTN, seizure disorder, NPH s/p drainage, s/p PEG/trach, recurrent UTIs brought from nursing home after they suctioned material that they described as "coffee ground emesis" from the trach over the past 2 days. Unable to obtain history from the patient due to mental status, unable to reach nursing home staff at this time for collateral history. Per ED note, deny fever or vomiting.    in the ED, pt's VS: T(C): 37.1 (11-19-21 @ 02:24), Max: 37.1 (11-19-21 @ 02:24)  HR: 89 (11-19-21 @ 02:24) (89 - 108)  BP: 119/74 (11-19-21 @ 02:24) (119/74 - 152/100)  RR: 23 (11-19-21 @ 02:24) (20 - 23)  SpO2: 100% (11-19-21 @ 02:24) (97% - 100%)     In the ED, patient was vitally stable. PEG lavaged and aspirated, clear fluid came back. Trach suctioned with no return seen. (19 Nov 2021 02:52)    REVIEW OF SYSTEMS: see cc/HPI   CONSTITUTIONAL: No weakness, fevers or chills  EYES/ENT: No visual changes;  No vertigo or throat pain   NECK: No pain or stiffness, (+) trach  RESPIRATORY: No cough, wheezing, hemoptysis; (+) chronic vented   CARDIOVASCULAR: No chest pain or palpitations  GASTROINTESTINAL: No abdominal or epigastric pain. No nausea, vomiting, or hematemesis; No diarrhea or constipation. No melena or hematochezia.  GENITOURINARY: No dysuria, frequency or hematuria  NEUROLOGICAL: No numbness or weakness  SKIN: No itching, rashes    Physical Exam:  General: WN/WD NAD  Neurology: A&Ox3, nonfocal, follows commands  Eyes: PERRLA/ EOMI  ENT/Neck: Tracheostomy midline, No JVD  Respiratory: CTA B/L on Vent   CV: Normal rate regular rhythm, S1S2, no murmurs, rubs or gallops  Abdominal: Soft, NT, ND +BS, (+) PEG tube    Extremities: No edema, + peripheral pulses  Skin: No Rashes, Hematoma, Ecchymosis  Incisions: n/a  Tubes: n/a    A/p  suspected UGIB w/ aspiration vs. pulm hemorrhage   -GI eval   -serial CBC / Type and screen   -IV PPI     Chronic vent support / chronic resp. failure w/ coffee ground from the trach - suctioning in ED negative for blood   -Pulm eval   -admit to vent /floor status     HTN   -hydralazine     Agitation   - ativan     DVT prophylaxis

## 2021-11-19 NOTE — CONSULT NOTE ADULT - ASSESSMENT
70F with PMH of Alzheimer's dementia, HTN, seizure disorder, NPH s/p drainage, s/p PEG/trach, recurrent UTIs brought from nursing home after they suctioned material that they described as "coffee ground emesis" from the trach over the past 2 days. Unable to obtain history from the patient due to mental status, Per ED note, deny fever or vomiting.  patient is on ASA / PPI and H2 blocker     In the ED, patient was vitally stable. PEG lavaged and aspirated, clear fluid came back. Trach suctioned with no return seen.  CRISTAL dark green    *Coffee ground emesis  -HD stable  -Hb 13  -DNI/DNR   -discussed with , he will proceed with conservative management now since no signs of active bleeding    Plan  -PPI BID, can be po  -trend CBC  -active type and screen  -2 large bore IV  -feeding as tolerated    -call as needed, [phone] during weekdays till 5pm and call GI service after 5pm and on weekends [phone]  -Follow up with our [facility] located at [address] [place] [address]. Phone Number: [phone]

## 2021-11-19 NOTE — DISCHARGE NOTE PROVIDER - NSDCCPCAREPLAN_GEN_ALL_CORE_FT
PRINCIPAL DISCHARGE DIAGNOSIS  Diagnosis: Coffee ground emesis  Assessment and Plan of Treatment:   #Suspected bleeding vs coffee-ground emesis from trach  - Hgb and vitals stable, afebrile, CXR clear  - Trach suctioned with no return, PEG lavaged with clear return  - surgery and gi were consulted no acute interventions      SECONDARY DISCHARGE DIAGNOSES  Diagnosis: Ventilator dependent  Assessment and Plan of Treatment:      PRINCIPAL DISCHARGE DIAGNOSIS  Diagnosis: Coffee ground emesis  Assessment and Plan of Treatment: #Suspected bleeding vs coffee-ground emesis from trach  - Hgb and vitals stable, afebrile, CXR clear  - Trach suctioned with no return, PEG lavaged with clear return  - surgery and gi were consulted no acute interventions      SECONDARY DISCHARGE DIAGNOSES  Diagnosis: Ventilator dependent  Assessment and Plan of Treatment:     Diagnosis: Hypothermia, induced  Assessment and Plan of Treatment: For Hypothermia:  - Was put on warming blanket  - Temperature improved without blanket  - Fever workup showed UA positive  On discharge:  --> Vantin 100 mg every 12 hours for 5 days

## 2021-11-19 NOTE — H&P ADULT - NSHPPHYSICALEXAM_GEN_ALL_CORE
General: ill-appearing, trached on MV  Mental status: not following commands  Head:  Atraumatic, Normocephalic  Eyes: EOMI, conjunctiva and sclera clear  ENT: Moist mucous membranes  Neck: No JVD  Chest/lung: Clear to auscultation bilaterally; No wheezing or crackles  Heart: Regular rate and rhythm; No murmurs, rubs, or gallops  Abdomen: Bowel sounds present; Soft, Nontender, Nondistended  Extremities:  2+ Peripheral Pulses. No edema or cyanosis

## 2021-11-19 NOTE — CONSULT NOTE ADULT - SUBJECTIVE AND OBJECTIVE BOX
DAWN SALOMON [id number]  71y Female      HPI:  71F with PMH of Alzheimer's dementia, HTN, seizure disorder, NPH s/p drainage, s/p PEG/trach, recurrent UTIs brought from nursing home after they suctioned material that they described as "coffee ground emesis" from the trach over the past 2 days. Unable to obtain history from the patient due to mental status, unable to reach nursing home staff at this time for collateral history. Per ED note, deny fever or vomiting.    in the ED, pt's VS: T(C): 37.1 (11-19-21 @ 02:24), Max: 37.1 (11-19-21 @ 02:24)  HR: 89 (11-19-21 @ 02:24) (89 - 108)  BP: 119/74 (11-19-21 @ 02:24) (119/74 - 152/100)  RR: 23 (11-19-21 @ 02:24) (20 - 23)  SpO2: 100% (11-19-21 @ 02:24) (97% - 100%)     In the ED, patient was vitally stable. PEG lavaged and aspirated, clear fluid came back. Trach suctioned with no return seen. (19 Nov 2021 02:52)      PAST MEDICAL & SURGICAL HISTORY:  Alzheimer disease    HTN (hypertension)    Tracheostomy present    PEG (percutaneous endoscopic gastrostomy) status          MEDICATIONS  (STANDING):  bethanechol 10 milliGRAM(s) Oral three times a day  gabapentin 300 milliGRAM(s) Oral <User Schedule>  gabapentin 400 milliGRAM(s) Oral at bedtime  hydrALAZINE 25 milliGRAM(s) Oral three times a day  loratadine 10 milliGRAM(s) Oral daily  LORazepam     Tablet 1 milliGRAM(s) Oral two times a day  melatonin 5 milliGRAM(s) Oral at bedtime  pantoprazole Infusion 8 mG/Hr (10 mL/Hr) IV Continuous <Continuous>  risperiDONE   Tablet 1 milliGRAM(s) Oral two times a day  saccharomyces boulardii 250 milliGRAM(s) Oral two times a day  senna 2 Tablet(s) Oral at bedtime    MEDICATIONS  (PRN):  ALBUTerol    90 MICROgram(s) HFA Inhaler 2 Puff(s) Inhalation every 6 hours PRN Shortness of Breath and/or Wheezing  LORazepam     Tablet 0.5 milliGRAM(s) Oral every 6 hours PRN Agitation      Allergies    Cipro (Unknown)  Diflucan (Unknown)    Intolerances        REVIEW OF SYSTEMS    [ ] A ten-point review of systems was otherwise negative except as noted.  [x ] Due to altered mental status/intubation, subjective information were not able to be obtained from the patient. History was obtained, to the extent possible, from review of the chart and collateral sources of information.      Vital Signs Last 24 Hrs  T(C): 35.7 (19 Nov 2021 10:30), Max: 37.4 (19 Nov 2021 06:42)  T(F): 96.2 (19 Nov 2021 10:30), Max: 99.4 (19 Nov 2021 06:42)  HR: 84 (19 Nov 2021 10:30) (84 - 108)  BP: 121/76 (19 Nov 2021 10:30) (119/74 - 152/100)  BP(mean): --  RR: 20 (19 Nov 2021 10:30) (20 - 23)  SpO2: 99% (19 Nov 2021 07:57) (97% - 100%)    PHYSICAL EXAM: ***  GENERAL: NAD, well-appearing  CHEST/LUNG: Clear to auscultation bilaterally  HEART: Regular rate and rhythm  ABDOMEN: Soft, Nontender, Nondistended;   EXTREMITIES:  No clubbing, cyanosis, or edema      LABS:  Labs:  CAPILLARY BLOOD GLUCOSE                              13.1   7.85  )-----------( 285      ( 18 Nov 2021 21:40 )             41.1       Auto Neutrophil %: 72.8 % (11-18-21 @ 21:40)  Auto Immature Granulocyte %: 0.4 % (11-18-21 @ 21:40)    11-18    134<L>  |  101  |  24<H>  ----------------------------<  122<H>  TNP   |  20  |  0.8      Calcium, Total Serum: 9.2 mg/dL (11-18-21 @ 21:40)      LFTs:             7.9  | 0.3  | 119      ------------------[91      ( 18 Nov 2021 21:40 )  3.7  | 0.2  | 29          Lipase:x      Amylase:x         Blood Gas Venous - Lactate: 0.80 mmol/L (11-19-21 @ 02:50)  Lactate, Blood: 0.7 mmol/L (11-18-21 @ 21:40)      Coags:     12.30  ----< 1.07    ( 18 Nov 2021 21:40 )     24.0        Mode: AC/ CMV (Assist Control/ Continuous Mandatory Ventilation)  RR (machine): 14  TV (machine): 400  FiO2: 40  PEEP: 5  ITime: 1  MAP: 17  PIP: 29                  RADIOLOGY & ADDITIONAL STUDIES:  < from: Xray Chest 1 View- PORTABLE-Urgent (Xray Chest 1 View- PORTABLE-Urgent .) (04.21.21 @ 14:26) >  Findings:    Support devices: Tracheostomy tube in place    Cardiac/mediastinum/hilum: Unchanged    Lung parenchyma/Pleura: Bilateral opacities more extensive on the left, similar to prior. No pneumothorax.    Skeleton/soft tissues: Unchanged    Impression:    Bilateral opacities more extensive on the left, similar to prior.    < end of copied text >   DAWN SALOMON [id number]  71y Female      HPI:  71F with PMH of Alzheimer's dementia, HTN, seizure disorder, NPH s/p drainage, s/p PEG/trach, recurrent UTIs brought from nursing home after they suctioned material that they described as "coffee ground emesis" from the trach over the past 2 days. Unable to obtain history from the patient due to mental status, unable to reach nursing home staff at this time for collateral history. Per ED note, deny fever or vomiting.    in the ED, pt's VS: T(C): 37.1 (11-19-21 @ 02:24), Max: 37.1 (11-19-21 @ 02:24)  HR: 89 (11-19-21 @ 02:24) (89 - 108)  BP: 119/74 (11-19-21 @ 02:24) (119/74 - 152/100)  RR: 23 (11-19-21 @ 02:24) (20 - 23)  SpO2: 100% (11-19-21 @ 02:24) (97% - 100%)     In the ED, patient was vitally stable. PEG lavaged and aspirated, clear fluid came back. Trach suctioned with no return seen. (19 Nov 2021 02:52)      PAST MEDICAL & SURGICAL HISTORY:  Alzheimer disease    HTN (hypertension)    Tracheostomy present    PEG (percutaneous endoscopic gastrostomy) status          MEDICATIONS  (STANDING):  bethanechol 10 milliGRAM(s) Oral three times a day  gabapentin 300 milliGRAM(s) Oral <User Schedule>  gabapentin 400 milliGRAM(s) Oral at bedtime  hydrALAZINE 25 milliGRAM(s) Oral three times a day  loratadine 10 milliGRAM(s) Oral daily  LORazepam     Tablet 1 milliGRAM(s) Oral two times a day  melatonin 5 milliGRAM(s) Oral at bedtime  pantoprazole Infusion 8 mG/Hr (10 mL/Hr) IV Continuous <Continuous>  risperiDONE   Tablet 1 milliGRAM(s) Oral two times a day  saccharomyces boulardii 250 milliGRAM(s) Oral two times a day  senna 2 Tablet(s) Oral at bedtime    MEDICATIONS  (PRN):  ALBUTerol    90 MICROgram(s) HFA Inhaler 2 Puff(s) Inhalation every 6 hours PRN Shortness of Breath and/or Wheezing  LORazepam     Tablet 0.5 milliGRAM(s) Oral every 6 hours PRN Agitation      Allergies    Cipro (Unknown)  Diflucan (Unknown)    Intolerances        REVIEW OF SYSTEMS    [ ] A ten-point review of systems was otherwise negative except as noted.  [x ] Due to altered mental status/intubation, subjective information were not able to be obtained from the patient. History was obtained, to the extent possible, from review of the chart and collateral sources of information.      Vital Signs Last 24 Hrs  T(C): 35.7 (19 Nov 2021 10:30), Max: 37.4 (19 Nov 2021 06:42)  T(F): 96.2 (19 Nov 2021 10:30), Max: 99.4 (19 Nov 2021 06:42)  HR: 84 (19 Nov 2021 10:30) (84 - 108)  BP: 121/76 (19 Nov 2021 10:30) (119/74 - 152/100)  BP(mean): --  RR: 20 (19 Nov 2021 10:30) (20 - 23)  SpO2: 99% (19 Nov 2021 07:57) (97% - 100%)    PHYSICAL EXAM:   GENERAL: NAD, Lethargic, Tracheostomy, on ventilator  Neck: Tracheostomy site well healed. Size 8 Shiley in place, no sign of current or recent bleeding  CHEST/LUNG: Clear to auscultation bilaterally  HEART: Regular rate and rhythm by radial pulse  ABDOMEN: Soft, Nontender, Nondistended; PEG site with no sign of bleeding  EXTREMITIES:  No clubbing, cyanosis, or edema      LABS:  Labs:  CAPILLARY BLOOD GLUCOSE                              13.1   7.85  )-----------( 285      ( 18 Nov 2021 21:40 )             41.1       Auto Neutrophil %: 72.8 % (11-18-21 @ 21:40)  Auto Immature Granulocyte %: 0.4 % (11-18-21 @ 21:40)    11-18    134<L>  |  101  |  24<H>  ----------------------------<  122<H>  TNP   |  20  |  0.8      Calcium, Total Serum: 9.2 mg/dL (11-18-21 @ 21:40)      LFTs:             7.9  | 0.3  | 119      ------------------[91      ( 18 Nov 2021 21:40 )  3.7  | 0.2  | 29          Lipase:x      Amylase:x         Blood Gas Venous - Lactate: 0.80 mmol/L (11-19-21 @ 02:50)  Lactate, Blood: 0.7 mmol/L (11-18-21 @ 21:40)      Coags:     12.30  ----< 1.07    ( 18 Nov 2021 21:40 )     24.0        Mode: AC/ CMV (Assist Control/ Continuous Mandatory Ventilation)  RR (machine): 14  TV (machine): 400  FiO2: 40  PEEP: 5  ITime: 1  MAP: 17  PIP: 29                  RADIOLOGY & ADDITIONAL STUDIES:  < from: Xray Chest 1 View- PORTABLE-Urgent (Xray Chest 1 View- PORTABLE-Urgent .) (04.21.21 @ 14:26) >  Findings:    Support devices: Tracheostomy tube in place    Cardiac/mediastinum/hilum: Unchanged    Lung parenchyma/Pleura: Bilateral opacities more extensive on the left, similar to prior. No pneumothorax.    Skeleton/soft tissues: Unchanged    Impression:    Bilateral opacities more extensive on the left, similar to prior.    < end of copied text >

## 2021-11-19 NOTE — CONSULT NOTE ADULT - SUBJECTIVE AND OBJECTIVE BOX
Gastroenterology Consultation:    Patient is a 71y old  Female who presents with a chief complaint of     Admitted on: 11-19-21  HPI:  70F with PMH of Alzheimer's dementia, HTN, seizure disorder, NPH s/p drainage, s/p PEG/trach, recurrent UTIs brought from nursing home after they suctioned material that they described as "coffee ground emesis" from the trach over the past 2 days. Unable to obtain history from the patient due to mental status, Per ED note, deny fever or vomiting.  patient is on ASA / PPI and H2 blocker     In the ED, patient was vitally stable. PEG lavaged and aspirated, clear fluid came back. Trach suctioned with no return seen.        Prior EGD:  Prior Colonoscopy:      PAST MEDICAL & SURGICAL HISTORY:  Alzheimer disease  HTN (hypertension)  Tracheostomy present  PEG (percutaneous endoscopic gastrostomy) status    FAMILY HISTORY:      Social History:  Tobacco:  Alcohol:  Drugs:    Home Medications:  Aspirin Enteric Coated 81 mg oral delayed release tablet: 1 tab(s) orally once a day (19 Nov 2021 04:26)  Ativan 0.5 mg oral tablet: 1 tab(s) orally every 6 hours, As Needed for agitation (19 Nov 2021 04:26)  Ativan 1 mg oral tablet: orally 2 times a day (19 Nov 2021 04:26)  bethanechol 10 mg oral tablet: 1 tab(s) orally 3 times a day (19 Nov 2021 04:26)  Colace 100 mg oral capsule: 1 cap(s) orally 2 times a day (19 Nov 2021 04:26)  Florastor 250 mg oral capsule: 1 cap(s) orally 2 times a day (19 Nov 2021 04:26)  gabapentin 300 mg oral capsule: 1 cap(s) orally once a day (19 Nov 2021 04:26)  gabapentin 400 mg oral capsule: 1 cap(s) orally once a day (19 Nov 2021 04:26)  heparin 5000 units/mL injectable solution: 1 milliliter(s) injectable 2 times a day (19 Nov 2021 04:26)  hydrALAZINE 25 mg oral tablet: 1 tab(s) orally 3 times a day (19 Nov 2021 04:26)  loratadine 10 mg oral tablet: 1 tab(s) orally once a day (19 Nov 2021 04:26)  Melatonin 5 mg oral tablet: 1 tab(s) orally once a day (at bedtime) (19 Nov 2021 04:26)  Multiple Vitamins with Minerals oral tablet: 1 tab(s) orally once a day (19 Nov 2021 04:26)  omeprazole 40 mg oral delayed release capsule: 1 cap(s) orally once a day (19 Nov 2021 04:26)  Pepcid 20 mg oral tablet: 0.5 tab(s) orally once a day (19 Nov 2021 04:26)  Retacrit 40,000 units/mL preservative-free injectable solution: 1 milliliter(s) injectable once a week (19 Nov 2021 04:26)  risperiDONE 1 mg oral tablet: 1 tab(s) orally 2 times a day (19 Nov 2021 04:26)  Senna 8.6 mg oral tablet: 1 tab(s) orally once a day (at bedtime) (19 Nov 2021 04:26)  tiZANidine 4 mg oral capsule: 1 cap(s) orally 2 times a day (19 Nov 2021 04:26)  Tylenol 325 mg oral capsule: 1 cap(s) orally 3 times a day (19 Nov 2021 04:26)  Ventolin HFA 90 mcg/inh inhalation aerosol: 2 puff(s) inhaled every 6 hours (19 Nov 2021 04:26)    MEDICATIONS  (STANDING):  bethanechol 10 milliGRAM(s) Oral three times a day  gabapentin 300 milliGRAM(s) Oral <User Schedule>  gabapentin 400 milliGRAM(s) Oral at bedtime  hydrALAZINE 25 milliGRAM(s) Oral three times a day  loratadine 10 milliGRAM(s) Oral daily  LORazepam     Tablet 1 milliGRAM(s) Oral two times a day  melatonin 5 milliGRAM(s) Oral at bedtime  pantoprazole Infusion 8 mG/Hr (10 mL/Hr) IV Continuous <Continuous>  risperiDONE   Tablet 1 milliGRAM(s) Oral two times a day  saccharomyces boulardii 250 milliGRAM(s) Oral two times a day  senna 2 Tablet(s) Oral at bedtime    MEDICATIONS  (PRN):  ALBUTerol    90 MICROgram(s) HFA Inhaler 2 Puff(s) Inhalation every 6 hours PRN Shortness of Breath and/or Wheezing  LORazepam     Tablet 0.5 milliGRAM(s) Oral every 6 hours PRN Agitation      Allergies  Cipro (Unknown)  Diflucan (Unknown)      Review of Systems:   Constitutional:  No Fever, No Chills  ENT/Mouth:  No Hearing Changes,  No Difficulty Swallowing  Eyes:  No Eye Pain, No Vision Changes  Cardiovascular:  No Chest Pain, No Palpitations  Respiratory:  No Cough, No Dyspnea  Gastrointestinal:  As described in HPI  Musculoskeletal:  No Joint Swelling, No Back Pain  Skin:  No Skin Lesions, No Jaundice  Neuro:  No Syncope, No Dizziness  Heme/Lymph:  No Bruising, No Bleeding.     Physical Examination:  T(C): 37.4 (11-19-21 @ 06:42), Max: 37.4 (11-19-21 @ 06:42)  HR: 97 (11-19-21 @ 06:42) (89 - 108)  BP: 129/76 (11-19-21 @ 06:42) (119/74 - 152/100)  RR: 23 (11-19-21 @ 06:42) (20 - 23)  SpO2: 100% (11-19-21 @ 06:42) (97% - 100%)    Weight (kg): 77.1 (11-18-21 @ 21:21)     Constitutional: No acute distress.  Eyes:. Conjunctivae are clear, Sclera is non-icteric.  Ears Nose and Throat: The external ears are normal appearing,  Oral mucosa is pink and moist.  Respiratory:  No signs of respiratory distress. Lung sounds are clear bilaterally.  Cardiovascular:  S1 S2, Regular rate and rhythm.  GI: Abdomen is soft, symmetric, and non-tender without distention. There are no visible lesions or scars. Bowel sounds are present and normoactive in all four quadrants. No masses, hepatomegaly, or splenomegaly are noted.   Neuro: No Tremor, No involuntary movements  Skin: No rashes, No Jaundice.    Data: (reviewed by attending)                        13.1   7.85  )-----------( 285      ( 18 Nov 2021 21:40 )             41.1     Hgb Trend:  13.1  11-18-21 @ 21:40      11-18    134<L>  |  101  |  24<H>  ----------------------------<  122<H>  TNP   |  20  |  0.8    Ca    9.2      18 Nov 2021 21:40    TPro  7.9  /  Alb  3.7  /  TBili  0.3  /  DBili  0.2  /  AST  119<H>  /  ALT  29  /  AlkPhos  91  11-18    Liver panel trend:  TBili 0.3   /      /   ALT 29   /   AlkP 91   /   Tptn 7.9   /   Alb 3.7    /   DBili 0.2      11-18      PT/INR - ( 18 Nov 2021 21:40 )   PT: 12.30 sec;   INR: 1.07 ratio         PTT - ( 18 Nov 2021 21:40 )  PTT:24.0 sec        Radiology:(reviewed by attending)       Gastroenterology Consultation:    Patient is a 71y old  Female who presents with a chief complaint of     Admitted on: 11-19-21  HPI:  70F with PMH of Alzheimer's dementia, HTN, seizure disorder, NPH s/p drainage, s/p PEG/trach, recurrent UTIs brought from nursing home after they suctioned material that they described as "coffee ground emesis" from the trach over the past 2 days. Unable to obtain history from the patient due to mental status, Per ED note, deny fever or vomiting.  patient is on ASA / PPI and H2 blocker     In the ED, patient was vitally stable. PEG lavaged and aspirated, clear fluid came back. Trach suctioned with no return seen.        Prior EGD: none on chart   Prior Colonoscopy: none on chart       PAST MEDICAL & SURGICAL HISTORY:  Alzheimer disease  HTN (hypertension)  Tracheostomy present  PEG (percutaneous endoscopic gastrostomy) status    FAMILY HISTORY: unable to obtain       Social History:  Tobacco: N  Alcohol: N  Drugs: N    Home Medications:  Aspirin Enteric Coated 81 mg oral delayed release tablet: 1 tab(s) orally once a day (19 Nov 2021 04:26)  Ativan 0.5 mg oral tablet: 1 tab(s) orally every 6 hours, As Needed for agitation (19 Nov 2021 04:26)  Ativan 1 mg oral tablet: orally 2 times a day (19 Nov 2021 04:26)  bethanechol 10 mg oral tablet: 1 tab(s) orally 3 times a day (19 Nov 2021 04:26)  Colace 100 mg oral capsule: 1 cap(s) orally 2 times a day (19 Nov 2021 04:26)  Florastor 250 mg oral capsule: 1 cap(s) orally 2 times a day (19 Nov 2021 04:26)  gabapentin 300 mg oral capsule: 1 cap(s) orally once a day (19 Nov 2021 04:26)  gabapentin 400 mg oral capsule: 1 cap(s) orally once a day (19 Nov 2021 04:26)  heparin 5000 units/mL injectable solution: 1 milliliter(s) injectable 2 times a day (19 Nov 2021 04:26)  hydrALAZINE 25 mg oral tablet: 1 tab(s) orally 3 times a day (19 Nov 2021 04:26)  loratadine 10 mg oral tablet: 1 tab(s) orally once a day (19 Nov 2021 04:26)  Melatonin 5 mg oral tablet: 1 tab(s) orally once a day (at bedtime) (19 Nov 2021 04:26)  Multiple Vitamins with Minerals oral tablet: 1 tab(s) orally once a day (19 Nov 2021 04:26)  omeprazole 40 mg oral delayed release capsule: 1 cap(s) orally once a day (19 Nov 2021 04:26)  Pepcid 20 mg oral tablet: 0.5 tab(s) orally once a day (19 Nov 2021 04:26)  Retacrit 40,000 units/mL preservative-free injectable solution: 1 milliliter(s) injectable once a week (19 Nov 2021 04:26)  risperiDONE 1 mg oral tablet: 1 tab(s) orally 2 times a day (19 Nov 2021 04:26)  Senna 8.6 mg oral tablet: 1 tab(s) orally once a day (at bedtime) (19 Nov 2021 04:26)  tiZANidine 4 mg oral capsule: 1 cap(s) orally 2 times a day (19 Nov 2021 04:26)  Tylenol 325 mg oral capsule: 1 cap(s) orally 3 times a day (19 Nov 2021 04:26)  Ventolin HFA 90 mcg/inh inhalation aerosol: 2 puff(s) inhaled every 6 hours (19 Nov 2021 04:26)    MEDICATIONS  (STANDING):  bethanechol 10 milliGRAM(s) Oral three times a day  gabapentin 300 milliGRAM(s) Oral <User Schedule>  gabapentin 400 milliGRAM(s) Oral at bedtime  hydrALAZINE 25 milliGRAM(s) Oral three times a day  loratadine 10 milliGRAM(s) Oral daily  LORazepam     Tablet 1 milliGRAM(s) Oral two times a day  melatonin 5 milliGRAM(s) Oral at bedtime  pantoprazole Infusion 8 mG/Hr (10 mL/Hr) IV Continuous <Continuous>  risperiDONE   Tablet 1 milliGRAM(s) Oral two times a day  saccharomyces boulardii 250 milliGRAM(s) Oral two times a day  senna 2 Tablet(s) Oral at bedtime    MEDICATIONS  (PRN):  ALBUTerol    90 MICROgram(s) HFA Inhaler 2 Puff(s) Inhalation every 6 hours PRN Shortness of Breath and/or Wheezing  LORazepam     Tablet 0.5 milliGRAM(s) Oral every 6 hours PRN Agitation      Allergies  Cipro (Unknown)  Diflucan (Unknown)      Review of Systems:   Constitutional:  No Fever, No Chills  ENT/Mouth:  No Hearing Changes,  No Difficulty Swallowing  Eyes:  No Eye Pain, No Vision Changes  Cardiovascular:  No Chest Pain, No Palpitations  Respiratory:  No Cough, No Dyspnea  Gastrointestinal:  As described in HPI  Musculoskeletal:  No Joint Swelling, No Back Pain  Skin:  No Skin Lesions, No Jaundice  Neuro:  No Syncope, No Dizziness  Heme/Lymph:  No Bruising, No Bleeding.     Physical Examination:  T(C): 37.4 (11-19-21 @ 06:42), Max: 37.4 (11-19-21 @ 06:42)  HR: 97 (11-19-21 @ 06:42) (89 - 108)  BP: 129/76 (11-19-21 @ 06:42) (119/74 - 152/100)  RR: 23 (11-19-21 @ 06:42) (20 - 23)  SpO2: 100% (11-19-21 @ 06:42) (97% - 100%)    Weight (kg): 77.1 (11-18-21 @ 21:21)     Constitutional: No acute distress.  Eyes:. Conjunctivae are clear, Sclera is non-icteric.  Ears Nose and Throat: The external ears are normal appearing,  Oral mucosa is pink and moist.  Respiratory:  No signs of respiratory distress. Lung sounds are clear bilaterally.  Cardiovascular:  S1 S2, Regular rate and rhythm.  GI: Abdomen is soft, symmetric, and non-tender without distention. There are no visible lesions or scars. Bowel sounds are present and normoactive in all four quadrants. No masses, hepatomegaly, or splenomegaly are noted.   Neuro: No Tremor, No involuntary movements  Skin: No rashes, No Jaundice.    Data: (reviewed by attending)                        13.1   7.85  )-----------( 285      ( 18 Nov 2021 21:40 )             41.1     Hgb Trend:  13.1  11-18-21 @ 21:40      11-18    134<L>  |  101  |  24<H>  ----------------------------<  122<H>  TNP   |  20  |  0.8    Ca    9.2      18 Nov 2021 21:40    TPro  7.9  /  Alb  3.7  /  TBili  0.3  /  DBili  0.2  /  AST  119<H>  /  ALT  29  /  AlkPhos  91  11-18    Liver panel trend:  TBili 0.3   /      /   ALT 29   /   AlkP 91   /   Tptn 7.9   /   Alb 3.7    /   DBili 0.2      11-18      PT/INR - ( 18 Nov 2021 21:40 )   PT: 12.30 sec;   INR: 1.07 ratio         PTT - ( 18 Nov 2021 21:40 )  PTT:24.0 sec        Radiology:(reviewed by attending)       Gastroenterology Consultation:    Patient is a 71y old  Female who presents with a chief complaint of     Admitted on: 11-19-21  HPI:  70F with PMH of Alzheimer's dementia, HTN, seizure disorder, NPH s/p drainage, s/p PEG/trach, recurrent UTIs brought from nursing home after they suctioned material that they described as "coffee ground emesis" from the trach over the past 2 days. Unable to obtain history from the patient due to mental status, Per ED note, deny fever or vomiting.  patient is on ASA / PPI and H2 blocker     In the ED, patient was vitally stable. PEG lavaged and aspirated, clear fluid came back. Trach suctioned with no return seen.      Prior EGD: none on chart   Prior Colonoscopy: none on chart       PAST MEDICAL & SURGICAL HISTORY:  Alzheimer disease  HTN (hypertension)  Tracheostomy present  PEG (percutaneous endoscopic gastrostomy) status    FAMILY HISTORY: unable to obtain       Social History:  Tobacco: N  Alcohol: N  Drugs: N    Home Medications:  Aspirin Enteric Coated 81 mg oral delayed release tablet: 1 tab(s) orally once a day (19 Nov 2021 04:26)  Ativan 0.5 mg oral tablet: 1 tab(s) orally every 6 hours, As Needed for agitation (19 Nov 2021 04:26)  Ativan 1 mg oral tablet: orally 2 times a day (19 Nov 2021 04:26)  bethanechol 10 mg oral tablet: 1 tab(s) orally 3 times a day (19 Nov 2021 04:26)  Colace 100 mg oral capsule: 1 cap(s) orally 2 times a day (19 Nov 2021 04:26)  Florastor 250 mg oral capsule: 1 cap(s) orally 2 times a day (19 Nov 2021 04:26)  gabapentin 300 mg oral capsule: 1 cap(s) orally once a day (19 Nov 2021 04:26)  gabapentin 400 mg oral capsule: 1 cap(s) orally once a day (19 Nov 2021 04:26)  heparin 5000 units/mL injectable solution: 1 milliliter(s) injectable 2 times a day (19 Nov 2021 04:26)  hydrALAZINE 25 mg oral tablet: 1 tab(s) orally 3 times a day (19 Nov 2021 04:26)  loratadine 10 mg oral tablet: 1 tab(s) orally once a day (19 Nov 2021 04:26)  Melatonin 5 mg oral tablet: 1 tab(s) orally once a day (at bedtime) (19 Nov 2021 04:26)  Multiple Vitamins with Minerals oral tablet: 1 tab(s) orally once a day (19 Nov 2021 04:26)  omeprazole 40 mg oral delayed release capsule: 1 cap(s) orally once a day (19 Nov 2021 04:26)  Pepcid 20 mg oral tablet: 0.5 tab(s) orally once a day (19 Nov 2021 04:26)  Retacrit 40,000 units/mL preservative-free injectable solution: 1 milliliter(s) injectable once a week (19 Nov 2021 04:26)  risperiDONE 1 mg oral tablet: 1 tab(s) orally 2 times a day (19 Nov 2021 04:26)  Senna 8.6 mg oral tablet: 1 tab(s) orally once a day (at bedtime) (19 Nov 2021 04:26)  tiZANidine 4 mg oral capsule: 1 cap(s) orally 2 times a day (19 Nov 2021 04:26)  Tylenol 325 mg oral capsule: 1 cap(s) orally 3 times a day (19 Nov 2021 04:26)  Ventolin HFA 90 mcg/inh inhalation aerosol: 2 puff(s) inhaled every 6 hours (19 Nov 2021 04:26)    MEDICATIONS  (STANDING):  bethanechol 10 milliGRAM(s) Oral three times a day  gabapentin 300 milliGRAM(s) Oral <User Schedule>  gabapentin 400 milliGRAM(s) Oral at bedtime  hydrALAZINE 25 milliGRAM(s) Oral three times a day  loratadine 10 milliGRAM(s) Oral daily  LORazepam     Tablet 1 milliGRAM(s) Oral two times a day  melatonin 5 milliGRAM(s) Oral at bedtime  pantoprazole Infusion 8 mG/Hr (10 mL/Hr) IV Continuous <Continuous>  risperiDONE   Tablet 1 milliGRAM(s) Oral two times a day  saccharomyces boulardii 250 milliGRAM(s) Oral two times a day  senna 2 Tablet(s) Oral at bedtime    MEDICATIONS  (PRN):  ALBUTerol    90 MICROgram(s) HFA Inhaler 2 Puff(s) Inhalation every 6 hours PRN Shortness of Breath and/or Wheezing  LORazepam     Tablet 0.5 milliGRAM(s) Oral every 6 hours PRN Agitation      Allergies  Cipro (Unknown)  Diflucan (Unknown)      Review of Systems:   limited, patient is non verbal      Physical Examination:  T(C): 37.4 (11-19-21 @ 06:42), Max: 37.4 (11-19-21 @ 06:42)  HR: 97 (11-19-21 @ 06:42) (89 - 108)  BP: 129/76 (11-19-21 @ 06:42) (119/74 - 152/100)  RR: 23 (11-19-21 @ 06:42) (20 - 23)  SpO2: 100% (11-19-21 @ 06:42) (97% - 100%)    Weight (kg): 77.1 (11-18-21 @ 21:21)     HEENT: trach   Respiratory:  mechanical ventilation   Cardiovascular:  S1 S2, Regular rate and rhythm.  GI: Abdomen is soft, symmetric, and non-tender without distention. PEG lavage yellow liquid. CRISTAL dark green   Skin: No Jaundice.    Data: (reviewed by attending)                        13.1   7.85  )-----------( 285      ( 18 Nov 2021 21:40 )             41.1     Hgb Trend:  13.1  11-18-21 @ 21:40      11-18    134<L>  |  101  |  24<H>  ----------------------------<  122<H>  TNP   |  20  |  0.8    Ca    9.2      18 Nov 2021 21:40    TPro  7.9  /  Alb  3.7  /  TBili  0.3  /  DBili  0.2  /  AST  119<H>  /  ALT  29  /  AlkPhos  91  11-18    Liver panel trend:  TBili 0.3   /      /   ALT 29   /   AlkP 91   /   Tptn 7.9   /   Alb 3.7    /   DBili 0.2      11-18      PT/INR - ( 18 Nov 2021 21:40 )   PT: 12.30 sec;   INR: 1.07 ratio         PTT - ( 18 Nov 2021 21:40 )  PTT:24.0 sec

## 2021-11-19 NOTE — H&P ADULT - ASSESSMENT
70F with PMH of Alzheimer's dementia, HTN, seizure disorder, NPH s/p drainage, s/p PEG/trach, recurrent UTIs presents from nursing home after she was found to have coffee ground emesis suctioned from trach over past 2 days.    #  - Hgb stable, vitals stable  - Keep active T&S, maintain hgb >8  - GI consult    #HTN    #Hx of seizure disorder    #Alzheimer's dementia    DVT Prophylaxis:  GI Prophylaxis:  Diet:  Activity:    Code Status:    Disposition:   70F with PMH of Alzheimer's dementia, HTN, seizure disorder, NPH s/p drainage, s/p PEG/trach, recurrent UTIs presents from nursing home after she was found to have coffee ground emesis suctioned from trach over past 2 days.    #Suspected   - Hgb stable, vitals stable  - Keep active T&S, maintain hgb >8  - GI consult    #HTN    #Hx of seizure disorder    #Alzheimer's dementia    DVT Prophylaxis:  GI Prophylaxis:  Diet:  Activity:    Code Status:    Disposition:   70F with PMH of Alzheimer's dementia, HTN, seizure disorder, NPH s/p drainage, s/p PEG/trach, recurrent UTIs presents from nursing home after she was found to have coffee ground emesis suctioned from trach over past 2 days.    #Suspected bleeding vs coffee-ground emesis from trach  - Hgb and vitals stable, afebrile, CXR clear  - Unclear source of bleeding/secretions; not witnessed by me. Trach suctioned with no return, PEG lavaged with clear return  - Keep active T&S, maintain hgb >8  - Keep Protonix drip for now. If no evidence of GI bleed, dc drip  - If suspected bleeding of trach/pulmonary origin, consult surgery/pulm  - GI consult placed by ED    #Chronic respiratory failure s/p trach  - On MV    #HTN  - Cont hydralazine    #Agitation  - Cont Ativan 1 mg BID, 0.5 mg q6 PRN    DVT Prophylaxis: Sequentials  GI Prophylaxis: Protonix  Diet: NPO for now  Activity: Bedrest    Code Status: DNR    Disposition: inpatient

## 2021-11-19 NOTE — ED ADULT NURSE REASSESSMENT NOTE - NS ED NURSE REASSESS COMMENT FT1
Pt endorsed by night RN. when it received she was pulling on the vent tubes, b/l wrist restrained applied, MD Fraser notified, asked to enter order. pt on vent, cardiac monitoring in progress, safety measures in place. skin intact, NAD noted. will continue to monitor

## 2021-11-19 NOTE — ED PROVIDER NOTE - ATTENDING CONTRIBUTION TO CARE
I personally evaluated the patient. I reviewed the Resident’s or Physician Assistant’s note (as assigned above), and agree with the findings and plan except as documented in my note.  71 F with hx of respiratory failure, trached and vented, HTN, seizure do, dementia, NPH s/p drainage, PEGed, vaughan in place was sent in from NH foe evaluation of coffee ground substance coming from that trach. Pt is non verbal and bedbound. Full code. VS reviewed, pt is chronically ill appearing,NAD. Head ncat, MMM, pt is trached and vented, normal s1s2 without any murmurs, Lungs CTAB with normal work of breathing. abd +BS, s/nd/.nt, CRISTAL exam with melena as per mlp's exam, extremities wnl, Pt is obtunded at baseline. No acute skin rashes. Plan is ekg, labs, monitor, imaging and dispo accordingly.

## 2021-11-19 NOTE — DISCHARGE NOTE PROVIDER - NSDCMRMEDTOKEN_GEN_ALL_CORE_FT
Aspirin Enteric Coated 81 mg oral delayed release tablet: 1 tab(s) orally once a day  Ativan 0.5 mg oral tablet: 1 tab(s) orally every 6 hours, As Needed for agitation  Ativan 1 mg oral tablet: orally 2 times a day  bethanechol 10 mg oral tablet: 1 tab(s) orally 3 times a day  Colace 100 mg oral capsule: 1 cap(s) orally 2 times a day  Florastor 250 mg oral capsule: 1 cap(s) orally 2 times a day  gabapentin 300 mg oral capsule: 1 cap(s) orally once a day  gabapentin 400 mg oral capsule: 1 cap(s) orally once a day  heparin 5000 units/mL injectable solution: 1 milliliter(s) injectable 2 times a day  hydrALAZINE 25 mg oral tablet: 1 tab(s) orally 3 times a day  loratadine 10 mg oral tablet: 1 tab(s) orally once a day  Melatonin 5 mg oral tablet: 1 tab(s) orally once a day (at bedtime)  Multiple Vitamins with Minerals oral tablet: 1 tab(s) orally once a day  omeprazole 40 mg oral delayed release capsule: 1 cap(s) orally once a day  Pepcid 20 mg oral tablet: 0.5 tab(s) orally once a day  Retacrit 40,000 units/mL preservative-free injectable solution: 1 milliliter(s) injectable once a week  risperiDONE 1 mg oral tablet: 1 tab(s) orally 2 times a day  Senna 8.6 mg oral tablet: 1 tab(s) orally once a day (at bedtime)  tiZANidine 4 mg oral capsule: 1 cap(s) orally 2 times a day  Tylenol 325 mg oral capsule: 1 cap(s) orally 3 times a day  Ventolin HFA 90 mcg/inh inhalation aerosol: 2 puff(s) inhaled every 6 hours   Aspirin Enteric Coated 81 mg oral delayed release tablet: 1 tab(s) orally once a day  Ativan 0.5 mg oral tablet: 1 tab(s) orally every 6 hours, As Needed for agitation  Ativan 1 mg oral tablet: orally 2 times a day  bethanechol 10 mg oral tablet: 1 tab(s) orally 3 times a day  cefpodoxime 100 mg oral tablet: 1 tab(s) orally 2 times a day   Colace 100 mg oral capsule: 1 cap(s) orally 2 times a day  Florastor 250 mg oral capsule: 1 cap(s) orally 2 times a day  gabapentin 300 mg oral capsule: 1 cap(s) orally once a day  gabapentin 400 mg oral capsule: 1 cap(s) orally once a day  heparin 5000 units/mL injectable solution: 1 milliliter(s) injectable 2 times a day  hydrALAZINE 25 mg oral tablet: 1 tab(s) orally 3 times a day  loratadine 10 mg oral tablet: 1 tab(s) orally once a day  Melatonin 5 mg oral tablet: 1 tab(s) orally once a day (at bedtime)  Multiple Vitamins with Minerals oral tablet: 1 tab(s) orally once a day  omeprazole 40 mg oral delayed release capsule: 1 cap(s) orally once a day  Pepcid 20 mg oral tablet: 0.5 tab(s) orally once a day  Retacrit 40,000 units/mL preservative-free injectable solution: 1 milliliter(s) injectable once a week  risperiDONE 1 mg oral tablet: 1 tab(s) orally 2 times a day  Senna 8.6 mg oral tablet: 1 tab(s) orally once a day (at bedtime)  tiZANidine 4 mg oral capsule: 1 cap(s) orally 2 times a day  Tylenol 325 mg oral capsule: 1 cap(s) orally 3 times a day  Ventolin HFA 90 mcg/inh inhalation aerosol: 2 puff(s) inhaled every 6 hours

## 2021-11-20 LAB
ALBUMIN SERPL ELPH-MCNC: 3.1 G/DL — LOW (ref 3.5–5.2)
ALP SERPL-CCNC: 91 U/L — SIGNIFICANT CHANGE UP (ref 30–115)
ALT FLD-CCNC: 15 U/L — SIGNIFICANT CHANGE UP (ref 0–41)
ANION GAP SERPL CALC-SCNC: 14 MMOL/L — SIGNIFICANT CHANGE UP (ref 7–14)
AST SERPL-CCNC: 14 U/L — SIGNIFICANT CHANGE UP (ref 0–41)
BASOPHILS # BLD AUTO: 0.02 K/UL — SIGNIFICANT CHANGE UP (ref 0–0.2)
BASOPHILS NFR BLD AUTO: 0.3 % — SIGNIFICANT CHANGE UP (ref 0–1)
BILIRUB SERPL-MCNC: 0.3 MG/DL — SIGNIFICANT CHANGE UP (ref 0.2–1.2)
BUN SERPL-MCNC: 35 MG/DL — HIGH (ref 10–20)
CALCIUM SERPL-MCNC: 8.7 MG/DL — SIGNIFICANT CHANGE UP (ref 8.5–10.1)
CHLORIDE SERPL-SCNC: 107 MMOL/L — SIGNIFICANT CHANGE UP (ref 98–110)
CO2 SERPL-SCNC: 19 MMOL/L — SIGNIFICANT CHANGE UP (ref 17–32)
COVID-19 NUCLEOCAPSID GAM AB INTERP: NEGATIVE — SIGNIFICANT CHANGE UP
COVID-19 NUCLEOCAPSID TOTAL GAM ANTIBODY RESULT: 0.08 INDEX — SIGNIFICANT CHANGE UP
COVID-19 SPIKE DOMAIN AB INTERP: POSITIVE
COVID-19 SPIKE DOMAIN ANTIBODY RESULT: 35.5 U/ML — HIGH
CREAT SERPL-MCNC: 1.1 MG/DL — SIGNIFICANT CHANGE UP (ref 0.7–1.5)
EOSINOPHIL # BLD AUTO: 0.14 K/UL — SIGNIFICANT CHANGE UP (ref 0–0.7)
EOSINOPHIL NFR BLD AUTO: 2.3 % — SIGNIFICANT CHANGE UP (ref 0–8)
GLUCOSE BLDC GLUCOMTR-MCNC: 100 MG/DL — HIGH (ref 70–99)
GLUCOSE BLDC GLUCOMTR-MCNC: 91 MG/DL — SIGNIFICANT CHANGE UP (ref 70–99)
GLUCOSE BLDC GLUCOMTR-MCNC: 98 MG/DL — SIGNIFICANT CHANGE UP (ref 70–99)
GLUCOSE BLDC GLUCOMTR-MCNC: 99 MG/DL — SIGNIFICANT CHANGE UP (ref 70–99)
GLUCOSE SERPL-MCNC: 198 MG/DL — HIGH (ref 70–99)
HCT VFR BLD CALC: 35.6 % — LOW (ref 37–47)
HGB BLD-MCNC: 11.2 G/DL — LOW (ref 12–16)
IMM GRANULOCYTES NFR BLD AUTO: 0.5 % — HIGH (ref 0.1–0.3)
LYMPHOCYTES # BLD AUTO: 1.08 K/UL — LOW (ref 1.2–3.4)
LYMPHOCYTES # BLD AUTO: 18.1 % — LOW (ref 20.5–51.1)
MAGNESIUM SERPL-MCNC: 2 MG/DL — SIGNIFICANT CHANGE UP (ref 1.8–2.4)
MCHC RBC-ENTMCNC: 29.6 PG — SIGNIFICANT CHANGE UP (ref 27–31)
MCHC RBC-ENTMCNC: 31.5 G/DL — LOW (ref 32–37)
MCV RBC AUTO: 94.2 FL — SIGNIFICANT CHANGE UP (ref 81–99)
MONOCYTES # BLD AUTO: 0.63 K/UL — HIGH (ref 0.1–0.6)
MONOCYTES NFR BLD AUTO: 10.6 % — HIGH (ref 1.7–9.3)
NEUTROPHILS # BLD AUTO: 4.06 K/UL — SIGNIFICANT CHANGE UP (ref 1.4–6.5)
NEUTROPHILS NFR BLD AUTO: 68.2 % — SIGNIFICANT CHANGE UP (ref 42.2–75.2)
NRBC # BLD: 0 /100 WBCS — SIGNIFICANT CHANGE UP (ref 0–0)
PHOSPHATE SERPL-MCNC: 3.1 MG/DL — SIGNIFICANT CHANGE UP (ref 2.1–4.9)
PLATELET # BLD AUTO: 264 K/UL — SIGNIFICANT CHANGE UP (ref 130–400)
POTASSIUM SERPL-MCNC: 3.7 MMOL/L — SIGNIFICANT CHANGE UP (ref 3.5–5)
POTASSIUM SERPL-SCNC: 3.7 MMOL/L — SIGNIFICANT CHANGE UP (ref 3.5–5)
PROT SERPL-MCNC: 6 G/DL — SIGNIFICANT CHANGE UP (ref 6–8)
RBC # BLD: 3.78 M/UL — LOW (ref 4.2–5.4)
RBC # FLD: 17.9 % — HIGH (ref 11.5–14.5)
SARS-COV-2 IGG+IGM SERPL QL IA: 0.08 INDEX — SIGNIFICANT CHANGE UP
SARS-COV-2 IGG+IGM SERPL QL IA: 35.5 U/ML — HIGH
SARS-COV-2 IGG+IGM SERPL QL IA: NEGATIVE — SIGNIFICANT CHANGE UP
SARS-COV-2 IGG+IGM SERPL QL IA: POSITIVE
SARS-COV-2 RNA SPEC QL NAA+PROBE: SIGNIFICANT CHANGE UP
SODIUM SERPL-SCNC: 140 MMOL/L — SIGNIFICANT CHANGE UP (ref 135–146)
WBC # BLD: 5.96 K/UL — SIGNIFICANT CHANGE UP (ref 4.8–10.8)
WBC # FLD AUTO: 5.96 K/UL — SIGNIFICANT CHANGE UP (ref 4.8–10.8)

## 2021-11-20 PROCEDURE — 99233 SBSQ HOSP IP/OBS HIGH 50: CPT

## 2021-11-20 PROCEDURE — 99222 1ST HOSP IP/OBS MODERATE 55: CPT

## 2021-11-20 RX ORDER — PANTOPRAZOLE SODIUM 20 MG/1
40 TABLET, DELAYED RELEASE ORAL
Refills: 0 | Status: DISCONTINUED | OUTPATIENT
Start: 2021-11-20 | End: 2021-11-23

## 2021-11-20 RX ORDER — ENOXAPARIN SODIUM 100 MG/ML
40 INJECTION SUBCUTANEOUS AT BEDTIME
Refills: 0 | Status: DISCONTINUED | OUTPATIENT
Start: 2021-11-20 | End: 2021-11-23

## 2021-11-20 RX ORDER — SODIUM CHLORIDE 9 MG/ML
1000 INJECTION INTRAMUSCULAR; INTRAVENOUS; SUBCUTANEOUS ONCE
Refills: 0 | Status: COMPLETED | OUTPATIENT
Start: 2021-11-20 | End: 2021-11-20

## 2021-11-20 RX ADMIN — Medication 250 MILLIGRAM(S): at 18:27

## 2021-11-20 RX ADMIN — Medication 10 MILLIGRAM(S): at 06:13

## 2021-11-20 RX ADMIN — Medication 1 MILLIGRAM(S): at 18:25

## 2021-11-20 RX ADMIN — ENOXAPARIN SODIUM 40 MILLIGRAM(S): 100 INJECTION SUBCUTANEOUS at 21:24

## 2021-11-20 RX ADMIN — Medication 10 MILLIGRAM(S): at 21:37

## 2021-11-20 RX ADMIN — LORATADINE 10 MILLIGRAM(S): 10 TABLET ORAL at 12:34

## 2021-11-20 RX ADMIN — Medication 0.5 MILLIGRAM(S): at 13:06

## 2021-11-20 RX ADMIN — Medication 250 MILLIGRAM(S): at 06:13

## 2021-11-20 RX ADMIN — PANTOPRAZOLE SODIUM 40 MILLIGRAM(S): 20 TABLET, DELAYED RELEASE ORAL at 18:26

## 2021-11-20 RX ADMIN — GABAPENTIN 300 MILLIGRAM(S): 400 CAPSULE ORAL at 09:25

## 2021-11-20 RX ADMIN — SENNA PLUS 2 TABLET(S): 8.6 TABLET ORAL at 21:23

## 2021-11-20 RX ADMIN — SODIUM CHLORIDE 1000 MILLILITER(S): 9 INJECTION INTRAMUSCULAR; INTRAVENOUS; SUBCUTANEOUS at 04:57

## 2021-11-20 RX ADMIN — GABAPENTIN 400 MILLIGRAM(S): 400 CAPSULE ORAL at 21:23

## 2021-11-20 RX ADMIN — Medication 5 MILLIGRAM(S): at 21:23

## 2021-11-20 RX ADMIN — RISPERIDONE 1 MILLIGRAM(S): 4 TABLET ORAL at 18:26

## 2021-11-20 RX ADMIN — Medication 10 MILLIGRAM(S): at 15:46

## 2021-11-20 NOTE — CHART NOTE - NSCHARTNOTEFT_GEN_A_CORE
at 4:30 am patient BP 77/53 manual (80/59 machine) w HR 58    1L NS bollus given, Blood cultures and CBC drawn and sent stat, held AM hydralazine   at 5:30 BP 89/52 w HR 61    patient looks decerebrate...

## 2021-11-20 NOTE — PROGRESS NOTE ADULT - ASSESSMENT
70F with PMH of Alzheimer's dementia, HTN, seizure disorder, NPH s/p drainage, s/p PEG/trach, recurrent UTIs presents from nursing home after she was found to have coffee ground emesis suctioned from trach over past 2 days.    #Suspected bleeding vs coffee-ground emesis from trach: resolved   - Hgb and vitals stable, afebrile, CXR clear  - evaluated by GI and surgery, no intervention as there is no evidence of bleed   - active type and screen, monitor CBC    # Reported episode of hypotension: stable   - 1L NS bollus given, Blood cultures and CBC drawn and sent stat, held AM hydralazine     #Chronic respiratory failure s/p trach  - On MV    #Agitation  - Cont Ativan 1 mg BID, 0.5 mg q6 PRN    DVT Prophylaxis: Sequentials  GI Prophylaxis: Protonix  Diet: tube feeds   Activity: Bedrest  Code Status: DNR  dispo: possible d/c back to SNF in the AM  Disposition: inpatient

## 2021-11-20 NOTE — CONSULT NOTE ADULT - SUBJECTIVE AND OBJECTIVE BOX
Patient is a 71y old  Female who presents with a chief complaint of coffee-ground emesis (20 Nov 2021 04:50)      70F with PMH of Alzheimer's dementia, HTN, seizure disorder, NPH s/p drainage, s/p PEG/trach, recurrent UTIs brought from nursing home after they suctioned material that they described as "coffee ground emesis" from the trach over the past 2 days. Unable to obtain history from the patient due to mental status, unable to reach nursing home staff at this time for collateral history. Per ED note, deny fever or vomiting.    in the ED, pt's VS: T(C): 37.1 (11-19-21 @ 02:24), Max: 37.1 (11-19-21 @ 02:24)  HR: 89 (11-19-21 @ 02:24) (89 - 108)  BP: 119/74 (11-19-21 @ 02:24) (119/74 - 152/100)  RR: 23 (11-19-21 @ 02:24) (20 - 23)  SpO2: 100% (11-19-21 @ 02:24) (97% - 100%)     In the ED, patient was vitally stable. PEG lavaged and aspirated, clear fluid came back. Trach suctioned with no return seen. admitted to vent unit      PAST MEDICAL & SURGICAL HISTORY:  Alzheimer disease    HTN (hypertension)    Tracheostomy present    PEG (percutaneous endoscopic gastrostomy) status        SOCIAL HX:   Smoking  uto    FAMILY HISTORY:      REVIEW OF SYSTEMS uto    Allergies    Cipro (Unknown)  Diflucan (Unknown)    Intolerances        ALBUTerol    90 MICROgram(s) HFA Inhaler 2 Puff(s) Inhalation every 6 hours PRN  bethanechol 10 milliGRAM(s) Oral three times a day  gabapentin 300 milliGRAM(s) Oral <User Schedule>  gabapentin 400 milliGRAM(s) Oral at bedtime  hydrALAZINE 25 milliGRAM(s) Oral three times a day  influenza  Vaccine (HIGH DOSE) 0.7 milliLiter(s) IntraMuscular once  loratadine 10 milliGRAM(s) Oral daily  LORazepam     Tablet 0.5 milliGRAM(s) Oral every 6 hours PRN  LORazepam     Tablet 1 milliGRAM(s) Oral two times a day  melatonin 5 milliGRAM(s) Oral at bedtime  pantoprazole  Injectable 40 milliGRAM(s) IV Push two times a day  risperiDONE   Tablet 1 milliGRAM(s) Oral two times a day  saccharomyces boulardii 250 milliGRAM(s) Oral two times a day  senna 2 Tablet(s) Oral at bedtime  : Home Meds:      PHYSICAL EXAM    ICU Vital Signs Last 24 Hrs  T(C): 36.3 (20 Nov 2021 04:29), Max: 37.2 (19 Nov 2021 07:57)  T(F): 97.3 (20 Nov 2021 04:29), Max: 99 (19 Nov 2021 07:57)  HR: 58 (20 Nov 2021 04:29) (58 - 91)  BP: 86/56 (20 Nov 2021 04:29) (86/56 - 122/77)  BP(mean): 67 (20 Nov 2021 04:29) (67 - 73)  RR: 20 (20 Nov 2021 04:29) (20 - 22)  SpO2: 98% (19 Nov 2021 20:10) (98% - 99%)      General: ill looking  HEENT:  tach          Lungs: Bilateral rhonchi  Cardiovascular: LAMONT 2/6  Abdomen: Soft, Positive BS  Extremities: No clubbing        LABS:                          11.9   8.18  )-----------( 291      ( 19 Nov 2021 17:00 )             37.8                                               11-18    134<L>  |  101  |  24<H>  ----------------------------<  122<H>  TNP   |  20  |  0.8    Ca    9.2      18 Nov 2021 21:40    TPro  7.9  /  Alb  3.7  /  TBili  0.3  /  DBili  0.2  /  AST  119<H>  /  ALT  29  /  AlkPhos  91  11-18      PT/INR - ( 18 Nov 2021 21:40 )   PT: 12.30 sec;   INR: 1.07 ratio         PTT - ( 18 Nov 2021 21:40 )  PTT:24.0 sec                                                                                     LIVER FUNCTIONS - ( 18 Nov 2021 21:40 )  Alb: 3.7 g/dL / Pro: 7.9 g/dL / ALK PHOS: 91 U/L / ALT: 29 U/L / AST: 119 U/L / GGT: x                                                                                               Mode: AC/ CMV (Assist Control/ Continuous Mandatory Ventilation)  RR (machine): 14  TV (machine): 400  FiO2: 40  PEEP: 5  ITime: 1  MAP: 9  PIP: 17                                          X-Rays    REVIEWED    MEDICATIONS  (STANDING):  bethanechol 10 milliGRAM(s) Oral three times a day  gabapentin 300 milliGRAM(s) Oral <User Schedule>  gabapentin 400 milliGRAM(s) Oral at bedtime  hydrALAZINE 25 milliGRAM(s) Oral three times a day  influenza  Vaccine (HIGH DOSE) 0.7 milliLiter(s) IntraMuscular once  loratadine 10 milliGRAM(s) Oral daily  LORazepam     Tablet 1 milliGRAM(s) Oral two times a day  melatonin 5 milliGRAM(s) Oral at bedtime  pantoprazole  Injectable 40 milliGRAM(s) IV Push two times a day  risperiDONE   Tablet 1 milliGRAM(s) Oral two times a day  saccharomyces boulardii 250 milliGRAM(s) Oral two times a day  senna 2 Tablet(s) Oral at bedtime    MEDICATIONS  (PRN):  ALBUTerol    90 MICROgram(s) HFA Inhaler 2 Puff(s) Inhalation every 6 hours PRN Shortness of Breath and/or Wheezing  LORazepam     Tablet 0.5 milliGRAM(s) Oral every 6 hours PRN Agitation

## 2021-11-20 NOTE — CONSULT NOTE ADULT - ASSESSMENT
IMPRESSION:    ? GI Bleed  History of advanced dementia  PEG/Trach vent dependant      PLAN:    CNS: Avoid sedation    HEENT: Oral care    PULMONARY:  HOB @ 45 degrees. Aspiration precautions,  keep Sao2 92 to 96%, not weanable    CARDIOVASCULAR: IVF    GI: GI prophylaxis.  GI fup,, protonix    RENAL:  Follow up lytes.  Correct as needed,    INFECTIOUS DISEASE: abx per ID    HEMATOLOGICAL:  DVT prophylaxis     ENDOCRINE:  Follow up FS.  Insulin protocol if needed.    MUSCULOSKELETAL: Bedrest  poor prognosis

## 2021-11-20 NOTE — PROGRESS NOTE ADULT - ASSESSMENT
71y  with PMH of Alzheimer's dementia, HTN, seizure disorder, NPH s/p drainage, s/p PEG/trach, with concern for bloody drainage from Tracheostomy cannula.    -No apparent sign of bleeding currently  -Re-check BP, strict I&O's  -Patient set to go to [place]  -Hgb stable  -No acute surgical intervention    Spectra: [id number]

## 2021-11-21 LAB
ALBUMIN SERPL ELPH-MCNC: 3.4 G/DL — LOW (ref 3.5–5.2)
ALP SERPL-CCNC: 94 U/L — SIGNIFICANT CHANGE UP (ref 30–115)
ALT FLD-CCNC: 14 U/L — SIGNIFICANT CHANGE UP (ref 0–41)
ANION GAP SERPL CALC-SCNC: 16 MMOL/L — HIGH (ref 7–14)
AST SERPL-CCNC: 16 U/L — SIGNIFICANT CHANGE UP (ref 0–41)
BASOPHILS # BLD AUTO: 0.02 K/UL — SIGNIFICANT CHANGE UP (ref 0–0.2)
BASOPHILS NFR BLD AUTO: 0.3 % — SIGNIFICANT CHANGE UP (ref 0–1)
BILIRUB SERPL-MCNC: 0.5 MG/DL — SIGNIFICANT CHANGE UP (ref 0.2–1.2)
BUN SERPL-MCNC: 29 MG/DL — HIGH (ref 10–20)
CALCIUM SERPL-MCNC: 8.9 MG/DL — SIGNIFICANT CHANGE UP (ref 8.5–10.1)
CHLORIDE SERPL-SCNC: 105 MMOL/L — SIGNIFICANT CHANGE UP (ref 98–110)
CO2 SERPL-SCNC: 20 MMOL/L — SIGNIFICANT CHANGE UP (ref 17–32)
CREAT SERPL-MCNC: 1 MG/DL — SIGNIFICANT CHANGE UP (ref 0.7–1.5)
EOSINOPHIL # BLD AUTO: 0.14 K/UL — SIGNIFICANT CHANGE UP (ref 0–0.7)
EOSINOPHIL NFR BLD AUTO: 2.1 % — SIGNIFICANT CHANGE UP (ref 0–8)
GLUCOSE BLDC GLUCOMTR-MCNC: 106 MG/DL — HIGH (ref 70–99)
GLUCOSE BLDC GLUCOMTR-MCNC: 107 MG/DL — HIGH (ref 70–99)
GLUCOSE BLDC GLUCOMTR-MCNC: 129 MG/DL — HIGH (ref 70–99)
GLUCOSE SERPL-MCNC: 124 MG/DL — HIGH (ref 70–99)
HCT VFR BLD CALC: 35 % — LOW (ref 37–47)
HGB BLD-MCNC: 11.2 G/DL — LOW (ref 12–16)
IMM GRANULOCYTES NFR BLD AUTO: 0.4 % — HIGH (ref 0.1–0.3)
LYMPHOCYTES # BLD AUTO: 1.04 K/UL — LOW (ref 1.2–3.4)
LYMPHOCYTES # BLD AUTO: 15.4 % — LOW (ref 20.5–51.1)
MAGNESIUM SERPL-MCNC: 2.2 MG/DL — SIGNIFICANT CHANGE UP (ref 1.8–2.4)
MCHC RBC-ENTMCNC: 29.6 PG — SIGNIFICANT CHANGE UP (ref 27–31)
MCHC RBC-ENTMCNC: 32 G/DL — SIGNIFICANT CHANGE UP (ref 32–37)
MCV RBC AUTO: 92.6 FL — SIGNIFICANT CHANGE UP (ref 81–99)
MONOCYTES # BLD AUTO: 0.87 K/UL — HIGH (ref 0.1–0.6)
MONOCYTES NFR BLD AUTO: 12.9 % — HIGH (ref 1.7–9.3)
NEUTROPHILS # BLD AUTO: 4.65 K/UL — SIGNIFICANT CHANGE UP (ref 1.4–6.5)
NEUTROPHILS NFR BLD AUTO: 68.9 % — SIGNIFICANT CHANGE UP (ref 42.2–75.2)
NRBC # BLD: 0 /100 WBCS — SIGNIFICANT CHANGE UP (ref 0–0)
PLATELET # BLD AUTO: 288 K/UL — SIGNIFICANT CHANGE UP (ref 130–400)
POTASSIUM SERPL-MCNC: 4 MMOL/L — SIGNIFICANT CHANGE UP (ref 3.5–5)
POTASSIUM SERPL-SCNC: 4 MMOL/L — SIGNIFICANT CHANGE UP (ref 3.5–5)
PROCALCITONIN SERPL-MCNC: 0.05 NG/ML — SIGNIFICANT CHANGE UP (ref 0.02–0.1)
PROT SERPL-MCNC: 6.5 G/DL — SIGNIFICANT CHANGE UP (ref 6–8)
RBC # BLD: 3.78 M/UL — LOW (ref 4.2–5.4)
RBC # FLD: 17.7 % — HIGH (ref 11.5–14.5)
SODIUM SERPL-SCNC: 141 MMOL/L — SIGNIFICANT CHANGE UP (ref 135–146)
WBC # BLD: 6.75 K/UL — SIGNIFICANT CHANGE UP (ref 4.8–10.8)
WBC # FLD AUTO: 6.75 K/UL — SIGNIFICANT CHANGE UP (ref 4.8–10.8)

## 2021-11-21 PROCEDURE — 99232 SBSQ HOSP IP/OBS MODERATE 35: CPT

## 2021-11-21 PROCEDURE — 99233 SBSQ HOSP IP/OBS HIGH 50: CPT

## 2021-11-21 RX ADMIN — Medication 5 MILLIGRAM(S): at 21:33

## 2021-11-21 RX ADMIN — GABAPENTIN 300 MILLIGRAM(S): 400 CAPSULE ORAL at 11:09

## 2021-11-21 RX ADMIN — GABAPENTIN 400 MILLIGRAM(S): 400 CAPSULE ORAL at 21:34

## 2021-11-21 RX ADMIN — Medication 1 MILLIGRAM(S): at 17:49

## 2021-11-21 RX ADMIN — Medication 1 MILLIGRAM(S): at 05:29

## 2021-11-21 RX ADMIN — Medication 10 MILLIGRAM(S): at 21:33

## 2021-11-21 RX ADMIN — Medication 10 MILLIGRAM(S): at 05:25

## 2021-11-21 RX ADMIN — ENOXAPARIN SODIUM 40 MILLIGRAM(S): 100 INJECTION SUBCUTANEOUS at 21:34

## 2021-11-21 RX ADMIN — Medication 250 MILLIGRAM(S): at 05:32

## 2021-11-21 RX ADMIN — RISPERIDONE 1 MILLIGRAM(S): 4 TABLET ORAL at 05:25

## 2021-11-21 RX ADMIN — Medication 10 MILLIGRAM(S): at 13:50

## 2021-11-21 RX ADMIN — PANTOPRAZOLE SODIUM 40 MILLIGRAM(S): 20 TABLET, DELAYED RELEASE ORAL at 17:45

## 2021-11-21 RX ADMIN — Medication 250 MILLIGRAM(S): at 17:45

## 2021-11-21 RX ADMIN — SENNA PLUS 2 TABLET(S): 8.6 TABLET ORAL at 21:33

## 2021-11-21 RX ADMIN — Medication 0.5 MILLIGRAM(S): at 09:09

## 2021-11-21 RX ADMIN — LORATADINE 10 MILLIGRAM(S): 10 TABLET ORAL at 11:08

## 2021-11-21 RX ADMIN — PANTOPRAZOLE SODIUM 40 MILLIGRAM(S): 20 TABLET, DELAYED RELEASE ORAL at 05:25

## 2021-11-21 RX ADMIN — RISPERIDONE 1 MILLIGRAM(S): 4 TABLET ORAL at 17:44

## 2021-11-21 NOTE — PROGRESS NOTE ADULT - ASSESSMENT
IMPRESSION:    ? GI Bleed ( hb stable)  History of advanced dementia  PEG/Trach vent dependant      PLAN:    CNS: Avoid sedation    HEENT: Oral care    PULMONARY:  HOB @ 45 degrees. Aspiration precautions,  keep Sao2 92 to 96%, not weanable    CARDIOVASCULAR: IVF    GI: GI prophylaxis.  GI fup,, protonixm, feeding    RENAL:  Follow up lytes.  Correct as needed,    INFECTIOUS DISEASE: abx per ID    HEMATOLOGICAL:  DVT prophylaxis     ENDOCRINE:  Follow up FS.  Insulin protocol if needed.    MUSCULOSKELETAL: Bedrest  poor prognosis  dc planning  DNR

## 2021-11-21 NOTE — DIETITIAN INITIAL EVALUATION ADULT. - ORAL INTAKE PTA/DIET HISTORY
I spoke to the patient's  name Zak via telephone [phone]. Per , the patient resides at [facility] and receives enteral nutrition support. NKFA.

## 2021-11-21 NOTE — DIETITIAN INITIAL EVALUATION ADULT. - RD TO REMAIN AVAILABLE
Intervention: 1.Enteral Nutrition    Monitor/Evaluate: Diet order, energy intake, nutrition focused physical findings, anemia profile/yes

## 2021-11-21 NOTE — DIETITIAN INITIAL EVALUATION ADULT. - OTHER INFO
Dx: 70y/o female with pmhx noted above presented with coffee ground emesis from trach (resolved). Admitted with dark stools. Currently in DNR status. Skin is intact (Faraz Score-10).

## 2021-11-21 NOTE — DIETITIAN INITIAL EVALUATION ADULT. - OTHER CALCULATIONS
Estimated Calorie Needs: [id number] x AF 1.2-1.3=1672-1811kcal/day;  Estimated Protein Needs: 77-100grams/day (1-1.3grams/kg of admit weight) -Due to age and chronic ventilation status;  Estimated Fluid Needs: 1672-1811mL/day (1mL/kcal)

## 2021-11-21 NOTE — DIETITIAN INITIAL EVALUATION ADULT. - REASON INDICATOR FOR ASSESSMENT
Decreased PO intake >3 days PTA (Of note, the patient received enteral nutrition support prior to admission)

## 2021-11-21 NOTE — PROGRESS NOTE ADULT - ASSESSMENT
70F with PMH of Alzheimer's dementia, HTN, seizure disorder, NPH s/p drainage, s/p PEG/trach, recurrent UTIs presents from nursing home after she was found to have coffee ground emesis suctioned from trach over past 2 days.    #Suspected bleeding vs coffee-ground emesis from trach: resolved   - Hgb and vitals stable, afebrile, CXR clear  - evaluated by GI and surgery, no intervention as there is no evidence of bleed   - active type and screen, monitor CBC, Hb has been stable    # Reported episode of hypotension: Resolved     #Chronic respiratory failure s/p trach- On MV    #Agitation- Cont Ativan 1 mg BID, 0.5 mg q6 PRN    DVT Prophylaxis: Sequentials  GI Prophylaxis: Protonix  Diet: tube feeds   Activity: Bedrest  Code Status: DNR  dispo: d/c back to SNF in the AM     70F with PMH of Alzheimer's dementia, HTN, seizure disorder, NPH s/p drainage, s/p PEG/trach, recurrent UTIs presents from nursing home after she was found to have coffee ground emesis suctioned from trach over past 2 days.    #Suspected bleeding vs coffee-ground emesis from trach: resolved   - Hgb and vitals stable, afebrile, CXR clear  - evaluated by GI and surgery, no intervention as there is no evidence of bleed   - active type and screen, monitor CBC, Hb has been stable    # Reported episode of hypotension: Resolved     #Chronic respiratory failure s/p trach- On MV    #Agitation- Cont Ativan 1 mg BID, 0.5 mg q6 PRN    DVT Prophylaxis: Lovenox   GI Prophylaxis: Protonix  Diet: tube feeds   Activity: Bedrest  Code Status: DNR  dispo: d/c back to SNF in the AM

## 2021-11-22 LAB
ALBUMIN SERPL ELPH-MCNC: 3.2 G/DL — LOW (ref 3.5–5.2)
ALP SERPL-CCNC: 90 U/L — SIGNIFICANT CHANGE UP (ref 30–115)
ALT FLD-CCNC: 14 U/L — SIGNIFICANT CHANGE UP (ref 0–41)
ANION GAP SERPL CALC-SCNC: 16 MMOL/L — HIGH (ref 7–14)
APPEARANCE UR: ABNORMAL
AST SERPL-CCNC: 16 U/L — SIGNIFICANT CHANGE UP (ref 0–41)
BACTERIA # UR AUTO: ABNORMAL
BASOPHILS # BLD AUTO: 0.02 K/UL — SIGNIFICANT CHANGE UP (ref 0–0.2)
BASOPHILS NFR BLD AUTO: 0.4 % — SIGNIFICANT CHANGE UP (ref 0–1)
BILIRUB SERPL-MCNC: 0.4 MG/DL — SIGNIFICANT CHANGE UP (ref 0.2–1.2)
BILIRUB UR-MCNC: NEGATIVE — SIGNIFICANT CHANGE UP
BUN SERPL-MCNC: 24 MG/DL — HIGH (ref 10–20)
CALCIUM SERPL-MCNC: 9.2 MG/DL — SIGNIFICANT CHANGE UP (ref 8.5–10.1)
CHLORIDE SERPL-SCNC: 104 MMOL/L — SIGNIFICANT CHANGE UP (ref 98–110)
CO2 SERPL-SCNC: 21 MMOL/L — SIGNIFICANT CHANGE UP (ref 17–32)
COLOR SPEC: YELLOW — SIGNIFICANT CHANGE UP
CREAT SERPL-MCNC: 0.8 MG/DL — SIGNIFICANT CHANGE UP (ref 0.7–1.5)
DIFF PNL FLD: ABNORMAL
EOSINOPHIL # BLD AUTO: 0.15 K/UL — SIGNIFICANT CHANGE UP (ref 0–0.7)
EOSINOPHIL NFR BLD AUTO: 3.2 % — SIGNIFICANT CHANGE UP (ref 0–8)
EPI CELLS # UR: 1 /HPF — SIGNIFICANT CHANGE UP (ref 0–5)
GLUCOSE BLDC GLUCOMTR-MCNC: 105 MG/DL — HIGH (ref 70–99)
GLUCOSE BLDC GLUCOMTR-MCNC: 147 MG/DL — HIGH (ref 70–99)
GLUCOSE BLDC GLUCOMTR-MCNC: 96 MG/DL — SIGNIFICANT CHANGE UP (ref 70–99)
GLUCOSE SERPL-MCNC: 95 MG/DL — SIGNIFICANT CHANGE UP (ref 70–99)
GLUCOSE UR QL: NEGATIVE — SIGNIFICANT CHANGE UP
HCT VFR BLD CALC: 33.9 % — LOW (ref 37–47)
HGB BLD-MCNC: 10.9 G/DL — LOW (ref 12–16)
HYALINE CASTS # UR AUTO: 0 /LPF — SIGNIFICANT CHANGE UP (ref 0–7)
IMM GRANULOCYTES NFR BLD AUTO: 0.4 % — HIGH (ref 0.1–0.3)
KETONES UR-MCNC: NEGATIVE — SIGNIFICANT CHANGE UP
LEUKOCYTE ESTERASE UR-ACNC: ABNORMAL
LYMPHOCYTES # BLD AUTO: 1.51 K/UL — SIGNIFICANT CHANGE UP (ref 1.2–3.4)
LYMPHOCYTES # BLD AUTO: 31.8 % — SIGNIFICANT CHANGE UP (ref 20.5–51.1)
MAGNESIUM SERPL-MCNC: 2.3 MG/DL — SIGNIFICANT CHANGE UP (ref 1.8–2.4)
MCHC RBC-ENTMCNC: 29.8 PG — SIGNIFICANT CHANGE UP (ref 27–31)
MCHC RBC-ENTMCNC: 32.2 G/DL — SIGNIFICANT CHANGE UP (ref 32–37)
MCV RBC AUTO: 92.6 FL — SIGNIFICANT CHANGE UP (ref 81–99)
MONOCYTES # BLD AUTO: 0.68 K/UL — HIGH (ref 0.1–0.6)
MONOCYTES NFR BLD AUTO: 14.3 % — HIGH (ref 1.7–9.3)
NEUTROPHILS # BLD AUTO: 2.37 K/UL — SIGNIFICANT CHANGE UP (ref 1.4–6.5)
NEUTROPHILS NFR BLD AUTO: 49.9 % — SIGNIFICANT CHANGE UP (ref 42.2–75.2)
NITRITE UR-MCNC: NEGATIVE — SIGNIFICANT CHANGE UP
NRBC # BLD: 0 /100 WBCS — SIGNIFICANT CHANGE UP (ref 0–0)
PH UR: 8 — SIGNIFICANT CHANGE UP (ref 5–8)
PLATELET # BLD AUTO: 257 K/UL — SIGNIFICANT CHANGE UP (ref 130–400)
POTASSIUM SERPL-MCNC: 3.9 MMOL/L — SIGNIFICANT CHANGE UP (ref 3.5–5)
POTASSIUM SERPL-SCNC: 3.9 MMOL/L — SIGNIFICANT CHANGE UP (ref 3.5–5)
PROT SERPL-MCNC: 6.1 G/DL — SIGNIFICANT CHANGE UP (ref 6–8)
PROT UR-MCNC: ABNORMAL
RBC # BLD: 3.66 M/UL — LOW (ref 4.2–5.4)
RBC # FLD: 17.3 % — HIGH (ref 11.5–14.5)
RBC CASTS # UR COMP ASSIST: 7 /HPF — HIGH (ref 0–4)
SARS-COV-2 RNA SPEC QL NAA+PROBE: SIGNIFICANT CHANGE UP
SODIUM SERPL-SCNC: 141 MMOL/L — SIGNIFICANT CHANGE UP (ref 135–146)
SP GR SPEC: 1.02 — SIGNIFICANT CHANGE UP (ref 1.01–1.03)
TRI-PHOS CRY UR QL COMP ASSIST: ABNORMAL
TSH SERPL-MCNC: 2.22 UIU/ML — SIGNIFICANT CHANGE UP (ref 0.27–4.2)
UROBILINOGEN FLD QL: ABNORMAL
WBC # BLD: 4.75 K/UL — LOW (ref 4.8–10.8)
WBC # FLD AUTO: 4.75 K/UL — LOW (ref 4.8–10.8)
WBC UR QL: 104 /HPF — HIGH (ref 0–5)

## 2021-11-22 PROCEDURE — 71045 X-RAY EXAM CHEST 1 VIEW: CPT | Mod: 26

## 2021-11-22 PROCEDURE — 99232 SBSQ HOSP IP/OBS MODERATE 35: CPT

## 2021-11-22 PROCEDURE — 93010 ELECTROCARDIOGRAM REPORT: CPT

## 2021-11-22 PROCEDURE — 99233 SBSQ HOSP IP/OBS HIGH 50: CPT

## 2021-11-22 RX ADMIN — GABAPENTIN 300 MILLIGRAM(S): 400 CAPSULE ORAL at 08:35

## 2021-11-22 RX ADMIN — LORATADINE 10 MILLIGRAM(S): 10 TABLET ORAL at 11:48

## 2021-11-22 RX ADMIN — RISPERIDONE 1 MILLIGRAM(S): 4 TABLET ORAL at 05:34

## 2021-11-22 RX ADMIN — Medication 250 MILLIGRAM(S): at 05:33

## 2021-11-22 RX ADMIN — Medication 1 MILLIGRAM(S): at 17:01

## 2021-11-22 RX ADMIN — Medication 250 MILLIGRAM(S): at 17:02

## 2021-11-22 RX ADMIN — Medication 10 MILLIGRAM(S): at 05:33

## 2021-11-22 RX ADMIN — ENOXAPARIN SODIUM 40 MILLIGRAM(S): 100 INJECTION SUBCUTANEOUS at 21:51

## 2021-11-22 RX ADMIN — PANTOPRAZOLE SODIUM 40 MILLIGRAM(S): 20 TABLET, DELAYED RELEASE ORAL at 05:34

## 2021-11-22 RX ADMIN — Medication 10 MILLIGRAM(S): at 21:52

## 2021-11-22 RX ADMIN — RISPERIDONE 1 MILLIGRAM(S): 4 TABLET ORAL at 17:02

## 2021-11-22 RX ADMIN — Medication 5 MILLIGRAM(S): at 21:52

## 2021-11-22 RX ADMIN — GABAPENTIN 400 MILLIGRAM(S): 400 CAPSULE ORAL at 21:50

## 2021-11-22 RX ADMIN — Medication 10 MILLIGRAM(S): at 13:26

## 2021-11-22 RX ADMIN — PANTOPRAZOLE SODIUM 40 MILLIGRAM(S): 20 TABLET, DELAYED RELEASE ORAL at 17:02

## 2021-11-22 NOTE — PROGRESS NOTE ADULT - ASSESSMENT
70 year old patient, known to have Alzheimer's dementia, HTN, seizure disorder, NPH s/p drainage, s/p PEG/trach, recurrent UTIs, presented from nursing home after she was found to have coffee ground emesis suctioned from trach over past 2 days.    #Suspected bleeding vs coffee-ground emesis from trach: resolved   - Hgb and vitals stable, afebrile, CXR clear  - evaluated by GI and surgery, no intervention as there is no evidence of bleed   - active type and screen, monitor CBC, Hb has been stable    #Hypothermia  - Patient developed hypothermia  - On warming blanket, T 95F axillary  - Fever workup follow up    # Reported episode of hypotension: Resolved     #Chronic respiratory failure s/p trach- On MV    #Agitation- Cont Ativan 1 mg BID, 0.5 mg q6 PRN    DVT Prophylaxis: Lovenox   GI Prophylaxis: Protonix  Diet: tube feeds   Activity: Bedrest  Code Status: DNR

## 2021-11-23 VITALS
RESPIRATION RATE: 14 BRPM | TEMPERATURE: 97 F | HEART RATE: 66 BPM | DIASTOLIC BLOOD PRESSURE: 70 MMHG | SYSTOLIC BLOOD PRESSURE: 118 MMHG

## 2021-11-23 LAB
ALBUMIN SERPL ELPH-MCNC: 3.2 G/DL — LOW (ref 3.5–5.2)
ALP SERPL-CCNC: 88 U/L — SIGNIFICANT CHANGE UP (ref 30–115)
ALT FLD-CCNC: 14 U/L — SIGNIFICANT CHANGE UP (ref 0–41)
ANION GAP SERPL CALC-SCNC: 15 MMOL/L — HIGH (ref 7–14)
AST SERPL-CCNC: 17 U/L — SIGNIFICANT CHANGE UP (ref 0–41)
BASOPHILS # BLD AUTO: 0.02 K/UL — SIGNIFICANT CHANGE UP (ref 0–0.2)
BASOPHILS NFR BLD AUTO: 0.4 % — SIGNIFICANT CHANGE UP (ref 0–1)
BILIRUB SERPL-MCNC: 0.3 MG/DL — SIGNIFICANT CHANGE UP (ref 0.2–1.2)
BUN SERPL-MCNC: 21 MG/DL — HIGH (ref 10–20)
CALCIUM SERPL-MCNC: 9.2 MG/DL — SIGNIFICANT CHANGE UP (ref 8.5–10.1)
CHLORIDE SERPL-SCNC: 106 MMOL/L — SIGNIFICANT CHANGE UP (ref 98–110)
CO2 SERPL-SCNC: 20 MMOL/L — SIGNIFICANT CHANGE UP (ref 17–32)
CREAT SERPL-MCNC: 0.8 MG/DL — SIGNIFICANT CHANGE UP (ref 0.7–1.5)
EOSINOPHIL # BLD AUTO: 0.13 K/UL — SIGNIFICANT CHANGE UP (ref 0–0.7)
EOSINOPHIL NFR BLD AUTO: 2.5 % — SIGNIFICANT CHANGE UP (ref 0–8)
GLUCOSE SERPL-MCNC: 169 MG/DL — HIGH (ref 70–99)
HCT VFR BLD CALC: 33.5 % — LOW (ref 37–47)
HGB BLD-MCNC: 10.9 G/DL — LOW (ref 12–16)
IMM GRANULOCYTES NFR BLD AUTO: 0.6 % — HIGH (ref 0.1–0.3)
LYMPHOCYTES # BLD AUTO: 1.25 K/UL — SIGNIFICANT CHANGE UP (ref 1.2–3.4)
LYMPHOCYTES # BLD AUTO: 23.7 % — SIGNIFICANT CHANGE UP (ref 20.5–51.1)
MAGNESIUM SERPL-MCNC: 2 MG/DL — SIGNIFICANT CHANGE UP (ref 1.8–2.4)
MCHC RBC-ENTMCNC: 30.1 PG — SIGNIFICANT CHANGE UP (ref 27–31)
MCHC RBC-ENTMCNC: 32.5 G/DL — SIGNIFICANT CHANGE UP (ref 32–37)
MCV RBC AUTO: 92.5 FL — SIGNIFICANT CHANGE UP (ref 81–99)
MONOCYTES # BLD AUTO: 0.55 K/UL — SIGNIFICANT CHANGE UP (ref 0.1–0.6)
MONOCYTES NFR BLD AUTO: 10.4 % — HIGH (ref 1.7–9.3)
NEUTROPHILS # BLD AUTO: 3.3 K/UL — SIGNIFICANT CHANGE UP (ref 1.4–6.5)
NEUTROPHILS NFR BLD AUTO: 62.4 % — SIGNIFICANT CHANGE UP (ref 42.2–75.2)
NRBC # BLD: 0 /100 WBCS — SIGNIFICANT CHANGE UP (ref 0–0)
PHOSPHATE SERPL-MCNC: 3.9 MG/DL — SIGNIFICANT CHANGE UP (ref 2.1–4.9)
PLATELET # BLD AUTO: 271 K/UL — SIGNIFICANT CHANGE UP (ref 130–400)
POTASSIUM SERPL-MCNC: 4.4 MMOL/L — SIGNIFICANT CHANGE UP (ref 3.5–5)
POTASSIUM SERPL-SCNC: 4.4 MMOL/L — SIGNIFICANT CHANGE UP (ref 3.5–5)
PROT SERPL-MCNC: 6 G/DL — SIGNIFICANT CHANGE UP (ref 6–8)
RBC # BLD: 3.62 M/UL — LOW (ref 4.2–5.4)
RBC # FLD: 17.2 % — HIGH (ref 11.5–14.5)
SODIUM SERPL-SCNC: 141 MMOL/L — SIGNIFICANT CHANGE UP (ref 135–146)
WBC # BLD: 5.28 K/UL — SIGNIFICANT CHANGE UP (ref 4.8–10.8)
WBC # FLD AUTO: 5.28 K/UL — SIGNIFICANT CHANGE UP (ref 4.8–10.8)

## 2021-11-23 PROCEDURE — 99239 HOSP IP/OBS DSCHRG MGMT >30: CPT

## 2021-11-23 PROCEDURE — 99232 SBSQ HOSP IP/OBS MODERATE 35: CPT

## 2021-11-23 RX ORDER — CEFPODOXIME PROXETIL 100 MG
1 TABLET ORAL
Qty: 10 | Refills: 0
Start: 2021-11-23 | End: 2021-11-27

## 2021-11-23 RX ADMIN — Medication 10 MILLIGRAM(S): at 05:40

## 2021-11-23 RX ADMIN — PANTOPRAZOLE SODIUM 40 MILLIGRAM(S): 20 TABLET, DELAYED RELEASE ORAL at 05:40

## 2021-11-23 RX ADMIN — Medication 1 MILLIGRAM(S): at 05:53

## 2021-11-23 RX ADMIN — LORATADINE 10 MILLIGRAM(S): 10 TABLET ORAL at 12:23

## 2021-11-23 RX ADMIN — RISPERIDONE 1 MILLIGRAM(S): 4 TABLET ORAL at 05:40

## 2021-11-23 RX ADMIN — GABAPENTIN 300 MILLIGRAM(S): 400 CAPSULE ORAL at 08:24

## 2021-11-23 RX ADMIN — Medication 10 MILLIGRAM(S): at 13:51

## 2021-11-23 NOTE — DISCHARGE NOTE NURSING/CASE MANAGEMENT/SOCIAL WORK - PATIENT PORTAL LINK FT
You can access the FollowMyHealth Patient Portal offered by [facility] by registering at the following website: [email or web address]. By joining [facility]’s FollowMyHealth portal, you will also be able to view your health information using other applications (apps) compatible with our system.

## 2021-11-23 NOTE — PROGRESS NOTE ADULT - SUBJECTIVE AND OBJECTIVE BOX
GENERAL SURGERY PROGRESS NOTE    Patient: DAWN SALOMON , 71y ([birth year])Female   MRN: [id number]  Location: [facility] Neuro [address]  Visit: 11-19-21 Inpatient  Date: 11-20-21 @ 04:51    Hospital Day #:  Post-Op Day #:    Procedure/Dx/Injuries:    Events of past 24 hours: Patient did not have any episodes of coffee-ground emesis today, but had episode of hypotension to 86/56 this AM. On vent 40/5.    PAST MEDICAL & SURGICAL HISTORY:  Alzheimer disease    HTN (hypertension)    Tracheostomy present    PEG (percutaneous endoscopic gastrostomy) status        Vitals:   T(F): 97.3 (11-20-21 @ 04:29), Max: 99.4 (11-19-21 @ 06:42)  HR: 58 (11-20-21 @ 04:29)  BP: 86/56 (11-20-21 @ 04:29)  RR: 20 (11-20-21 @ 04:29)  SpO2: 98% (11-19-21 @ 20:10)  Mode: AC/ CMV (Assist Control/ Continuous Mandatory Ventilation), RR (machine): 14, TV (machine): 400, FiO2: 40, PEEP: 5, ITime: 1, MAP: 9, PIP: 17    Diet, NPO with Tube Feed:   Tube Feeding Modality: Gastrostomy  Osmolite 1.5 Giorgio  Total Volume for 24 Hours (mL): 720  Bolus  Total Volume of Bolus (mL):  240  Total # of Feeds: 3  Tube Feed Frequency: Every 8 hours   Tube Feed Start Time: 19:00  Bolus Feed Rate (mL per Hour): 480   Bolus Feed Duration (in Hours): 0.5  Bolus Feed Instructions:  100cc free water flushes  Free Water Flush  Bolus   Total Volume per Flush (mL): 100   Frequency: Every 8 Hours      Fluids: sodium chloride 0.9% Bolus:   1000 milliLiter(s), IV Bolus, once, infuse over 60 Minute(s), Stop After 1 Doses      I & O's:      PHYSICAL EXAM:  General: Trach in place, not alert  HEENT: NCAT, SIMONE, EOMI, Trachea ML, Neck supple  Cardiac: RRR S1, S2, no Murmurs, rubs or gallops  Respiratory: CTAB, normal respiratory effort, breath sounds equal BL, no wheeze, rhonchi or crackles  Abdomen: Soft, non-distended, non-tender    MEDICATIONS  (STANDING):  bethanechol 10 milliGRAM(s) Oral three times a day  gabapentin 300 milliGRAM(s) Oral <User Schedule>  gabapentin 400 milliGRAM(s) Oral at bedtime  hydrALAZINE 25 milliGRAM(s) Oral three times a day  influenza  Vaccine (HIGH DOSE) 0.7 milliLiter(s) IntraMuscular once  loratadine 10 milliGRAM(s) Oral daily  LORazepam     Tablet 1 milliGRAM(s) Oral two times a day  melatonin 5 milliGRAM(s) Oral at bedtime  pantoprazole Infusion 8 mG/Hr (10 mL/Hr) IV Continuous <Continuous>  risperiDONE   Tablet 1 milliGRAM(s) Oral two times a day  saccharomyces boulardii 250 milliGRAM(s) Oral two times a day  senna 2 Tablet(s) Oral at bedtime  sodium chloride 0.9% Bolus 1000 milliLiter(s) IV Bolus once    MEDICATIONS  (PRN):  ALBUTerol    90 MICROgram(s) HFA Inhaler 2 Puff(s) Inhalation every 6 hours PRN Shortness of Breath and/or Wheezing  LORazepam     Tablet 0.5 milliGRAM(s) Oral every 6 hours PRN Agitation      DVT PROPHYLAXIS:   GI PROPHYLAXIS: pantoprazole Infusion 8 mG/Hr IV Continuous <Continuous>    ANTICOAGULATION:   ANTIBIOTICS:            LAB/STUDIES:  Labs:  CAPILLARY BLOOD GLUCOSE      POCT Blood Glucose.: 94 mg/dL (19 Nov 2021 18:34)                          11.9   8.18  )-----------( 291      ( 19 Nov 2021 17:00 )             37.8       Auto Neutrophil %: 56.1 % (11-19-21 @ 17:00)  Auto Immature Granulocyte %: 0.2 % (11-19-21 @ 17:00)    11-18    134<L>  |  101  |  24<H>  ----------------------------<  122<H>  TNP   |  20  |  0.8          LFTs:             7.9  | 0.3  | 119      ------------------[91      ( 18 Nov 2021 21:40 )  3.7  | 0.2  | 29          Lipase:x      Amylase:x         Blood Gas Venous - Lactate: 0.80 mmol/L (11-19-21 @ 02:50)  Lactate, Blood: 0.7 mmol/L (11-18-21 @ 21:40)      Coags:     12.30  ----< 1.07    ( 18 Nov 2021 21:40 )     24.0                            IMAGING:      ACCESS/ DEVICES:  [X] Peripheral IV  [ ] Central Venous Line	[ ] R	[ ] L	[ ] IJ	[ ] Fem	[ ] SC	Placed:   [ ] Arterial Line		[ ] R	[ ] L	[ ] Fem	[ ] Rad	[ ] Ax	Placed:   [ ] PICC:					[ ] Mediport  [ ] Urinary Catheter,  Date Placed:   [ ] Chest tube: [ ] Right, [ ] Left  [ ] YEYO/Nick Drains        
Patient is a 71y old  Female who presents with a chief complaint of coffee ground emesis (2021 21:08)      Over Night Events:  Patient seen and examined.   on vent     ROS:  See HPI    PHYSICAL EXAM    ICU Vital Signs Last 24 Hrs  T(C): 36.6 (2021 04:22), Max: 37.3 (2021 21:59)  T(F): 97.9 (2021 04:22), Max: 99.1 (2021 21:59)  HR: 61 (2021 04:22) (61 - 69)  BP: 103/59 (2021 04:22) (103/59 - 132/77)  BP(mean): 76 (2021 04:22) (76 - 76)  ABP: --  ABP(mean): --  RR: 20 (2021 04:22) (14 - 20)  SpO2: 99% (2021 03:20) (99% - 100%)      General: ill looking   HEENT:    trach             Lymph Nodes: NO cervical LN   Lungs: Bilateral BS  Cardiovascular: Regular   Abdomen: Soft, Positive BS  Extremities: No clubbing   Skin: warm   Neurological:   Musculoskeletal: move all ext     I&O's Detail    2021 07:01  -  2021 07:00  --------------------------------------------------------  IN:    Enteral Tube Flush: 400 mL    Jevity 1.2: 1520 mL  Total IN: 1920 mL    OUT:    Voided (mL): 1300 mL  Total OUT: 1300 mL    Total NET: 620 mL          LABS:                          10.9   5.28  )-----------( 271      ( 2021 06:54 )             33.5         2021 06:54    141    |  106    |  21     ----------------------------<  169    4.4     |  20     |  0.8      Ca    9.2        2021 06:54  Phos  3.9       2021 06:54  Mg     2.0       2021 06:54    TPro  6.0    /  Alb  3.2    /  TBili  0.3    /  DBili  x      /  AST  17     /  ALT  14     /  AlkPhos  88     2021 06:54  Amylase x     lipase x                                                                                        Urinalysis Basic - ( 2021 14:28 )    Color: Yellow / Appearance: Turbid / S.020 / pH: x  Gluc: x / Ketone: Negative  / Bili: Negative / Urobili: 6 mg/dL   Blood: x / Protein: 100 mg/dL / Nitrite: Negative   Leuk Esterase: Large / RBC: 7 /HPF /  /HPF   Sq Epi: x / Non Sq Epi: 1 /HPF / Bacteria: Many                                                                                                             Mode: AC/ CMV (Assist Control/ Continuous Mandatory Ventilation)  RR (machine): 14  TV (machine): 400  FiO2: 40  PEEP: 5  ITime: 1  MAP: 8  PIP: 17                                          MEDICATIONS  (STANDING):  bethanechol 10 milliGRAM(s) Oral three times a day  enoxaparin Injectable 40 milliGRAM(s) SubCutaneous at bedtime  gabapentin 300 milliGRAM(s) Oral <User Schedule>  gabapentin 400 milliGRAM(s) Oral at bedtime  influenza  Vaccine (HIGH DOSE) 0.7 milliLiter(s) IntraMuscular once  loratadine 10 milliGRAM(s) Oral daily  LORazepam   Injectable 1 milliGRAM(s) IV Push two times a day  melatonin 5 milliGRAM(s) Oral at bedtime  pantoprazole  Injectable 40 milliGRAM(s) IV Push two times a day  risperiDONE   Tablet 1 milliGRAM(s) Oral two times a day  saccharomyces boulardii 250 milliGRAM(s) Oral two times a day  senna 2 Tablet(s) Oral at bedtime    MEDICATIONS  (PRN):  ALBUTerol    90 MICROgram(s) HFA Inhaler 2 Puff(s) Inhalation every 6 hours PRN Shortness of Breath and/or Wheezing  LORazepam     Tablet 0.5 milliGRAM(s) Oral every 6 hours PRN Agitation          Xrays:  TLC:  OG:  ET tube:                                                                                       ECHO:  [facility] ICU:          
SUBJECTIVE:    Patient is a 71y old Female who presents with a chief complaint of coffee-ground emesis (20 Nov 2021 04:50)  Currently admitted to medicine with the primary diagnosis of Dark stools  Today is hospital day 1d. This morning she is resting comfortably in bed and reports no new issues or overnight events.     PAST MEDICAL & SURGICAL HISTORY  Alzheimer disease    HTN (hypertension)    Tracheostomy present    PEG (percutaneous endoscopic gastrostomy) status      SOCIAL HISTORY:  Negative for smoking/alcohol/drug use.     ALLERGIES:  Cipro (Unknown)  Diflucan (Unknown)    MEDICATIONS:  STANDING MEDICATIONS  bethanechol 10 milliGRAM(s) Oral three times a day  enoxaparin Injectable 40 milliGRAM(s) SubCutaneous at bedtime  gabapentin 300 milliGRAM(s) Oral <User Schedule>  gabapentin 400 milliGRAM(s) Oral at bedtime  influenza  Vaccine (HIGH DOSE) 0.7 milliLiter(s) IntraMuscular once  loratadine 10 milliGRAM(s) Oral daily  LORazepam     Tablet 1 milliGRAM(s) Oral two times a day  melatonin 5 milliGRAM(s) Oral at bedtime  pantoprazole  Injectable 40 milliGRAM(s) IV Push two times a day  risperiDONE   Tablet 1 milliGRAM(s) Oral two times a day  saccharomyces boulardii 250 milliGRAM(s) Oral two times a day  senna 2 Tablet(s) Oral at bedtime    PRN MEDICATIONS  ALBUTerol    90 MICROgram(s) HFA Inhaler 2 Puff(s) Inhalation every 6 hours PRN  LORazepam     Tablet 0.5 milliGRAM(s) Oral every 6 hours PRN    VITALS:   T(F): 98.9  HR: 87  BP: 136/91  RR: 25  SpO2: 99%    LABS:                        11.2   5.96  )-----------( 264      ( 20 Nov 2021 04:30 )             35.6     11-20    140  |  107  |  35<H>  ----------------------------<  198<H>  3.7   |  19  |  1.1    Ca    8.7      20 Nov 2021 04:30  Phos  3.1     11-20  Mg     2.0     11-20    TPro  6.0  /  Alb  3.1<L>  /  TBili  0.3  /  DBili  x   /  AST  14  /  ALT  15  /  AlkPhos  91  11-20    PT/INR - ( 18 Nov 2021 21:40 )   PT: 12.30 sec;   INR: 1.07 ratio         PTT - ( 18 Nov 2021 21:40 )  PTT:24.0 sec        PHYSICAL EXAM:  GEN: No acute distress, trached and vented   LUNGS: Clear to auscultation bilaterally   HEART: S1/S2 present. RRR.   ABD: Soft, non-tender, non-distended. Bowel sounds present. intact peg tube   EXT: No LE edema   NEURO: AAOX3    
LENGTH OF HOSPITAL STAY: 3d    CHIEF COMPLAINT:   Patient is a 71y old  Female who presents with a chief complaint of coffee ground emesis (21 Nov 2021 21:08)      HISTORY OF PRESENTING ILLNESS:    HPI:  70F with PMH of Alzheimer's dementia, HTN, seizure disorder, NPH s/p drainage, s/p PEG/trach, recurrent UTIs brought from nursing home after they suctioned material that they described as "coffee ground emesis" from the trach over the past 2 days. Unable to obtain history from the patient due to mental status, unable to reach nursing home staff at this time for collateral history. Per ED note, deny fever or vomiting.    in the ED, pt's VS: T(C): 37.1 (11-19-21 @ 02:24), Max: 37.1 (11-19-21 @ 02:24)  HR: 89 (11-19-21 @ 02:24) (89 - 108)  BP: 119/74 (11-19-21 @ 02:24) (119/74 - 152/100)  RR: 23 (11-19-21 @ 02:24) (20 - 23)  SpO2: 100% (11-19-21 @ 02:24) (97% - 100%)     In the ED, patient was vitally stable. PEG lavaged and aspirated, clear fluid came back. Trach suctioned with no return seen. (19 Nov 2021 02:52)    No overnight events.     Subjective: Patient is seen and examined at bedside.   Patient developed hypothermia. Unknown cause. Fever workup ordered.     PAST MEDICAL & SURGICAL HISTORY  PAST MEDICAL & SURGICAL HISTORY:  Alzheimer disease    HTN (hypertension)    Tracheostomy present    PEG (percutaneous endoscopic gastrostomy) status      SOCIAL HISTORY:    ALLERGIES:  Cipro (Unknown)  Diflucan (Unknown)    MEDICATIONS:  STANDING MEDICATIONS  bethanechol 10 milliGRAM(s) Oral three times a day  enoxaparin Injectable 40 milliGRAM(s) SubCutaneous at bedtime  gabapentin 300 milliGRAM(s) Oral <User Schedule>  gabapentin 400 milliGRAM(s) Oral at bedtime  influenza  Vaccine (HIGH DOSE) 0.7 milliLiter(s) IntraMuscular once  loratadine 10 milliGRAM(s) Oral daily  LORazepam   Injectable 1 milliGRAM(s) IV Push two times a day  melatonin 5 milliGRAM(s) Oral at bedtime  pantoprazole  Injectable 40 milliGRAM(s) IV Push two times a day  risperiDONE   Tablet 1 milliGRAM(s) Oral two times a day  saccharomyces boulardii 250 milliGRAM(s) Oral two times a day  senna 2 Tablet(s) Oral at bedtime    PRN MEDICATIONS  ALBUTerol    90 MICROgram(s) HFA Inhaler 2 Puff(s) Inhalation every 6 hours PRN  LORazepam     Tablet 0.5 milliGRAM(s) Oral every 6 hours PRN    VITALS:   T(F): 95.1  HR: 67  BP: 132/77  RR: 14  SpO2: 100%    LABS:                        10.9   4.75  )-----------( 257      ( 22 Nov 2021 06:30 )             33.9     11-22    141  |  104  |  24<H>  ----------------------------<  95  3.9   |  21  |  0.8    Ca    9.2      22 Nov 2021 06:30  Mg     2.3     11-22    TPro  6.1  /  Alb  3.2<L>  /  TBili  0.4  /  DBili  x   /  AST  16  /  ALT  14  /  AlkPhos  90  11-22              Culture - Blood (collected 20 Nov 2021 04:47)  Source: .Blood Blood  Preliminary Report (21 Nov 2021 17:01):    No growth to date.          RADIOLOGY:    PHYSICAL EXAM:  GEN: No acute distress  LUNGS: Clear to auscultation bilaterally   HEART: S1/S2 present. RRR.   ABD: Soft, non-tender, non-distended.  EXT: peripheral edema  NEURO: non verbal, does not follow commands    
Patient is a 71y old  Female who presents with a chief complaint of coffee ground emesis (21 Nov 2021 21:08)      Over Night Events:  Patient seen and examined.   on vent     ROS:  See HPI    PHYSICAL EXAM    ICU Vital Signs Last 24 Hrs  T(C): 34.7 (22 Nov 2021 08:31), Max: 36.9 (21 Nov 2021 13:35)  T(F): 94.4 (22 Nov 2021 08:31), Max: 98.4 (21 Nov 2021 13:35)  HR: 53 (22 Nov 2021 05:31) (53 - 89)  BP: 102/58 (22 Nov 2021 05:31) (102/58 - 169/94)  BP(mean): 75 (22 Nov 2021 05:31) (75 - 122)  ABP: --  ABP(mean): --  RR: 18 (22 Nov 2021 05:31) (17 - 18)  SpO2: 100% (22 Nov 2021 05:31) (100% - 100%)      General: ill looking   HEENT:    trach             Lymph Nodes: NO cervical LN   Lungs: Bilateral BS  Cardiovascular: Regular   Abdomen: Soft, Positive BS  Extremities: No clubbing   Skin: warm   Neurological:   Musculoskeletal: move all ext     I&O's Detail    21 Nov 2021 07:01  -  22 Nov 2021 07:00  --------------------------------------------------------  IN:    Enteral Tube Flush: 100 mL    Osmolite: 240 mL  Total IN: 340 mL    OUT:    Voided (mL): 500 mL  Total OUT: 500 mL    Total NET: -160 mL          LABS:                          10.9   4.75  )-----------( 257      ( 22 Nov 2021 06:30 )             33.9         22 Nov 2021 06:30    141    |  104    |  24     ----------------------------<  95     3.9     |  21     |  0.8      Ca    9.2        22 Nov 2021 06:30  Mg     2.3       22 Nov 2021 06:30    TPro  6.1    /  Alb  3.2    /  TBili  0.4    /  DBili  x      /  AST  16     /  ALT  14     /  AlkPhos  90     22 Nov 2021 06:30  Amylase x     lipase x                                                                                                                                                    Culture - Blood (collected 20 Nov 2021 04:47)  Source: .Blood Blood  Preliminary Report (21 Nov 2021 17:01):    No growth to date.                                                   Mode: AC/ CMV (Assist Control/ Continuous Mandatory Ventilation)  RR (machine): 14  TV (machine): 400  FiO2: 40  PEEP: 5  ITime: 1  MAP: 8  PIP: 16                                          MEDICATIONS  (STANDING):  bethanechol 10 milliGRAM(s) Oral three times a day  enoxaparin Injectable 40 milliGRAM(s) SubCutaneous at bedtime  gabapentin 300 milliGRAM(s) Oral <User Schedule>  gabapentin 400 milliGRAM(s) Oral at bedtime  influenza  Vaccine (HIGH DOSE) 0.7 milliLiter(s) IntraMuscular once  loratadine 10 milliGRAM(s) Oral daily  LORazepam   Injectable 1 milliGRAM(s) IV Push two times a day  melatonin 5 milliGRAM(s) Oral at bedtime  pantoprazole  Injectable 40 milliGRAM(s) IV Push two times a day  risperiDONE   Tablet 1 milliGRAM(s) Oral two times a day  saccharomyces boulardii 250 milliGRAM(s) Oral two times a day  senna 2 Tablet(s) Oral at bedtime    MEDICATIONS  (PRN):  ALBUTerol    90 MICROgram(s) HFA Inhaler 2 Puff(s) Inhalation every 6 hours PRN Shortness of Breath and/or Wheezing  LORazepam     Tablet 0.5 milliGRAM(s) Oral every 6 hours PRN Agitation          Xrays:  TLC:  OG:  ET tube:                                                                                       ECHO:  [facility] ICU:            
SUBJECTIVE:    Patient is a 71y old Female who presents with a chief complaint of coffee ground emesis (21 Nov 2021 06:57)  Currently admitted to medicine with the primary diagnosis of Dark stools  Today is hospital day 2d. This morning she is resting comfortably in bed and reports no new issues or overnight events.     PAST MEDICAL & SURGICAL HISTORY  Alzheimer disease    HTN (hypertension)    Tracheostomy present    PEG (percutaneous endoscopic gastrostomy) status      SOCIAL HISTORY:  Negative for smoking/alcohol/drug use.     ALLERGIES:  Cipro (Unknown)  Diflucan (Unknown)    MEDICATIONS:  STANDING MEDICATIONS  bethanechol 10 milliGRAM(s) Oral three times a day  enoxaparin Injectable 40 milliGRAM(s) SubCutaneous at bedtime  gabapentin 300 milliGRAM(s) Oral <User Schedule>  gabapentin 400 milliGRAM(s) Oral at bedtime  influenza  Vaccine (HIGH DOSE) 0.7 milliLiter(s) IntraMuscular once  loratadine 10 milliGRAM(s) Oral daily  LORazepam   Injectable 1 milliGRAM(s) IV Push two times a day  melatonin 5 milliGRAM(s) Oral at bedtime  pantoprazole  Injectable 40 milliGRAM(s) IV Push two times a day  risperiDONE   Tablet 1 milliGRAM(s) Oral two times a day  saccharomyces boulardii 250 milliGRAM(s) Oral two times a day  senna 2 Tablet(s) Oral at bedtime    PRN MEDICATIONS  ALBUTerol    90 MICROgram(s) HFA Inhaler 2 Puff(s) Inhalation every 6 hours PRN  LORazepam     Tablet 0.5 milliGRAM(s) Oral every 6 hours PRN    VITALS:   T(F): 98.4  HR: 89  BP: 169/94  RR: 17  SpO2: 100%    LABS:                        11.2   6.75  )-----------( 288      ( 21 Nov 2021 06:17 )             35.0     11-21    141  |  105  |  29<H>  ----------------------------<  124<H>  4.0   |  20  |  1.0    Ca    8.9      21 Nov 2021 06:17  Phos  3.1     11-20  Mg     2.2     11-21    TPro  6.5  /  Alb  3.4<L>  /  TBili  0.5  /  DBili  x   /  AST  16  /  ALT  14  /  AlkPhos  94  11-21          PHYSICAL EXAM:  GEN: No acute distress, trached and vented   LUNGS: Clear to auscultation bilaterally   HEART: S1/S2 present. RRR.   ABD: Soft, non-tender, non-distended. Bowel sounds present. intact peg tube   EXT: No LE edema   NEURO: AAOX3  NEURO: AAOX3    
Over Night Events: events noted, vent dependant, afebrile    PHYSICAL EXAM    ICU Vital Signs Last 24 Hrs  T(C): 36.3 (21 Nov 2021 04:51), Max: 37.2 (20 Nov 2021 13:38)  T(F): 97.3 (21 Nov 2021 04:51), Max: 98.9 (20 Nov 2021 13:38)  HR: 66 (21 Nov 2021 04:51) (63 - 88)  BP: 120/69 (21 Nov 2021 04:51) (120/69 - 136/91)  BP(mean): 89 (21 Nov 2021 04:51) (89 - 107)  RR: 22 (21 Nov 2021 04:51) (22 - 25)  SpO2: 98% (21 Nov 2021 04:51) (98% - 99%)      General: ill looking  HEENT: traach           Lungs: Bilateral BS  Cardiovascular: Regular   Abdomen: Soft, Positive BS  Extremities: No clubbing   not following commands    11-20-21 @ 07:01  -  11-21-21 @ 06:58  --------------------------------------------------------  IN:  Total IN: 0 mL    OUT:    Voided (mL): 400 mL  Total OUT: 400 mL    Total NET: -400 mL          LABS:                          11.2   5.96  )-----------( 264      ( 20 Nov 2021 04:30 )             35.6                                               11-20    140  |  107  |  35<H>  ----------------------------<  198<H>  3.7   |  19  |  1.1    Ca    8.7      20 Nov 2021 04:30  Phos  3.1     11-20  Mg     2.0     11-20    TPro  6.0  /  Alb  3.1<L>  /  TBili  0.3  /  DBili  x   /  AST  14  /  ALT  15  /  AlkPhos  91  11-20                                                                                           LIVER FUNCTIONS - ( 20 Nov 2021 04:30 )  Alb: 3.1 g/dL / Pro: 6.0 g/dL / ALK PHOS: 91 U/L / ALT: 15 U/L / AST: 14 U/L / GGT: x                                                                                               Mode: AC/ CMV (Assist Control/ Continuous Mandatory Ventilation)  RR (machine): 14  TV (machine): 400  FiO2: 40  PEEP: 5  ITime: 1  MAP: 8  PIP: 15                                          MEDICATIONS  (STANDING):  bethanechol 10 milliGRAM(s) Oral three times a day  enoxaparin Injectable 40 milliGRAM(s) SubCutaneous at bedtime  gabapentin 300 milliGRAM(s) Oral <User Schedule>  gabapentin 400 milliGRAM(s) Oral at bedtime  influenza  Vaccine (HIGH DOSE) 0.7 milliLiter(s) IntraMuscular once  loratadine 10 milliGRAM(s) Oral daily  LORazepam   Injectable 1 milliGRAM(s) IV Push two times a day  melatonin 5 milliGRAM(s) Oral at bedtime  pantoprazole  Injectable 40 milliGRAM(s) IV Push two times a day  risperiDONE   Tablet 1 milliGRAM(s) Oral two times a day  saccharomyces boulardii 250 milliGRAM(s) Oral two times a day  senna 2 Tablet(s) Oral at bedtime    MEDICATIONS  (PRN):  ALBUTerol    90 MICROgram(s) HFA Inhaler 2 Puff(s) Inhalation every 6 hours PRN Shortness of Breath and/or Wheezing  LORazepam     Tablet 0.5 milliGRAM(s) Oral every 6 hours PRN Agitation      Xrays:                                                                                     ECHO

## 2021-11-23 NOTE — PROGRESS NOTE ADULT - ASSESSMENT
IMPRESSION:    ? GI Bleed ( hb stable)  History of advanced dementia  PEG/Trach vent dependant      PLAN:  Avoid sedation  Oral care    HOB @ 45 degrees. Aspiration precautions,  keep Sao2 92 to 96%, not weanable    keep is = os   abx as per ID

## 2021-11-25 LAB
-  AMIKACIN: SIGNIFICANT CHANGE UP
-  AMOXICILLIN/CLAVULANIC ACID: SIGNIFICANT CHANGE UP
-  AMPICILLIN/SULBACTAM: SIGNIFICANT CHANGE UP
-  AMPICILLIN: SIGNIFICANT CHANGE UP
-  AZTREONAM: SIGNIFICANT CHANGE UP
-  CEFAZOLIN: SIGNIFICANT CHANGE UP
-  CEFEPIME: SIGNIFICANT CHANGE UP
-  CEFOXITIN: SIGNIFICANT CHANGE UP
-  CEFTRIAXONE: SIGNIFICANT CHANGE UP
-  CIPROFLOXACIN: SIGNIFICANT CHANGE UP
-  ERTAPENEM: SIGNIFICANT CHANGE UP
-  GENTAMICIN: SIGNIFICANT CHANGE UP
-  IMIPENEM: SIGNIFICANT CHANGE UP
-  LEVOFLOXACIN: SIGNIFICANT CHANGE UP
-  MEROPENEM: SIGNIFICANT CHANGE UP
-  NITROFURANTOIN: SIGNIFICANT CHANGE UP
-  PIPERACILLIN/TAZOBACTAM: SIGNIFICANT CHANGE UP
-  TIGECYCLINE: SIGNIFICANT CHANGE UP
-  TOBRAMYCIN: SIGNIFICANT CHANGE UP
-  TRIMETHOPRIM/SULFAMETHOXAZOLE: SIGNIFICANT CHANGE UP
CULTURE RESULTS: SIGNIFICANT CHANGE UP
METHOD TYPE: SIGNIFICANT CHANGE UP
SPECIMEN SOURCE: SIGNIFICANT CHANGE UP

## 2021-11-26 LAB
-  AMIKACIN: SIGNIFICANT CHANGE UP
-  AMOXICILLIN/CLAVULANIC ACID: SIGNIFICANT CHANGE UP
-  AMPICILLIN/SULBACTAM: SIGNIFICANT CHANGE UP
-  AMPICILLIN: SIGNIFICANT CHANGE UP
-  AZTREONAM: SIGNIFICANT CHANGE UP
-  CEFAZOLIN: SIGNIFICANT CHANGE UP
-  CEFEPIME: SIGNIFICANT CHANGE UP
-  CEFOXITIN: SIGNIFICANT CHANGE UP
-  CEFTRIAXONE: SIGNIFICANT CHANGE UP
-  CIPROFLOXACIN: SIGNIFICANT CHANGE UP
-  ERTAPENEM: SIGNIFICANT CHANGE UP
-  GENTAMICIN: SIGNIFICANT CHANGE UP
-  LEVOFLOXACIN: SIGNIFICANT CHANGE UP
-  MEROPENEM: SIGNIFICANT CHANGE UP
-  NITROFURANTOIN: SIGNIFICANT CHANGE UP
-  PIPERACILLIN/TAZOBACTAM: SIGNIFICANT CHANGE UP
-  TOBRAMYCIN: SIGNIFICANT CHANGE UP
-  TRIMETHOPRIM/SULFAMETHOXAZOLE: SIGNIFICANT CHANGE UP
CULTURE RESULTS: SIGNIFICANT CHANGE UP
METHOD TYPE: SIGNIFICANT CHANGE UP
ORGANISM # SPEC MICROSCOPIC CNT: SIGNIFICANT CHANGE UP
SPECIMEN SOURCE: SIGNIFICANT CHANGE UP

## 2021-11-28 LAB
CULTURE RESULTS: SIGNIFICANT CHANGE UP
SPECIMEN SOURCE: SIGNIFICANT CHANGE UP

## 2021-12-01 DIAGNOSIS — Z79.01 LONG TERM (CURRENT) USE OF ANTICOAGULANTS: ICD-10-CM

## 2021-12-01 DIAGNOSIS — Z99.11 DEPENDENCE ON RESPIRATOR [VENTILATOR] STATUS: ICD-10-CM

## 2021-12-01 DIAGNOSIS — J95.01 HEMORRHAGE FROM TRACHEOSTOMY STOMA: ICD-10-CM

## 2021-12-01 DIAGNOSIS — Z66 DO NOT RESUSCITATE: ICD-10-CM

## 2021-12-01 DIAGNOSIS — F02.80 DEMENTIA IN OTHER DISEASES CLASSIFIED ELSEWHERE, UNSPECIFIED SEVERITY, WITHOUT BEHAVIORAL DISTURBANCE, PSYCHOTIC DISTURBANCE, MOOD DISTURBANCE, AND ANXIETY: ICD-10-CM

## 2021-12-01 DIAGNOSIS — R19.5 OTHER FECAL ABNORMALITIES: ICD-10-CM

## 2021-12-01 DIAGNOSIS — Z88.1 ALLERGY STATUS TO OTHER ANTIBIOTIC AGENTS STATUS: ICD-10-CM

## 2021-12-01 DIAGNOSIS — R45.1 RESTLESSNESS AND AGITATION: ICD-10-CM

## 2021-12-01 DIAGNOSIS — Z93.1 GASTROSTOMY STATUS: ICD-10-CM

## 2021-12-01 DIAGNOSIS — R68.0 HYPOTHERMIA, NOT ASSOCIATED WITH LOW ENVIRONMENTAL TEMPERATURE: ICD-10-CM

## 2021-12-01 DIAGNOSIS — G40.909 EPILEPSY, UNSPECIFIED, NOT INTRACTABLE, WITHOUT STATUS EPILEPTICUS: ICD-10-CM

## 2021-12-01 DIAGNOSIS — Z79.82 LONG TERM (CURRENT) USE OF ASPIRIN: ICD-10-CM

## 2021-12-01 DIAGNOSIS — Z88.8 ALLERGY STATUS TO OTHER DRUGS, MEDICAMENTS AND BIOLOGICAL SUBSTANCES STATUS: ICD-10-CM

## 2021-12-01 DIAGNOSIS — J96.10 CHRONIC RESPIRATORY FAILURE, UNSPECIFIED WHETHER WITH HYPOXIA OR HYPERCAPNIA: ICD-10-CM

## 2021-12-01 DIAGNOSIS — G30.9 ALZHEIMER'S DISEASE, UNSPECIFIED: ICD-10-CM

## 2021-12-01 DIAGNOSIS — I10 ESSENTIAL (PRIMARY) HYPERTENSION: ICD-10-CM

## 2021-12-01 DIAGNOSIS — I95.9 HYPOTENSION, UNSPECIFIED: ICD-10-CM

## 2021-12-05 LAB
CORTICOSTEROID BINDING GLOBULIN RESULT: 2.4 MG/DL — SIGNIFICANT CHANGE UP
CORTIS F/TOTAL MFR SERPL: 8.8 % — SIGNIFICANT CHANGE UP
CORTIS SERPL-MCNC: 14 UG/DL — SIGNIFICANT CHANGE UP
CORTISOL, FREE RESULT: 1.2 UG/DL — SIGNIFICANT CHANGE UP

## 2022-05-18 NOTE — DIETITIAN INITIAL EVALUATION ADULT. - PHYSCIAL ASSESSMENT
Patient is here for Macario stress test.    CBW (4/21) 93.9 kg.     No edema noted; skin assessment on 4/20 shows blanchable redness. unable to calculate BMI or IBW without height.

## 2022-08-30 NOTE — ED PROVIDER NOTE - EKG ADDITIONAL QUESTION - PERFORMED INDEPENDENT VISUALIZATION
Pt has been dealing with depression and this morning has been especially bad this morning. Pt endorses suicidal thoughts but denies plan or intention.   Pt has had hx of this in the past. Yes

## 2023-05-06 NOTE — ED PROVIDER NOTE - MEDICAL DECISION MAKING DETAILS
Pt w/o any acute findings in ED. Attempted to reach assisted living. Plan is d/c with labs and results. Alert and oriented to person, place, time/situation. normal mood and affect. no apparent risk to self or others.

## 2023-08-31 NOTE — ED PROVIDER NOTE - NSTIMEPROVIDERCAREINITIATE_GEN_ER
Problem: Adult Inpatient Plan of Care  Goal: Plan of Care Review  Outcome: Ongoing, Progressing  Goal: Patient-Specific Goal (Individualized)  Outcome: Ongoing, Progressing  Goal: Absence of Hospital-Acquired Illness or Injury  Outcome: Ongoing, Progressing  Goal: Optimal Comfort and Wellbeing  Outcome: Ongoing, Progressing  Goal: Readiness for Transition of Care  Outcome: Ongoing, Progressing     Problem: Diabetes Comorbidity  Goal: Blood Glucose Level Within Targeted Range  Outcome: Ongoing, Progressing     Problem: Fluid Imbalance (Pneumonia)  Goal: Fluid Balance  Outcome: Ongoing, Progressing     Problem: Infection (Pneumonia)  Goal: Resolution of Infection Signs and Symptoms  Outcome: Ongoing, Progressing      19-Mar-2021 01:28

## 2023-10-12 NOTE — DIETITIAN INITIAL EVALUATION ADULT. - PERTINENT LABORATORY DATA
C/o left sided abdominal pain x 1 year. Sent by Family practice for CT scan. Pt also states she sees worms in her stool.
(4/21) RBC 2.69, H/H 8.0/24.9, Cr 0.6, glucose 158

## 2024-02-01 NOTE — DIETITIAN INITIAL EVALUATION ADULT. - [facility] [facility] FEMALE
1393.06 [Alone] : lives alone [Unemployed] : unemployed [] :  [Less Than High School] : less than high school [FreeTextEntry1] : son lives next door [FreeTextEntry4] : left school in 10th grade